# Patient Record
Sex: FEMALE | Race: ASIAN | NOT HISPANIC OR LATINO | Employment: OTHER | URBAN - METROPOLITAN AREA
[De-identification: names, ages, dates, MRNs, and addresses within clinical notes are randomized per-mention and may not be internally consistent; named-entity substitution may affect disease eponyms.]

---

## 2017-02-07 ENCOUNTER — OFFICE VISIT (OUTPATIENT)
Dept: FAMILY MEDICINE | Facility: CLINIC | Age: 62
End: 2017-02-07

## 2017-02-07 VITALS
TEMPERATURE: 98.3 F | SYSTOLIC BLOOD PRESSURE: 146 MMHG | HEART RATE: 70 BPM | DIASTOLIC BLOOD PRESSURE: 82 MMHG | BODY MASS INDEX: 25.9 KG/M2 | HEIGHT: 61 IN | WEIGHT: 137.2 LBS

## 2017-02-07 DIAGNOSIS — J31.0 CHRONIC RHINITIS: Primary | ICD-10-CM

## 2017-02-07 DIAGNOSIS — I10 BENIGN ESSENTIAL HYPERTENSION: ICD-10-CM

## 2017-02-07 RX ORDER — CETIRIZINE HYDROCHLORIDE 10 MG/1
10 TABLET ORAL EVERY EVENING
Qty: 30 TABLET | Refills: 1 | Status: SHIPPED | OUTPATIENT
Start: 2017-02-07 | End: 2017-03-13

## 2017-02-07 RX ORDER — FLUTICASONE PROPIONATE 50 MCG
1-2 SPRAY, SUSPENSION (ML) NASAL DAILY
Qty: 1 BOTTLE | Refills: 11 | Status: SHIPPED | OUTPATIENT
Start: 2017-02-07 | End: 2017-12-07

## 2017-02-07 NOTE — PROGRESS NOTES
"     SUBJECTIVE       Razia Dos Santos is a 61 year old  female with a PMHx significant for:     Patient Active Problem List   Diagnosis     Allergic rhinitis     Pure hypercholesterolemia     Insomnia     Nonspecific reaction to tuberculin skin test without active tuberculosis     Migraine     Abnormal Pap smear of cervix     Osteopenia     Glaucoma     Benign essential hypertension     Hypertension     Here today for nasal symptoms. She has more than two year history of intermittent nasal congestion, which is worse when lying down. Over the past two weeks she has started to have a \"foul odor\" in her nose, similar to cheese gone bad. She has no pain or sinus pressure. No ear pressure or ear pain. No sore throat. No cough. No fevers or sick contacts. Her symptoms do not have a seasonal component.     She takes lisinopril for her HTN. She is concerned the lisinopril may be causing hair loss.     Patient speaks Lithuanian and so an  was used.    PMH, Medications and Allergies were reviewed and updated as needed.    10 Point ROS negative except as listed in HPI        OBJECTIVE     /82 mmHg  Pulse 70  Temp(Src) 98.3  F (36.8  C) (Oral)  Ht 5' 1.02\" (155 cm)  Wt 137 lb 3.2 oz (62.234 kg)  BMI 25.90 kg/m2      Gen:  NAD  HEENT: mucous membranes moist, PERRL, TMs clear bilaterally, nares patent without significant discharge or erythema, oropharynx without petechia or exudate   Neck: supple without lymphadenopathy  CV:  RRR  - no murmurs, rubs, or gallups  Pulm:  CTAB, no wheezes/rales/rhonchi  Extrem: normal strength bilaterally  Psych: Mood and affect appropriate      No results found for this or any previous visit (from the past 24 hour(s)).      ASSESSMENT AND PLAN     Razia was seen today for nasal congestion.    Diagnoses and all orders for this visit:    Chronic rhinitis  Unclear cause, for now we will treat empirically with flonase and zyrtec.  -     fluticasone (FLONASE) 50 MCG/ACT spray; Spray 1-2 " sprays into both nostrils daily  -     cetirizine (ZYRTEC) 10 MG tablet; Take 1 tablet (10 mg) by mouth every evening    Benign essential hypertension  BP elevated today to 146/82 today. This is below the recommended 150/90 for ages greater than 60, however due to her prediabetes tighter BP control is indicated. She is reluctant to increase her dose of lisinopril (currently 5mg) because the believes it is worsening her hair loss.         -  RTC in one month to discuss increase of BP meds      RTC in 1 months for follow up of BP or sooner if develops new or worsening symptoms.    Discussed with MD Amandeep Avendaño, MS4, served as scribe for Chloe Robbins MD      The above medical student acted as a scribe and the encounter documented above was performed completely by me. Documentation recorded accurately reflects the work I have performed today.    Chloe Robbins MD - PGY-3  [facility] Residency    Patient seen and plan of care discussed with preceptor, Dr. Hayes

## 2017-02-07 NOTE — MR AVS SNAPSHOT
"              After Visit Summary   2017    Razia Dos Santos    MRN: [id number]           Patient Information     Date Of Birth          1955        Visit Information        Provider Department      2017 9:00 AM Chloe Robbins MD [facility]        Today's Diagnoses     Chronic rhinitis    -  1     Benign essential hypertension            Follow-ups after your visit        Who to contact     Please call your clinic at [phone] to:    Ask questions about your health    Make or cancel appointments    Discuss your medicines    Learn about your test results    Speak to your doctor   If you have compliments or concerns about an experience at your clinic, or if you wish to file a complaint, please contact [facility] Patient Relations at [phone] or email us at [email or web address]         Additional Information About Your Visit        MyChart Information     Bizmore is an electronic gateway that provides easy, online access to your medical records. With Bizmore, you can request a clinic appointment, read your test results, renew a prescription or communicate with your care team.     To sign up for Quippit visit the website at [email or web address]   You will be asked to enter the access code listed below, as well as some personal information. Please follow the directions to create your username and password.     Your access code is: [email or web address]  Expires: 2017  9:44 AM     Your access code will  in 90 days. If you need help or a new code, please contact your [facility] or call [phone] for assistance.        Care EveryWhere ID     This is your Care EveryWhere ID. This could be used by other organizations to access your [employer] medical records  [id number]        Your Vitals Were     Pulse Temperature Height BMI (Body Mass Index)          70 98.3  F (36.8  C) (Oral) 5' 1.02\" (155 cm) 25.90 kg/m2         Blood " Pressure from Last 3 Encounters:   02/07/17 146/82   09/15/16 142/84   03/29/16 130/83    Weight from Last 3 Encounters:   02/07/17 137 lb 3.2 oz (62.234 kg)   09/15/16 139 lb 6.4 oz (63.231 kg)   03/29/16 139 lb 3.2 oz (63.141 kg)              Today, you had the following     No orders found for display         Today's Medication Changes          These changes are accurate as of: 2/7/17  9:44 AM.  If you have any questions, ask your nurse or doctor.               Start taking these medicines.        Dose/Directions    cetirizine 10 MG tablet   Commonly known as:  zyrTEC   Used for:  Chronic rhinitis   Started by:  Chloe Robbins MD        Dose:  10 mg   Take 1 tablet (10 mg) by mouth every evening   Quantity:  30 tablet   Refills:  1       fluticasone 50 MCG/ACT spray   Commonly known as:  FLONASE   Used for:  Chronic rhinitis   Started by:  Chloe Robbins MD        Dose:  1-2 spray   Spray 1-2 sprays into both nostrils daily   Quantity:  1 Bottle   Refills:  11            Where to get your medicines      These medications were sent to [facility] - [place], [place] - [address], [place] [address]     Phone:  [phone]    - cetirizine 10 MG tablet  - fluticasone 50 MCG/ACT spray             Primary Care Provider Office Phone # Fax #    Chloe Robbins [phone]       [address] [address]        Thank you!     Thank you for choosing [facility]  for your care. Our goal is always to provide you with excellent care. Hearing back from our patients is one way we can continue to improve our services. Please take a few minutes to complete the written survey that you may receive in the mail after your visit with us. Thank you!             Your Updated Medication List - Protect others around you: Learn how to safely use, store and throw away your medicines at www.disposemymeds.org.          This list is accurate as of:  2/7/17  9:44 AM.  Always use your most recent med list.                   Brand Name Dispense Instructions for use    calcium 600-200 MG-UNIT Tabs      Take  by mouth. Take 2 tablet daily       cetirizine 10 MG tablet    zyrTEC    30 tablet    Take 1 tablet (10 mg) by mouth every evening       cholecalciferol 1000 UNIT tablet    vitamin D    100 tablet    Take 1 tablet (1,000 Units) by mouth daily       fluticasone 50 MCG/ACT spray    FLONASE    1 Bottle    Spray 1-2 sprays into both nostrils daily       ibuprofen 200 MG tablet    ADVIL/MOTRIN    120 tablet    Take 1 tablet (200 mg) by mouth every 4 hours as needed for mild pain       lisinopril 5 MG tablet    PRINIVIL/ZESTRIL    90 tablet    Take 1 tablet (5 mg) by mouth daily       MULTIVITAMINS PO      Take  by mouth. Take 1 tablet daily       naproxen 500 MG tablet    NAPROSYN    30 tablet    Take 1 tablet (500 mg) by mouth 2 times daily as needed for moderate pain       OMEGA-3 FISH OIL PO          simvastatin 20 MG tablet    ZOCOR    90 tablet    Take 1 tablet (20 mg) by mouth At Bedtime       triamcinolone 0.1 % cream    KENALOG    80 g    Apply sparingly to affected area 2 times daily as needed to the outside of the vagina.  Use no more than 2 weeks in a row.       vitamin B complex with vitamin C Tabs tablet      Take 1 tablet by mouth daily

## 2017-02-07 NOTE — PROGRESS NOTES
Preceptor attestation:  Patient seen and discussed with the resident. Assessment and plan reviewed with resident and agreed upon.  Supervising physician: Paolo Hayes  [facility]

## 2017-03-13 ENCOUNTER — OFFICE VISIT (OUTPATIENT)
Dept: FAMILY MEDICINE | Facility: CLINIC | Age: 62
End: 2017-03-13

## 2017-03-13 VITALS
HEIGHT: 61 IN | HEART RATE: 81 BPM | BODY MASS INDEX: 26.36 KG/M2 | WEIGHT: 139.6 LBS | SYSTOLIC BLOOD PRESSURE: 130 MMHG | OXYGEN SATURATION: 98 % | DIASTOLIC BLOOD PRESSURE: 85 MMHG | TEMPERATURE: 98.2 F

## 2017-03-13 DIAGNOSIS — R73.03 PRE-DIABETES: Primary | ICD-10-CM

## 2017-03-13 DIAGNOSIS — I10 BENIGN ESSENTIAL HYPERTENSION: ICD-10-CM

## 2017-03-13 DIAGNOSIS — J31.0 CHRONIC RHINITIS: ICD-10-CM

## 2017-03-13 LAB — HBA1C MFR BLD: 5.7 % (ref 4.1–5.7)

## 2017-03-13 RX ORDER — CETIRIZINE HYDROCHLORIDE 10 MG/1
10 TABLET ORAL EVERY EVENING
Qty: 90 TABLET | Refills: 3 | Status: SHIPPED | OUTPATIENT
Start: 2017-03-13 | End: 2017-12-07

## 2017-03-13 NOTE — PROGRESS NOTES
"       SUBJECTIVE       Razia Dos Santos is a 61 year old  female with a PMH significant for:     Patient Active Problem List   Diagnosis     Pure hypercholesterolemia     Insomnia     Nonspecific reaction to tuberculin skin test without active tuberculosis     Migraine     Abnormal Pap smear of cervix     Osteopenia     Glaucoma     Benign essential hypertension     Pre-diabetes     Chronic rhinitis     She presents for routine follow-up of hypertension and pre-diabetes. Reports no problems with medications. Taking Lisinopril and Simvastatin daily. No dizziness, headaches, light-headedness, muscle aches or joint pain. Denies missing doses of medication. Brought in Co-Enzyme Q supplement which she bought and is wondering if she should take this supplement. Discussed that the risks are generally low, but unsure if there is any true benefit. She would like to know for sure before she starts taking them. She is still working every day. She has been walking a little less than previously set goal of 30 minutes per day due to the weather being colder outside.    Reports that rhinitis symptoms have gotten better since last visit. Was taking cetirizine and using flonase most days but had decreased use and sometimes only using Flonase. Rich sinus pressure, rhinorrhea, ear pain, facial pain, sore throat, and fevers. Would like to know if she can decrease use of medications so she does not have to take them every day.     PMH, Medications and Allergies were reviewed and updated as needed.          OBJECTIVE     Vitals:    03/13/17 0806   BP: 130/85   Pulse: 81   Temp: 98.2  F (36.8  C)   TempSrc: Oral   SpO2: 98%   Weight: 139 lb 9.6 oz (63.3 kg)   Height: 5' 1\" (154.9 cm)     Body mass index is 26.38 kg/(m^2).    General: Calm appearing female in no apparent distress.   HEENT; normocephalic, atrauamtic, PERRL, sclera anicteric and not injected. TM flat without injection. No pain with manipulation of tragus or pinna. Oropharynx moist " without erythema or tonsillar exudates  Cardio: RRR, no murmurs, rubs or gallop  Respiratory: Clear breath sounds bilaterally, no wheezes or rhonchi  Abdomen: Soft, non-tender, no masses or organomegaly  Skin: No rashes or bruising    The 10-year ASCVD risk score (Felix TRAV Jr., et al., 2013) is: 5.5%    Values used to calculate the score:      Age: 61 years      Sex: Female      Is Non- : No      Diabetic: No      Tobacco smoker: No      Systolic Blood Pressure: 130 mmHg      Is Prescribed Antihypertensives: Yes      HDL Cholesterol: 50 mg/dL      Total Cholesterol: 179 mg/dL      ASSESSMENT AND PLAN     1. Chronic rhinitis: Symptoms significantly improved. Discussed that she can use medications as needed if she is feeling better or use them seasonally when symptoms are present. Refills are about to  on Cetirizine and so this was refilled today.   - cetirizine (ZYRTEC) 10 MG tablet; Take 1 tablet (10 mg) by mouth every evening  Dispense: 90 tablet; Refill: 3    2. Pre-diabetes: Last A1c was 5.9 in 2016. Discussed that she could recheck in 1 year or today and she prefers to check today.   - Hemoglobin A1c ([facility])    3. Benign essential hypertension: Stable and at goal with Lisinopril 5 mg and is taking a stating. ASCVD risk is 5.5%, does not require aspirin therapy. Continue current Lisinopril dose      Chloe Robbins MD - PGY-3  [facility] Residency    Patient seen and plan of care discussed with preceptor, Dr. Long

## 2017-03-13 NOTE — MR AVS SNAPSHOT
"              After Visit Summary   3/13/2017    Razia Dos Santos    MRN: [id number]           Patient Information     Date Of Birth          1955        Visit Information        Provider Department      3/13/2017 8:00 AM Chloe Robbins MD [facility]        Today's Diagnoses     Pre-diabetes    -  1    Chronic rhinitis        Benign essential hypertension           Follow-ups after your visit        Who to contact     Please call your clinic at [phone] to:    Ask questions about your health    Make or cancel appointments    Discuss your medicines    Learn about your test results    Speak to your doctor   If you have compliments or concerns about an experience at your clinic, or if you wish to file a complaint, please contact [facility] Patient Relations at [phone] or email us at [email or web address]         Additional Information About Your Visit        MyChart Information     Diveboard is an electronic gateway that provides easy, online access to your medical records. With Diveboard, you can request a clinic appointment, read your test results, renew a prescription or communicate with your care team.     To sign up for Brash Entertainmentt visit the website at [email or web address]   You will be asked to enter the access code listed below, as well as some personal information. Please follow the directions to create your username and password.     Your access code is: [email or web address]  Expires: 2017 10:44 AM     Your access code will  in 90 days. If you need help or a new code, please contact your [facility] or call [phone] for assistance.        Care EveryWhere ID     This is your Care EveryWhere ID. This could be used by other organizations to access your [place] medical records  [id number]        Your Vitals Were     Pulse Temperature Height Pulse Oximetry BMI (Body Mass Index)       81 98.2  F (36.8  C) (Oral) 5' 1\" (154.9 cm) " 98% 26.38 kg/m2        Blood Pressure from Last 3 Encounters:   03/13/17 130/85   02/07/17 146/82   09/15/16 142/84    Weight from Last 3 Encounters:   03/13/17 139 lb 9.6 oz (63.3 kg)   02/07/17 137 lb 3.2 oz (62.2 kg)   09/15/16 139 lb 6.4 oz (63.2 kg)              We Performed the Following     Hemoglobin A1c ([facility])          Where to get your medicines      These medications were sent to [facility] - [place], [place] - [address], [place] [address]     Phone:  [phone]     cetirizine 10 MG tablet          Primary Care Provider Office Phone # Fax #    Chloe Robbins [phone]       [address] [address]        Thank you!     Thank you for choosing [facility]  for your care. Our goal is always to provide you with excellent care. Hearing back from our patients is one way we can continue to improve our services. Please take a few minutes to complete the written survey that you may receive in the mail after your visit with us. Thank you!             Your Updated Medication List - Protect others around you: Learn how to safely use, store and throw away your medicines at www.disposemymeds.org.          This list is accurate as of: 3/13/17  8:57 AM.  Always use your most recent med list.                   Brand Name Dispense Instructions for use    calcium 600-200 MG-UNIT Tabs      Take  by mouth. Take 2 tablet daily       cetirizine 10 MG tablet    zyrTEC    90 tablet    Take 1 tablet (10 mg) by mouth every evening       cholecalciferol 1000 UNIT tablet    vitamin D    100 tablet    Take 1 tablet (1,000 Units) by mouth daily       co-enzyme Q-10 30 MG Caps capsule      Take by mouth daily       fluticasone 50 MCG/ACT spray    FLONASE    1 Bottle    Spray 1-2 sprays into both nostrils daily       ibuprofen 200 MG tablet    ADVIL/MOTRIN    120 tablet    Take 1 tablet (200 mg) by mouth every 4 hours as needed for mild pain        lisinopril 5 MG tablet    PRINIVIL/ZESTRIL    90 tablet    Take 1 tablet (5 mg) by mouth daily       MULTIVITAMINS PO      Take  by mouth. Take 1 tablet daily       naproxen 500 MG tablet    NAPROSYN    30 tablet    Take 1 tablet (500 mg) by mouth 2 times daily as needed for moderate pain       OMEGA-3 FISH OIL PO          simvastatin 20 MG tablet    ZOCOR    90 tablet    Take 1 tablet (20 mg) by mouth At Bedtime       triamcinolone 0.1 % cream    KENALOG    80 g    Apply sparingly to affected area 2 times daily as needed to the outside of the vagina.  Use no more than 2 weeks in a row.       vitamin B complex with vitamin C Tabs tablet      Take 1 tablet by mouth daily Reported on 3/13/2017

## 2017-03-13 NOTE — PROGRESS NOTES
"       SUBJECTIVE       Razia Dos Santos is a 61 year old  female with a PMH significant for:     Patient Active Problem List   Diagnosis     Allergic rhinitis     Pure hypercholesterolemia     Insomnia     Nonspecific reaction to tuberculin skin test without active tuberculosis     Migraine     Abnormal Pap smear of cervix     Osteopenia     Glaucoma     Benign essential hypertension     Hypertension     She presents with ***.    PMH, Medications and Allergies were reviewed and updated as needed.          OBJECTIVE     Vitals:    03/13/17 0806   BP: 130/85   Pulse: 81   Temp: 98.2  F (36.8  C)   TempSrc: Oral   SpO2: 98%   Weight: 139 lb 9.6 oz (63.3 kg)   Height: 5' 1\" (154.9 cm)     Body mass index is 26.38 kg/(m^2).    { :1039250::\"Constitutional: Awake, alert, cooperative, no apparent distress, and appears stated age.\",\"Eyes: Lids and lashes normal, pupils equal, round and reactive to light, extra ocular muscles intact, sclera clear, conjunctiva normal.\",\"ENT: Normocephalic, without obvious abnormality, atramatic, sinuses nontender on palpation, external ears without lesions, oral pharynx with moist mucus membranes, tonsils without erythema or exudates, gums normal and good dentition.\",\"Neck: Supple, symmetrical, trachea midline, no adenopathy, thyroid symmetric, not enlarged and no tenderness, skin normal.\",\"Hematologic / Lymphatic: No cervical lymphadenopathy and no supraclavicular lymphadenopathy.\",\"Back: Symmetric, no curvature, spinous processes are non-tender on palpation, paraspinous muscles are non-tender on palpation, no costal vertebral tenderness.\",\"Lungs: No increased work of breathing, good air exchange, clear to auscultation bilaterally, no crackles or wheezing.\",\"Cardiovascular: Regular rate and rhythm, normal S1 and S2, no S3 or S4, and no murmur noted.\",\"Chest / Breast: Breasts symmetrical, skin without lesion(s), no nipple retraction or dimpling, no nipple discharge, no masses palpated, no axillary or " "supraclavicular adenopathy.\",\"Abdomen: No scars, normal bowel sounds, soft, non-distended, non-tender, no masses palpated, no hepatosplenomegally.\",\"Genitourinary: No urethral discharge, normal external genitalia, no hernia.\",\"Musculoskeletal: No redness, warmth, or swelling of the joints.  Full range of motion noted.  Motor strength is 5 out of 5 all extremities bilaterally.  Tone is normal.\",\"Neurologic: Awake, alert, oriented to name, place and time.  Cranial nerves II-XII are grossly intact.  Motor is 5 out of 5 bilaterally.  Cerebellar finger to nose, heel to shin intact.  Sensory is intact.  Babinski down going, Romberg negative, and gait is normal.\",\"Neuropsychiatric: Normal affect, mood, orientation, memory and insight.\",\"Skin: No rashes, erythema, pallor, petechia or purpura.\"}    No results found for this or any previous visit (from the past 24 hour(s)).    The 10-year ASCVD risk score (Felix DC Jr., et al., 2013) is: 5.5%    Values used to calculate the score:      Age: 61 years      Sex: Female      Is Non- : No      Diabetic: No      Tobacco smoker: No      Systolic Blood Pressure: 130 mmHg      Is Prescribed Antihypertensives: Yes      HDL Cholesterol: 50 mg/dL      Total Cholesterol: 179 mg/dL        ASSESSMENT AND PLAN     There are no diagnoses linked to this encounter.      RTC in *** for follow up of *** or sooner if develops new or worsening symptoms.    Chloe Robbins    "

## 2017-03-13 NOTE — NURSING NOTE
name: Alison Mtz  Language: Moroccan  Agency: [facility]  Phone number: [phone]

## 2017-03-13 NOTE — PROGRESS NOTES
Preceptor attestation:  Patient seen and discussed with the resident. Assessment and plan reviewed with resident and agreed upon.  Supervising physician: Darin Long  [facility]

## 2017-03-23 DIAGNOSIS — I10 BENIGN ESSENTIAL HYPERTENSION: ICD-10-CM

## 2017-03-23 RX ORDER — LISINOPRIL 5 MG/1
5 TABLET ORAL DAILY
Qty: 90 TABLET | Refills: 3 | Status: SHIPPED | OUTPATIENT
Start: 2017-03-23 | End: 2017-12-26

## 2017-03-23 RX ORDER — SIMVASTATIN 20 MG
20 TABLET ORAL AT BEDTIME
Qty: 90 TABLET | Refills: 4 | Status: SHIPPED | OUTPATIENT
Start: 2017-03-23 | End: 2018-05-08

## 2017-08-12 ENCOUNTER — HEALTH MAINTENANCE LETTER (OUTPATIENT)
Age: 62
End: 2017-08-12

## 2017-11-21 DIAGNOSIS — E55.9 VITAMIN D DEFICIENCY: ICD-10-CM

## 2017-12-07 ENCOUNTER — RECORDS - HEALTHEAST (OUTPATIENT)
Dept: ADMINISTRATIVE | Facility: OTHER | Age: 62
End: 2017-12-07

## 2017-12-07 ENCOUNTER — OFFICE VISIT (OUTPATIENT)
Dept: FAMILY MEDICINE | Facility: CLINIC | Age: 62
End: 2017-12-07

## 2017-12-07 VITALS
HEIGHT: 61 IN | DIASTOLIC BLOOD PRESSURE: 81 MMHG | SYSTOLIC BLOOD PRESSURE: 146 MMHG | TEMPERATURE: 97.8 F | BODY MASS INDEX: 26.32 KG/M2 | HEART RATE: 62 BPM | WEIGHT: 139.4 LBS

## 2017-12-07 DIAGNOSIS — H02.9 LESION OF LEFT EYELID: Primary | ICD-10-CM

## 2017-12-07 DIAGNOSIS — R73.03 PREDIABETES: ICD-10-CM

## 2017-12-07 DIAGNOSIS — Z12.31 VISIT FOR SCREENING MAMMOGRAM: ICD-10-CM

## 2017-12-07 DIAGNOSIS — M85.80 OSTEOPENIA, UNSPECIFIED LOCATION: ICD-10-CM

## 2017-12-07 DIAGNOSIS — I10 BENIGN ESSENTIAL HYPERTENSION: ICD-10-CM

## 2017-12-07 DIAGNOSIS — Z23 NEED FOR VACCINATION: ICD-10-CM

## 2017-12-07 DIAGNOSIS — Z00.00 ROUTINE GENERAL MEDICAL EXAMINATION AT A HEALTH CARE FACILITY: ICD-10-CM

## 2017-12-07 LAB
BUN SERPL-MCNC: 14.8 MG/DL (ref 7–19)
CALCIUM SERPL-MCNC: 10 MG/DL (ref 8.5–10.1)
CHLORIDE SERPLBLD-SCNC: 108.2 MMOL/L (ref 98–110)
CHOLEST SERPL-MCNC: 212.3 MG/DL (ref 0–200)
CHOLEST/HDLC SERPL: 3.6 {RATIO} (ref 0–5)
CO2 SERPL-SCNC: 28.7 MMOL/L (ref 20–32)
CREAT SERPL-MCNC: 0.8 MG/DL (ref 0.5–1)
GFR SERPL CREATININE-BSD FRML MDRD: 77 ML/MIN/1.7 M2
GLUCOSE SERPL-MCNC: 104.7 MG'DL (ref 70–99)
HBA1C MFR BLD: 6 % (ref 4.1–5.7)
HDLC SERPL-MCNC: 59.5 MG/DL
LDLC SERPL CALC-MCNC: 125 MG/DL (ref 0–129)
POTASSIUM SERPL-SCNC: 4.4 MMOL/DL (ref 3.2–4.6)
SODIUM SERPL-SCNC: 144.3 MMOL/L (ref 132–142)
TRIGL SERPL-MCNC: 140.1 MG/DL (ref 0–150)
VLDL CHOLESTEROL: 28 MG/DL (ref 7–32)

## 2017-12-07 NOTE — PATIENT INSTRUCTIONS
Labs today  Schedule mammogram and DEXA (bone scan) and referral for mole removal  Flu shot  Come back in 6 months for blood pressure check  Dr. Mae Perez      Preventive Health Recommendations  Female Ages 50 - 64    Yearly exam: See your health care provider every year in order to  o Review health changes.   o Discuss preventive care.    o Review your medicines if your doctor has prescribed any.      Get a Pap test every three years (unless you have an abnormal result and your provider advises testing more often).    If you get Pap tests with HPV test, you only need to test every 5 years, unless you have an abnormal result.     You do not need a Pap test if your uterus was removed (hysterectomy) and you have not had cancer.    You should be tested each year for STDs (sexually transmitted diseases) if you're at risk.     Have a mammogram every 1 to 2 years.    Have a colonoscopy at age 50, or have a yearly FIT test (stool test). These exams screen for colon cancer.      Have a cholesterol test every 5 years, or more often if advised.    Have a diabetes test (fasting glucose) every three years. If you are at risk for diabetes, you should have this test more often.     If you are at risk for osteoporosis (brittle bone disease), think about having a bone density scan (DEXA).    Shots: Get a flu shot each year. Get a tetanus shot every 10 years.    Nutrition:     Eat at least 5 servings of fruits and vegetables each day.    Eat whole-grain bread, whole-wheat pasta and brown rice instead of white grains and rice.    Talk to your provider about Calcium and Vitamin D.     Lifestyle    Exercise at least 150 minutes a week (30 minutes a day, 5 days a week). This will help you control your weight and prevent disease.    Limit alcohol to one drink per day.    No smoking.     Wear sunscreen to prevent skin cancer.     See your dentist every six months for an exam and cleaning.    See your eye doctor every 1 to 2  years.       [facility]  [address] [place] [address]  Hours: Open today   8:30AM-5:30PM  Phone: [phone]    Appointment  Date:1/23/17  Time: 1:45pm   Fredis Azevedo MD    Bring photo ID and insurance card.           [facility]  Radiology Department  [address] [place] [address]  [phone]    Appointment for mammogram   Date:12/22/2017  Time:2:00pm    The day of your mammogram please refrain from using lotions, powders, or perfumes in the area as this could interfere with the imaging.         [facility]  [address]  [place] [address]  [phone]    Appointment for Dexa scan  Date:2/7/17  Time:8:30am arrival     Please contact the above clinic if you need to cancel or reschedule. Feel free to contact me with any questions. Thanks!    Joselyn  Referral Coordinator  [phone]          No vitamin pills, calcium supplements or antacids such as tums, rolaids etc.. 2 days prior to appointment and the day of the appointment.

## 2017-12-07 NOTE — PROGRESS NOTES
Female Physical Note    Concerns today:  Throat problems - within the last year, once every three months, when she swallows she chokes. Happens with water and solid foods, although thinks dry solids are likely worse. No problems with dry mouth, does have dry eyes. Last happened about 3 weeks ago. Happens once per day when it happens - is not a problem for the rest of the day. No pain with swallowing or otherwise trouble swallowing. No voice changes. No problems breathing.      Also notes an occasional sharp pain at the bottom of her neck. No trigger, unrelated to choking. Does not appear to be related to activity. Position doesn't matter. Lasts up to 30 seconds. Last happened about a year ago. Non-tender. Thinks it happens about 3x/year. No rash.Doesn't radiate.     When she sleeps, notes urge to have bowel movement, but unable to go despite 15 minutes of pushing. Has one bowel movement per day, soft without pushing, no blood in stool.     A [employer]  was used for  this visit.     ROS:  CONSTITUTIONAL: no fatigue, no unexpected change in weight  SKIN: no worrisome rashes, +dry skin. +mole on inner L eye, getting bigger. Eye doctor thought she should have it removed. Has been there approx 30 years, but has been getting bigger over the last year. Doesn't bother vision. Doesn't bleed or itch.   BREAST: No lumps or changes.   EYES: no acute vision problems or changes  ENT: no ear problems, no mouth problems,   RESP: no significant cough, no shortness of breath  CV: no chest pain, no palpitations, no new or worsening peripheral edema  GI: no nausea, no vomiting, no constipation, no diarrhea      Menarche:* No LMP recorded. Patient is postmenopausal.  Last Pap Smear Date: 2015 normal  Abnormal Pap History: See problem list - now on routine schedule    Patient Active Problem List   Diagnosis     Pure hypercholesterolemia     Insomnia     Nonspecific reaction to tuberculin skin test without active  tuberculosis     Migraine     Abnormal Pap smear of cervix     Osteopenia     Glaucoma     Benign essential hypertension     Pre-diabetes     Chronic rhinitis       Current Outpatient Prescriptions   Medication Sig Dispense Refill     cholecalciferol (VITAMIN D3) 1000 UNIT tablet Take 1 tablet (1,000 Units) by mouth daily 100 tablet 3     simvastatin (ZOCOR) 20 MG tablet Take 1 tablet (20 mg) by mouth At Bedtime 90 tablet 4     lisinopril (PRINIVIL/ZESTRIL) 5 MG tablet Take 1 tablet (5 mg) by mouth daily 90 tablet 3     Omega-3 Fatty Acids (OMEGA-3 FISH OIL PO)        calcium 600-200 MG-UNIT TABS Take  by mouth. Take 2 tablet daily       Multiple Vitamin (MULTIVITAMINS PO) Take  by mouth. Take 1 tablet daily       co-enzyme Q-10 30 MG CAPS capsule Take by mouth daily  0     vitamin  B complex with vitamin C (STRESS TAB) TABS Take 1 tablet by mouth daily Reported on 3/13/2017         Past Medical History:   Diagnosis Date     HPV (low risk) 3/14/2013    3/2013:  Normal pap, positive for low risk HPV.   H/O ASCUS in 2005, +HPV of undetermined risk in 2012 No history of high grade lesion     Hypertension         Family History        Negative family history of: DIABETES, Coronary Artery Disease, Hypertension, Hyperlipidemia, Breast Cancer, Cancer - colorectal, Ovarian Cancer, Prostate Cancer, Other Cancer, Mental Illness, CEREBROVASCULAR DISEASE, Anesthesia Reaction, Asthma, OSTEOPOROSIS, Known Genetic Syndrome, Obesity, Unknown/Adopted          Problem List Medication List and Allergy List were reviewed.    Patient is an established patient of this clinic..    Social History   Substance Use Topics     Smoking status: Never Smoker     Smokeless tobacco: Never Used     Alcohol use No       Children ? no    Has anyone hurt you physically, for example by pushing, hitting, slapping or kicking you or forcing you to have sex? Denies  Do you feel threatened or controlled by a partner, ex-partner or anyone in your life?  "Denies    RISK BEHAVIORS AND HEALTHY HABITS:  Tobacco Use/Smoking: None  Illicit Drug Use: None  Do you use alcohol? 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   Dental Care: Yes   Calcium 1500 mg/d:  Yes  Seat Belt Use: Yes     Cholesterol Level (>44 yo or at risk):  Recommended and patient accepted testing., Pap/HPV cotest every 5 years for women 30-65   Testing not indicated  and Dexa Scan (women>65 yrs or risk = that of a 66yo woman):  Recommended and patient accepted testing.  Breast CA Screening (>39 yo or 10 y before 1st degree relative diagnosis): Recommended and patient accepted testing.      Immunization History   Administered Date(s) Administered     HEPA 04/24/2006, 11/13/2006     HepB 04/24/2006, 11/13/2006, 12/13/2011     Influenza (IIV3) PF 12/13/2011     Influenza Vaccine, 3 YRS +, IM (QUADRIVALENT W/PRESERVATIVES) 10/15/2015     TD (ADULT, 7+) 11/09/1999     Tdap (Adacel,Boostrix) 12/13/2011     Typhoid IM 04/24/2006    Reviewed Immunization Record Today    EXAMINATION:   /81  Pulse 62  Temp 97.8  F (36.6  C) (Oral)  Ht 5' 1\" (154.9 cm)  Wt 139 lb 6.4 oz (63.2 kg)  BMI 26.34 kg/m2  GENERAL: healthy, alert and no distress  EYES: Eyes grossly normal to inspection, extraocular movements - intact, and PERRL Approx 4 mm pearly papule with telangiectasia on medial upper L eyelid.   HENT: ear canals- normal; TMs- normal; Nose- normal; Mouth- no ulcers, no lesions Swallows water without tenderness or choking  NECK: no tenderness, no adenopathy, no asymmetry, no masses, no stiffness;   RESP: lungs clear to auscultation - no rales, no rhonchi, no wheezes  BREAST: no masses, no tenderness, no nipple discharge, no palpable axillary masses or adenopathy  CV: regular rates and rhythm, normal S1 S2, no S3 or S4 and no murmur, no click or rub -  ABDOMEN: soft, no tenderness, no  hepatosplenomegaly, no masses, " normal bowel sounds  MS: extremities- no gross deformities noted, no edema  SKIN: no suspicious lesions, no rashes  NEURO: strength and tone- normal, sensory exam- grossly normal, mentation- intact, speech- normal, reflexes- symmetric  BACK: no CVA tenderness, no paralumbar tenderness  - female: Deferred  PSYCH: Alert and oriented times 3; speech- coherent , normal rate and volume; able to articulate logical thoughts, able to abstract reason, no tangential thoughts, no hallucinations or delusions, affect- normal  LYMPHATICS: ant. cervical- normal, post. cervical- normal, axillary- normal, supraclavicular- normal,    ASSESSMENT/PLAN:  1. Routine general medical examination at a health care facility  2. Visit for screening mammogram  Generally healthy 62 year old female with PMH of pre-diabetes and osteopenia. Not due for colorectal cancer screening. Not of age for pneumococcal vaccines. Discussed additional concerns including choking on water (no weight loss, odynophagia/dysphagia otherwise or globus sensation or risk factors such as betel nut or tobacco use), will defer further work up at this time due to rare intermittent nature of problem - could consider laryngoscopy in future if continues to be a problem. Chest pain intermittent but not triggered by exertion - discussed cardiac warning signs and indications to seek further treatment. Bowel movements otherwise reassuring and regular. Last mammogram 2014, no family history of breast cancer. She is on regular Pap screen schedule now despite abnormal in the past - will likely need one more normal screen before discussing cessation of screening. She will defer this until next year (last one 2015 and normal - no cotesting at that time).  - MA SCREENING DIGITAL BILAT; Future    3. Lesion of left eyelid  Growing over past year, will refer for removal  - OPHTHALMOLOGY ADULT REFERRAL    4. Prediabetes  Last A1c of 5.7, has been trying diet and exercise. On simvastatin  without side effects, will recheck lipids today  - Hemoglobin A1c ([facility])  - Lipid Panel ([place])    5. Osteopenia, unspecified location  Last DEXA shows osteopenia in 2008, will recheck as 5 years out. Continue vitamin D.  - Dexa hip/pelvis/spine*; Future    6. Benign essential hypertension  Elevated BP today, did not take BP med before coming in. Will check BMP as on lisinopril and have her come back in 6 months for further discussion.   - Basic Metabolic Panel ([place])    7. Need for vaccination  Flu shot today  - ADMIN VACCINE, INITIAL  - FLU VAC QUADRIVLENT SPLIT VIRUS IM 0.5ml dosage      Mae Perez MD, PGY-2  [facility] Medicine Residency  Precepted with Dr. Wes Fitch

## 2017-12-07 NOTE — NURSING NOTE
"Injectable Influenza Immunization Documentation    1.  Has the patient received the information for the injectable influenza vaccine? YES     2. Is the patient 6 months of age or older? YES     3. Does the patient have any of the following contraindications?         Severe allergy to eggs? No     Severe allergic reaction to previous influenza vaccines? No   Severe allergy to latex? No       History of Guillain-Headland syndrome? No     Currently have a temperature greater than 100.4F? No        4.  Severely egg allergic patients should have flu vaccine eligibility assessed by an MD, RN, or pharmacist, and those who received flu vaccine should be observed for 15 min by an MD, RN, Pharmacist, Medical Technician, or member of clinic staff.\": YES    5. Latex-allergic patients should be given latex-free influenza vaccine Yes. Please reference the Vaccine latex table to determine if your clinic s product is latex-containing.       Vaccination given by MAICO Hinton          "

## 2017-12-07 NOTE — LETTER
December 11, 2017      Razia Dos Santos  [address] [place] [id number]        Dear Razia,    Please see below for your test results.   Your labs look pretty good from our last visit. You are still in the pre-diabetes range. I think we could do more to prevent you from having diabetes in the future. If you are interested in starting a program that will help you with lifestyle changes or a possible medication, please make an appointment to see us to discuss this further. Your cholesterol looks OK, so we can continue that same medication you've been taking (simvastatin).   10 year ASCVD risk 7.1%. On moderate intensity statin. Will continue current course.     Resulted Orders   Hemoglobin A1c ([facility])   Result Value Ref Range    Hemoglobin A1C 6.0 (H) 4.1 - 5.7 %   Basic Metabolic Panel ([place])   Result Value Ref Range    Urea Nitrogen 14.8 7.0 - 19.0 mg/dL    Calcium 10.0 8.5 - 10.1 mg/dL    Chloride 108.2 98.0 - 110.0 mmol/L    Carbon Dioxide 28.7 20.0 - 32.0 mmol/L    Creatinine 0.8 0.5 - 1.0 mg/dL    Glucose 104.7 (H) 70.0 - 99.0 mg'dL    Potassium 4.4 3.2 - 4.6 mmol/dL    Sodium 144.3 (H) 132.0 - 142.0 mmol/L    GFR Estimate 77.0 >60.0 mL/min/1.7 m2    GFR Estimate If Black >90 >60.0 mL/min/1.7 m2   Lipid Panel ([place])   Result Value Ref Range    Cholesterol 212.3 (H) 0.0 - 200.0 mg/dL    Cholesterol/HDL Ratio 3.6 0.0 - 5.0    HDL Cholesterol 59.5 >40.0 mg/dL    LDL Cholesterol Calculated 125 0 - 129 mg/dL    Triglycerides 140.1 0.0 - 150.0 mg/dL    VLDL Cholesterol 28.0 7.0 - 32.0 mg/dL       If you have any questions, please call the clinic to make an appointment.    Sincerely,    Mae Perez MD

## 2017-12-07 NOTE — MR AVS SNAPSHOT
After Visit Summary   12/7/2017    Razia Dos Santos    MRN: [id number]           Patient Information     Date Of Birth          1955        Visit Information        Provider Department      12/7/2017 8:00 AM Mae Perez MD [facility]        Today's Diagnoses     Routine general medical examination at a health care facility        Visit for screening mammogram        Screening for cervical cancer          Care Instructions    Labs today  Schedule mammogram and DEXA (bone scan) and referral for mole removal  Flu shot  Come back in 6 months for blood pressure check  Dr. Mae Perez      Preventive Health Recommendations  Female Ages 50 - 64    Yearly exam: See your health care provider every year in order to  o Review health changes.   o Discuss preventive care.    o Review your medicines if your doctor has prescribed any.      Get a Pap test every three years (unless you have an abnormal result and your provider advises testing more often).    If you get Pap tests with HPV test, you only need to test every 5 years, unless you have an abnormal result.     You do not need a Pap test if your uterus was removed (hysterectomy) and you have not had cancer.    You should be tested each year for STDs (sexually transmitted diseases) if you're at risk.     Have a mammogram every 1 to 2 years.    Have a colonoscopy at age 50, or have a yearly FIT test (stool test). These exams screen for colon cancer.      Have a cholesterol test every 5 years, or more often if advised.    Have a diabetes test (fasting glucose) every three years. If you are at risk for diabetes, you should have this test more often.     If you are at risk for osteoporosis (brittle bone disease), think about having a bone density scan (DEXA).    Shots: Get a flu shot each year. Get a tetanus shot every 10 years.    Nutrition:     Eat at least 5 servings of fruits and vegetables each day.    Eat whole-grain bread, whole-wheat pasta and  brown rice instead of white grains and rice.    Talk to your provider about Calcium and Vitamin D.     Lifestyle    Exercise at least 150 minutes a week (30 minutes a day, 5 days a week). This will help you control your weight and prevent disease.    Limit alcohol to one drink per day.    No smoking.     Wear sunscreen to prevent skin cancer.     See your dentist every six months for an exam and cleaning.    See your eye doctor every 1 to 2 years.            Follow-ups after your visit        Future tests that were ordered for you today     Open Future Orders        Priority Expected Expires Ordered    MA SCREENING DIGITAL BILAT Routine  2018            Who to contact     Please call your clinic at [phone] to:    Ask questions about your health    Make or cancel appointments    Discuss your medicines    Learn about your test results    Speak to your doctor   If you have compliments or concerns about an experience at your clinic, or if you wish to file a complaint, please contact [facility] Patient Relations at [phone] or email us at [email or web address]         Additional Information About Your Visit        [employer] Information     [employer] is an electronic gateway that provides easy, online access to your medical records. With [employer], you can request a clinic appointment, read your test results, renew a prescription or communicate with your care team.     To sign up for [employer] visit the website at [email or web address]   You will be asked to enter the access code listed below, as well as some personal information. Please follow the directions to create your username and password.     Your access code is: [email or web address]  Expires: 3/7/2018  9:06 AM     Your access code will  in 90 days. If you need help or a new code, please contact your [facility] or call [phone] for assistance.        Care EveryWhere ID      "This is your Care EveryWhere ID. This could be used by other organizations to access your [place] medical records  [id number]        Your Vitals Were     Pulse Temperature Height BMI (Body Mass Index)          62 97.8  F (36.6  C) (Oral) 5' 1\" (154.9 cm) 26.34 kg/m2         Blood Pressure from Last 3 Encounters:   12/07/17 146/81   03/13/17 130/85   02/07/17 146/82    Weight from Last 3 Encounters:   12/07/17 139 lb 6.4 oz (63.2 kg)   03/13/17 139 lb 9.6 oz (63.3 kg)   02/07/17 137 lb 3.2 oz (62.2 kg)                 Today's Medication Changes          These changes are accurate as of: 12/7/17  9:06 AM.  If you have any questions, ask your nurse or doctor.               Stop taking these medicines if you haven't already. Please contact your care team if you have questions.     cetirizine 10 MG tablet   Commonly known as:  zyrTEC   Stopped by:  Mae Perez MD           fluticasone 50 MCG/ACT spray   Commonly known as:  FLONASE   Stopped by:  Mae Perez MD           ibuprofen 200 MG tablet   Commonly known as:  ADVIL/MOTRIN   Stopped by:  Mae Perez MD           naproxen 500 MG tablet   Commonly known as:  NAPROSYN   Stopped by:  Mae Perez MD           triamcinolone 0.1 % cream   Commonly known as:  KENALOG   Stopped by:  Mae Perez MD                    Primary Care Provider Office Phone # Fax #    Mae Perez [phone]       [facility] [address] [place] [address]        Equal Access to Services     DONALD ANGEL : Hadteja Queen, figueroa jiang, qaosvaldota ahmet rascon. So [facility] [phone].    ATENCIÓN: Si habla español, tiene a batista disposición servicios gratuitos de asistencia lingüística. Llame al [phone].    We comply with applicable federal civil rights laws and [place] laws. We do not discriminate on the basis of race, color, national origin, " age, disability, sex, sexual orientation, or gender identity.            Thank you!     Thank you for choosing [facility]  for your care. Our goal is always to provide you with excellent care. Hearing back from our patients is one way we can continue to improve our services. Please take a few minutes to complete the written survey that you may receive in the mail after your visit with us. Thank you!             Your Updated Medication List - Protect others around you: Learn how to safely use, store and throw away your medicines at www.disposemymeds.org.          This list is accurate as of: 12/7/17  9:06 AM.  Always use your most recent med list.                   Brand Name Dispense Instructions for use Diagnosis    calcium 600-200 MG-UNIT Tabs      Take  by mouth. Take 2 tablet daily        cholecalciferol 1000 UNIT tablet    vitamin D3    100 tablet    Take 1 tablet (1,000 Units) by mouth daily    Vitamin D deficiency       co-enzyme Q-10 30 MG Caps capsule      Take by mouth daily        lisinopril 5 MG tablet    PRINIVIL/ZESTRIL    90 tablet    Take 1 tablet (5 mg) by mouth daily    Benign essential hypertension       MULTIVITAMINS PO      Take  by mouth. Take 1 tablet daily        OMEGA-3 FISH OIL PO           simvastatin 20 MG tablet    ZOCOR    90 tablet    Take 1 tablet (20 mg) by mouth At Bedtime    Benign essential hypertension       vitamin B complex with vitamin C Tabs tablet      Take 1 tablet by mouth daily Reported on 3/13/2017

## 2017-12-07 NOTE — PROGRESS NOTES
Preceptor attestation:  Patient seen and discussed with the resident. Assessment and plan reviewed with resident and agreed upon.  Supervising physician: Wes Linda  [facility]

## 2017-12-16 ENCOUNTER — HEALTH MAINTENANCE LETTER (OUTPATIENT)
Age: 62
End: 2017-12-16

## 2017-12-26 ENCOUNTER — OFFICE VISIT (OUTPATIENT)
Dept: FAMILY MEDICINE | Facility: CLINIC | Age: 62
End: 2017-12-26
Payer: COMMERCIAL

## 2017-12-26 ENCOUNTER — RECORDS - HEALTHEAST (OUTPATIENT)
Dept: ADMINISTRATIVE | Facility: OTHER | Age: 62
End: 2017-12-26

## 2017-12-26 VITALS — SYSTOLIC BLOOD PRESSURE: 127 MMHG | DIASTOLIC BLOOD PRESSURE: 84 MMHG | HEART RATE: 69 BPM | TEMPERATURE: 98.4 F

## 2017-12-26 DIAGNOSIS — I10 BENIGN ESSENTIAL HYPERTENSION: ICD-10-CM

## 2017-12-26 DIAGNOSIS — R73.03 PRE-DIABETES: Primary | ICD-10-CM

## 2017-12-26 RX ORDER — MULTIVITAMIN
1 CAPSULE ORAL DAILY
Qty: 90 CAPSULE | Refills: 0 | COMMUNITY
Start: 2017-12-26 | End: 2023-06-09

## 2017-12-26 RX ORDER — LISINOPRIL 5 MG/1
5 TABLET ORAL DAILY
Qty: 90 TABLET | Refills: 3 | Status: SHIPPED | OUTPATIENT
Start: 2017-12-26 | End: 2018-11-09

## 2017-12-26 NOTE — PROGRESS NOTES
Preceptor attestation:  Patient seen and discussed with the resident. Assessment and plan reviewed with resident and agreed upon.  Supervising physician: Pa Pedro  [facility]

## 2017-12-26 NOTE — MR AVS SNAPSHOT
After Visit Summary   2017    Razia Dos Santos    MRN: [id number]           Patient Information     Date Of Birth          1955        Visit Information        Provider Department      2017 8:00 AM Mae Perez MD [facility]        Today's Diagnoses     Pre-diabetes    -  1      Care Instructions    1) less rice, potatoes, bread  2) more fruits and vegetables  3) Activity for 30 minutes most days    Follow up in 3-6 months.  Dr. Mae Perez           Follow-ups after your visit        Additional Services     DIABETES EDUCATOR REFERRAL       Patient is in room number: 14    Diagnosis/Reason for Referral: Pre-diabetes - diet and exercise education     needed: Yes  Language: Austrian    May leave message on voicemail: No    Referral should be tracked? No                  Who to contact     Please call your clinic at [phone] to:    Ask questions about your health    Make or cancel appointments    Discuss your medicines    Learn about your test results    Speak to your doctor   If you have compliments or concerns about an experience at your clinic, or if you wish to file a complaint, please contact [facility] Patient Relations at [phone] or email us at [email or web address]         Additional Information About Your Visit        MyChart Information     ZIRXhart is an electronic gateway that provides easy, online access to your medical records. With PureForge, you can request a clinic appointment, read your test results, renew a prescription or communicate with your care team.     To sign up for Valeritast visit the website at [email or web address]   You will be asked to enter the access code listed below, as well as some personal information. Please follow the directions to create your username and password.     Your access code is: [email or web address]  Expires: 3/7/2018  9:06 AM     Your access code will  in 90 days. If you  need help or a new code, please contact your [facility] or call [phone] for assistance.        Care EveryWhere ID     This is your Care EveryWhere ID. This could be used by other organizations to access your [place] medical records  [id number]        Your Vitals Were     Pulse Temperature                69 98.4  F (36.9  C) (Oral)           Blood Pressure from Last 3 Encounters:   12/26/17 127/84   12/07/17 146/81   03/13/17 130/85    Weight from Last 3 Encounters:   12/07/17 139 lb 6.4 oz (63.2 kg)   03/13/17 139 lb 9.6 oz (63.3 kg)   02/07/17 137 lb 3.2 oz (62.2 kg)              We Performed the Following     DIABETES EDUCATOR REFERRAL          Today's Medication Changes          These changes are accurate as of: 12/26/17  8:34 AM.  If you have any questions, ask your nurse or doctor.               These medicines have changed or have updated prescriptions.        Dose/Directions    MULTIvitamin  S Caps   This may have changed:    - how much to take  - when to take this   Used for:  Pre-diabetes   Changed by:  Mae Perez MD        Dose:  1 capsule   Take 1 capsule by mouth daily Take 1 tablet daily   Quantity:  90 capsule   Refills:  0         Stop taking these medicines if you haven't already. Please contact your care team if you have questions.     co-enzyme Q-10 30 MG Caps capsule   Stopped by:  Mae Perez MD           OMEGA-3 FISH OIL PO   Stopped by:  Mae Perez MD           vitamin B complex with vitamin C Tabs tablet   Stopped by:  Mae Perez MD                    Primary Care Provider Office Phone # Fax #    Mae Perez [phone]       [facility] [address] [place] [address]        Equal Access to Services     LORI ANGEL : Shelly Queen, figueroa jiang, ahmet campos. So [facility] [phone].    ATENCIÓN: Si  daxa silverio, tiene a batista disposición servicios gratuitos de asistencia lingüística. Jovana berkowitz [phone].    We comply with applicable federal civil rights laws and [place] laws. We do not discriminate on the basis of race, color, national origin, age, disability, sex, sexual orientation, or gender identity.            Thank you!     Thank you for choosing [facility]  for your care. Our goal is always to provide you with excellent care. Hearing back from our patients is one way we can continue to improve our services. Please take a few minutes to complete the written survey that you may receive in the mail after your visit with us. Thank you!             Your Updated Medication List - Protect others around you: Learn how to safely use, store and throw away your medicines at www.disposemymeds.org.          This list is accurate as of: 12/26/17  8:34 AM.  Always use your most recent med list.                   Brand Name Dispense Instructions for use Diagnosis    calcium 600-200 MG-UNIT Tabs      Take  by mouth. Take 2 tablet daily        cholecalciferol 1000 UNIT tablet    vitamin D3    100 tablet    Take 1 tablet (1,000 Units) by mouth daily    Vitamin D deficiency       lisinopril 5 MG tablet    PRINIVIL/ZESTRIL    90 tablet    Take 1 tablet (5 mg) by mouth daily    Benign essential hypertension       MULTIvitamin  S Caps     90 capsule    Take 1 capsule by mouth daily Take 1 tablet daily    Pre-diabetes       simvastatin 20 MG tablet    ZOCOR    90 tablet    Take 1 tablet (20 mg) by mouth At Bedtime    Benign essential hypertension

## 2017-12-26 NOTE — PATIENT INSTRUCTIONS
1) less rice, potatoes, bread  2) more fruits and vegetables  3) Activity for 30 minutes most days    Follow up in 3-6 months.  Dr. Mae Peerz       Referral has been sent to [facility].  They will call patient to schedule.   Joselyn  12/29/17

## 2017-12-26 NOTE — PROGRESS NOTES
SUBJECTIVE     Chief Complaint   Patient presents with     Results     Pt is here to discuss her glucose tests.      Medication Reconciliation     Complete.       Subjective: Razia Dos Santos is a 62 year old female with pre-diabetes and HTN here for lab follow-up.    She had bloodtests on 12/7 and noted her glucose in her blood was quite high.   A1c was 6.0, Fasting glucose 104, and lipids notable for Tchol 213, HDL 60 and .   No polyphagia, polydipsia, or increased frequency urination.  Generally walks around at work and eats about 1/3 plate of rice at each meal      ROS:  General: No weight loss  GI: No nausea, vomiting, diarrhea, or constipation.  MSK: No muscle or joint aches      PMH/PSH, FamHx, Meds, Allergies reviewed and updated as needed.    Social History     Social History     Marital status:      Spouse name: N/A     Number of children: N/A     Years of education: N/A     Social History Main Topics     Smoking status: Never Smoker     Smokeless tobacco: Never Used     Alcohol use No     Drug use: No     Sexual activity: Not Asked     Other Topics Concern     None     Social History Narrative           OBJECTIVE     /84 (BP Location: Left arm, Patient Position: Sitting, Cuff Size: Adult Large)  Pulse 69  Temp 98.4  F (36.9  C) (Oral)  There is no height or weight on file to calculate BMI.    Physical exam:  Gen: No acute distress  Psych: Mood and affect appropriate, mentation appears normal.    No results found for this or any previous visit (from the past 24 hour(s)).      ASSESSMENT AND PLAN     Razia Dos Santos is a 62 year old female here for lab checkup    1. Pre-diabetes  A1C and fasting glucose consistent with pre-diabetes. Has been diagnosed with this in the past, labs are stable. Discussed diet and exercise recommendations and will have her make a nurse visit appt in the future for further diet/exercise help. Set some basic goals today, will have her follow-up for repeat labs in 3-6  months (A1c and glucose already ordered) to see how she is progressing on these goals. She is already on a moderate intensity statin. As most recent ascvd risk 10 year at 7.1%, will not start aspirin at this time.   - DIABETES EDUCATOR REFERRAL  - Multiple Vitamin (MULTIVITAMIN  S) CAPS; Take 1 capsule by mouth daily Take 1 tablet daily  Dispense: 90 capsule; Refill: 0  - Hemoglobin A1c ([facility]); Future  - Glucose ([place]); Future    2. Health Care Maintenance  Has plans for mammogram, optho, and dexa in near future.    RTC for nurse visit as early as convenient and in 3-6 months for repeat lab work      Mae Perez MD, PGY-2  I precepted today with Pa Pedro MD.

## 2017-12-29 ENCOUNTER — RECORDS - HEALTHEAST (OUTPATIENT)
Dept: ADMINISTRATIVE | Facility: OTHER | Age: 62
End: 2017-12-29

## 2018-01-03 ENCOUNTER — DOCUMENTATION ONLY (OUTPATIENT)
Dept: FAMILY MEDICINE | Facility: CLINIC | Age: 63
End: 2018-01-03

## 2018-01-03 ENCOUNTER — COMMUNICATION - HEALTHEAST (OUTPATIENT)
Dept: ADMINISTRATIVE | Facility: CLINIC | Age: 63
End: 2018-01-03

## 2018-01-03 NOTE — PROGRESS NOTES
I meant that referral for in house diabetes ed so she could make a nurse visit for further counseling if desired - sorry for the confusion! No out of clinic referral necessary.    Mae Perez MD, PGY-2  [facility]  Pager: [phone]

## 2018-01-03 NOTE — PROGRESS NOTES
Received a call from  Endocrinology and Diabetes, unfortunately insurance will not cover for doing pre-diabetic education.   They do have a call they offer at [facility] where they do an information session. It is $35 out of pocket for the patient and she would need to bring her own  (because they are not billing through insurance, insurance will not pay the cost for this either).     Please advise if you feel a different plan would be better for her. I was thinking possibly lifestyle group, however, I do not believe their focus is on restrictions for specialty diets.

## 2018-01-04 NOTE — PATIENT INSTRUCTIONS
DIABETES EDUCATION IN HOUSE:  Left message via [employer] language line for patient to call referrals to schedule in house diabetes education with nurse.  Kaylah Martel  1/4/18

## 2018-01-08 NOTE — PROGRESS NOTES
JED for pt to schedule a nurse only visit at [facility] to discuss diet and physical activity. Used Botswanan  through [employer].     /AYLIN Lema

## 2018-01-09 NOTE — PROGRESS NOTES
Received a voicemail from patient with  (via Madia). Patient would not like to receive this service for diabetic education and would like us to discontinue contacting her in regards to it.

## 2018-01-10 ENCOUNTER — TRANSFERRED RECORDS (OUTPATIENT)
Dept: HEALTH INFORMATION MANAGEMENT | Facility: CLINIC | Age: 63
End: 2018-01-10

## 2018-01-10 ENCOUNTER — HOSPITAL ENCOUNTER (OUTPATIENT)
Dept: MAMMOGRAPHY | Facility: HOSPITAL | Age: 63
Discharge: HOME | End: 2018-01-10

## 2018-01-10 DIAGNOSIS — Z12.31 VISIT FOR SCREENING MAMMOGRAM: ICD-10-CM

## 2018-02-07 ENCOUNTER — TRANSFERRED RECORDS (OUTPATIENT)
Dept: HEALTH INFORMATION MANAGEMENT | Facility: CLINIC | Age: 63
End: 2018-02-07

## 2018-02-08 ENCOUNTER — RECORDS - HEALTHEAST (OUTPATIENT)
Dept: ADMINISTRATIVE | Facility: OTHER | Age: 63
End: 2018-02-08

## 2018-02-21 DIAGNOSIS — M85.80 OSTEOPENIA, UNSPECIFIED LOCATION: ICD-10-CM

## 2018-03-28 PROBLEM — Z12.39 BREAST CANCER SCREENING: Status: ACTIVE | Noted: 2018-03-28

## 2018-05-08 DIAGNOSIS — I10 BENIGN ESSENTIAL HYPERTENSION: ICD-10-CM

## 2018-05-10 RX ORDER — SIMVASTATIN 20 MG
20 TABLET ORAL AT BEDTIME
Qty: 90 TABLET | Refills: 4 | Status: SHIPPED | OUTPATIENT
Start: 2018-05-10 | End: 2019-01-22

## 2018-05-24 ENCOUNTER — OFFICE VISIT (OUTPATIENT)
Dept: FAMILY MEDICINE | Facility: CLINIC | Age: 63
End: 2018-05-24
Payer: COMMERCIAL

## 2018-05-24 VITALS
WEIGHT: 138.4 LBS | HEIGHT: 61 IN | OXYGEN SATURATION: 97 % | RESPIRATION RATE: 12 BRPM | BODY MASS INDEX: 26.13 KG/M2 | SYSTOLIC BLOOD PRESSURE: 131 MMHG | TEMPERATURE: 97.5 F | DIASTOLIC BLOOD PRESSURE: 77 MMHG | HEART RATE: 69 BPM

## 2018-05-24 DIAGNOSIS — R73.03 PRE-DIABETES: ICD-10-CM

## 2018-05-24 DIAGNOSIS — I10 BENIGN ESSENTIAL HYPERTENSION: ICD-10-CM

## 2018-05-24 DIAGNOSIS — N95.2 VAGINAL ATROPHY: Primary | ICD-10-CM

## 2018-05-24 LAB
BUN SERPL-MCNC: 12.9 MG/DL (ref 7–19)
CALCIUM SERPL-MCNC: 9.8 MG/DL (ref 8.5–10.1)
CHLORIDE SERPLBLD-SCNC: 99.9 MMOL/L (ref 98–110)
CO2 SERPL-SCNC: 30.5 MMOL/L (ref 20–32)
CREAT SERPL-MCNC: 0.9 MG/DL (ref 0.5–1)
GFR SERPL CREATININE-BSD FRML MDRD: 67.2 ML/MIN/1.7 M2
GLUCOSE SERPL-MCNC: 94.1 MG'DL (ref 70–99)
HBA1C MFR BLD: 5.7 % (ref 4.1–5.7)
POTASSIUM SERPL-SCNC: 4.3 MMOL/DL (ref 3.2–4.6)
SODIUM SERPL-SCNC: 139.2 MMOL/L (ref 132–142)

## 2018-05-24 RX ORDER — TRIAMCINOLONE ACETONIDE 1 MG/G
CREAM TOPICAL
Qty: 30 G | Refills: 0 | Status: SHIPPED | OUTPATIENT
Start: 2018-05-24 | End: 2019-01-22

## 2018-05-24 NOTE — PATIENT INSTRUCTIONS
Back in 6 months for recheck  Cream at pharmacy     - triamcinolone (KENALOG) 0.1 % cream; Apply sparingly to affected area three times daily for 14 days.  Dispense: 30 g; Refill: 0

## 2018-05-24 NOTE — MR AVS SNAPSHOT
"              After Visit Summary   2018    Razia Dos Santos    MRN: [id number]           Patient Information     Date Of Birth          1955        Visit Information        Provider Department      2018 1:10 PM Mae Perez MD [facility]        Today's Diagnoses     Vaginal atrophy    -  1    Benign essential hypertension          Care Instructions    Back in 6 months for recheck  Cream at pharmacy     - triamcinolone (KENALOG) 0.1 % cream; Apply sparingly to affected area three times daily for 14 days.  Dispense: 30 g; Refill: 0              Follow-ups after your visit        Who to contact     Please call your clinic at [phone] to:    Ask questions about your health    Make or cancel appointments    Discuss your medicines    Learn about your test results    Speak to your doctor            Additional Information About Your Visit        MyChart Information     Genesis Media is an electronic gateway that provides easy, online access to your medical records. With Genesis Media, you can request a clinic appointment, read your test results, renew a prescription or communicate with your care team.     To sign up for Genesis Media visit the website at [email or web address]   You will be asked to enter the access code listed below, as well as some personal information. Please follow the directions to create your username and password.     Your access code is: [email or web address]  Expires: 2018  2:27 PM     Your access code will  in 90 days. If you need help or a new code, please contact your [facility] or call [phone] for assistance.        Care EveryWhere ID     This is your Care EveryWhere ID. This could be used by other organizations to access your [place] medical records  [id number]        Your Vitals Were     Pulse Temperature Respirations Height Pulse Oximetry BMI (Body Mass Index)    69 97.5  F (36.4  C) (Oral) 12 5' 0.5\" (153.7 cm) 97% 26.58 kg/m2       Blood " Pressure from Last 3 Encounters:   05/24/18 131/77   12/26/17 127/84   12/07/17 146/81    Weight from Last 3 Encounters:   05/24/18 138 lb 6.4 oz (62.8 kg)   12/07/17 139 lb 6.4 oz (63.2 kg)   03/13/17 139 lb 9.6 oz (63.3 kg)              We Performed the Following     Basic Metabolic Panel ([place])          Today's Medication Changes          These changes are accurate as of 5/24/18  2:28 PM.  If you have any questions, ask your nurse or doctor.               Start taking these medicines.        Dose/Directions    triamcinolone 0.1 % cream   Commonly known as:  KENALOG   Used for:  Vaginal atrophy   Started by:  Mae Perez MD        Apply sparingly to affected area three times daily for 14 days.   Quantity:  30 g   Refills:  0            Where to get your medicines      These medications were sent to [facility] - [place], [place] - [address], [place] [address]     Phone:  [phone]     triamcinolone 0.1 % cream                Primary Care Provider Office Phone # Fax #    Mae Perez [phone]       [facility] [address] [place] [address]        Equal Access to Services     LORI ANGEL AH: Shelly heardo Sojose franciscoali, waaxda luqadaha, qaybta kaalmada adeegyada, ahmet gomez. So [facility] [phone].    ATENCIÓN: Si habla español, tiene a batista disposición servicios gratuitos de asistencia lingüística. Llame al [phone].    We comply with applicable federal civil rights laws and [place] laws. We do not discriminate on the basis of race, color, national origin, age, disability, sex, sexual orientation, or gender identity.            Thank you!     Thank you for choosing [facility]  for your care. Our goal is always to provide you with excellent care. Hearing back from our patients is one way we can continue to improve our services. Please take a few minutes to complete the written survey  that you may receive in the mail after your visit with us. Thank you!             Your Updated Medication List - Protect others around you: Learn how to safely use, store and throw away your medicines at www.disposemymeds.org.          This list is accurate as of 5/24/18  2:28 PM.  Always use your most recent med list.                   Brand Name Dispense Instructions for use Diagnosis    calcium 600-200 MG-UNIT Tabs      Take  by mouth. Take 2 tablet daily        cholecalciferol 1000 UNIT tablet    vitamin D3    100 tablet    Take 1 tablet (1,000 Units) by mouth daily    Vitamin D deficiency       lisinopril 5 MG tablet    PRINIVIL/ZESTRIL    90 tablet    Take 1 tablet (5 mg) by mouth daily    Benign essential hypertension       MULTIvitamin  S Caps     90 capsule    Take 1 capsule by mouth daily Take 1 tablet daily    Pre-diabetes       simvastatin 20 MG tablet    ZOCOR    90 tablet    Take 1 tablet (20 mg) by mouth At Bedtime    Benign essential hypertension       triamcinolone 0.1 % cream    KENALOG    30 g    Apply sparingly to affected area three times daily for 14 days.    Vaginal atrophy

## 2018-05-24 NOTE — NURSING NOTE
Due to patient being non-English speaking/uses sign language, an  was used for this visit. Only for face-to-face interpretation by an external agency, date and length of interpretation can be found on the scanned worksheet.     name: Cynthia Main  Agency: [employer]  Language: Lynnette   Telephone number: [phone]  Type of interpretation: Face-to-face, spoken

## 2018-05-24 NOTE — LETTER
May 25, 2018      Razia Dos Santos  [address] [place] [id number]        Dear Razia,  Your labs look great! Your blood sugar is lower than last visit and so is your A1C which is a measure of your blood sugar over the past 3 months. Please let us know if you have any questions.  Please see below for your test results.    Resulted Orders   Basic Metabolic Panel ([place])   Result Value Ref Range    Urea Nitrogen 12.9 7.0 - 19.0 mg/dL    Calcium 9.8 8.5 - 10.1 mg/dL    Chloride 99.9 98.0 - 110.0 mmol/L    Carbon Dioxide 30.5 20.0 - 32.0 mmol/L    Creatinine 0.9 0.5 - 1.0 mg/dL    Glucose 94.1 70.0 - 99.0 mg'dL    Potassium 4.3 3.2 - 4.6 mmol/dL    Sodium 139.2 132.0 - 142.0 mmol/L    GFR Estimate 67.2 >60.0 mL/min/1.7 m2    GFR Estimate If Black 81.3 >60.0 mL/min/1.7 m2   Hemoglobin A1c ([facility])   Result Value Ref Range    Hemoglobin A1C 5.7 4.1 - 5.7 %       If you have any questions, please call the clinic to make an appointment.    Sincerely,    Mae Perez MD

## 2018-05-24 NOTE — PROGRESS NOTES
Preceptor Attestation:   Patient seen, evaluated and discussed with the resident. I have verified the content of the note, which accurately reflects my assessment of the patient and the plan of care.   Supervising Physician:  Tere Marrero MD

## 2018-05-24 NOTE — PROGRESS NOTES
There are no exam notes on file for this visit.    SUBJECTIVE  Razia Dos Santos is a 62 year old female with past medical history significant for    Patient Active Problem List   Diagnosis     Pure hypercholesterolemia     Insomnia     Nonspecific reaction to tuberculin skin test without active tuberculosis     Migraine     Abnormal Pap smear of cervix     Osteopenia     Glaucoma     Benign essential hypertension     Pre-diabetes     Chronic rhinitis     Breast cancer screening     Others present at the visit:       Presents for   Chief Complaint   Patient presents with     Recheck Medication     Blood Draw     Patient like to get glucose, liver panel and cholesterol, overall everything     Vaginal Problem     External vaginal itching and pelvic hair area, no other sign or sympton     Refill Request     lisinopril (PRINIVIL/ZESTRIL) 5 MG tablet, simvastatin (ZOCOR) 20 MG tablet, calcium 600-200 MG-UNIT TABS, cholecalciferol (VITAMIN D3) 1000 UNIT tablet and Multiple Vitamin (MULTIVITAMIN S) CAPS     Presents to clinic today for medication refills and lab work. She needs all of her medications refilled today, and is requesting a full lab workup including lipid panel, LFTs and A1c.     The patient also reports itching in her pubic hair area which has been intermittently present for the past 5 years. Has been prescribed topical triamcinolone in the past which resolves the itching for 2-3 weeks. After she stops using the cream, the itching returns after 1-2 weeks. There is no visible rash or skin irritation. She connor dysuria, vaginal pain/itching or vaginal discharge.    Additionally, she notes occasional throat pain radiating down toward her stomach. This happens very intermittently, sometimes it only occurs once every few weeks or months.  It has been unchanged since her previous appointment in December. The pain last occurred two weeks ago. It lasts for 5 seconds for up to 1 minute, then resolves without  "intervention. Nothing seems to provoke this discomfort, including food intake or position. No dysphagia or other associated symptoms. No h/o GERD. She is curious today as to what may be causing this pain.     OBJECTIVE:  Vitals: /77 (BP Location: Left arm, Patient Position: Sitting, Cuff Size: Adult Regular)  Pulse 69  Temp 97.5  F (36.4  C) (Oral)  Resp 12  Ht 5' 0.5\" (153.7 cm)  Wt 138 lb 6.4 oz (62.8 kg)  SpO2 97%  BMI 26.58 kg/m2  BMI= Body mass index is 26.58 kg/(m^2).  Gen: Alert, pleasant and cooperative, NAD  CV: RRR, no murmurs, rubs or gallops   Lungs: CTA bilaterally, no wheezes or crackles   : Mild vaginal atrophy noted, no discharge or rash  MSK: No LE edema    ASSESSMENT AND PLAN:      Razia was seen today for recheck medication, blood draw, vaginal problem and refill request.    Diagnoses and all orders for this visit:    Vaginal atrophy  Pubic itching is likely related to post-menopausal dryness and mild atrophy. Will re-prescribe steroid cream today as this has consistently worked for her in the past. If she continues to have itching in the future, could consider topical estrogen.   -     triamcinolone (KENALOG) 0.1 % cream; Apply sparingly to affected area three times daily for 14 days.    Benign essential hypertension  -     Basic Metabolic Panel ([place])    Pre-diabetes  Elevated ASCVD risk: Medications were refilled today. We discussed obtaining labs. It was explained that LFTs and repeat lipids are not indicated today, and she agrees that this is acceptable, with the plan to repeat lipid panel in one year. We will obtain an A1c and BMP today.     Intermittent throat pain: Regarding her throat pain, given it's intermittent nature and brevity of symptoms, could possibly be due to esophageal spasm. There are no alarming symptoms, such as dysphagia or choking. Discussed that if symptoms worsen, increase in frequency or she develops dysphagia, she should return to clinic.    Patient " Instructions   Back in 6 months for recheck  Cream at pharmacy     - triamcinolone (KENALOG) 0.1 % cream; Apply sparingly to affected area three times daily for 14 days.  Dispense: 30 g; Refill: 0        Follow up in 6 months for f/u med check    Padma Bello, MS4    Mae Perez MD, PGY-2  [facility]  Pager: [phone]    I was present with the medical student who participated in the service and in the documentation of this note. I have verified the history and personally performed the physical exam and medical decision making, and have verified the content of the note, which accurately reflects my assessment of the patient and the plan of care.   Mae Perez MD         Patient staffed with Dr. Marrero.

## 2018-09-09 ENCOUNTER — HEALTH MAINTENANCE LETTER (OUTPATIENT)
Age: 63
End: 2018-09-09

## 2018-10-30 ENCOUNTER — OFFICE VISIT (OUTPATIENT)
Dept: FAMILY MEDICINE | Facility: CLINIC | Age: 63
End: 2018-10-30
Payer: COMMERCIAL

## 2018-10-30 VITALS
BODY MASS INDEX: 25.89 KG/M2 | TEMPERATURE: 97.7 F | HEART RATE: 70 BPM | RESPIRATION RATE: 12 BRPM | OXYGEN SATURATION: 99 % | WEIGHT: 134.8 LBS | SYSTOLIC BLOOD PRESSURE: 125 MMHG | DIASTOLIC BLOOD PRESSURE: 79 MMHG

## 2018-10-30 DIAGNOSIS — R15.2 FECAL URGENCY: ICD-10-CM

## 2018-10-30 DIAGNOSIS — N95.0 POST-MENOPAUSAL BLEEDING: Primary | ICD-10-CM

## 2018-10-30 NOTE — LETTER
October 31, 2018      [address]  [place] [facility] [address]        Dear Razia,    Here is the information regarding your upcoming appointment    [facility]  [address], [address]  [place], [address]    Appointment: Monday November 19, 2018  Arrival Time:  2:10 pm  Provider:  Terri Vincent PA-C    A Sami  will be requested for your appointment    Please bring a copy of your insurance card and photo ID    If you cannot make this appointment please call [phone] to reschedule    Thank you,    [facility]  Referral Department  [phone]

## 2018-10-30 NOTE — PATIENT INSTRUCTIONS
To do list:  1) Schedule pelvic ultrasound  2) Return to clinic for endometrial biopsy  3) Referral for colorectal surgery  4) Stop using vinegar wash - ok to use triamcinolone for vaginal itching, ONLY ON THE OUTSIDE    October 31, 2018 at 8:55 am Called [employer] for a [place]  [place] ID #[id number] - spoke with Razia. She is unable to go on 11/1/18, 11/2/18, 11/6/18, 11/7/18, 11/10/18, 11/11/18, 11/12/18, 11/15/18, 11/16/18, 11/20/18, 11/21/18, 11/29/18, 11/30/18. Advised will call her back with the details once scheduled.    COLORECTAL SURGERY REFERRAL  [facility]  [address] [place] [address], [address]  [facility], [address]  Phone: [phone]    Appointment: Monday November 19, 2018  Arrival Time:  2:10 pm  Provider:  Terri Vincent PA-C    A [place]  will be requested for your appointment    Please bring a copy of your insurance card and photo ID    If you cannot make this appointment please call [phone] to reschedule    October 31, 2018 at 9:13 am Called [employer] for a [place]  ID #[id number] - patient aware. [facility]    November 1, 2018 at  Referral, demographics office notes, lab and previous colonoscopy faxed to [phone]. [facility]      PELVIC ULTRASOUND:  Left voicemail message using [employer] for patient to contact referral clinic to schedule.  Kaylah Martel  11/5/18 11/7/18 11/8/18    Letter mailed to patient to contact clinic referrals to schedule.  Kaylah Martel  11/8/18    [facility]  Radiology Department  [address] [place] [address]  [phone]  Date:  Thursday December 6, 2018  Time:  10:15 AM  Please have a full bladder by drinking 32 ounces of water and refrain from using the bathroom 1 hour prior to appointment.  Dorinda Vo ([employer]) has been requested for this appointment.  Given to patient.  Kaylah Martel  11/9/18

## 2018-10-30 NOTE — PROGRESS NOTES
Preceptor Attestation:   Patient seen, evaluated and discussed with the resident. I have verified the content of the note, which accurately reflects my assessment of the patient and the plan of care.   Supervising Physician:  Wes Linda MD

## 2018-10-30 NOTE — LETTER
November 8, 2018      Razia Dos Santos  [address] [place] [id number]        Dear Razia,    At your last visit Dr. Richardson placed a referral for PELVIC ULTRASOUND, I have been unable to reach you to schedule.    Please contact the clinic number below so I can assist you in scheduling this referral.      Sincerely,  Kaylah Martel  Referral Coordinator  [phone]

## 2018-10-30 NOTE — MR AVS SNAPSHOT
After Visit Summary   10/30/2018    Razia Dos Santos    MRN: [id number]           Patient Information     Date Of Birth          1955        Visit Information        Provider Department      10/30/2018 4:10 PM Chastity Richardson MD [facility]        Today's Diagnoses     Post-menopausal bleeding    -  1    Fecal urgency          Care Instructions    To do list:  1) Schedule pelvic ultrasound  2) Return to clinic for endometrial biopsy  3) Referral for colorectal surgery  4) Stop using vinegar wash - ok to use triamcinolone for vaginal itching, ONLY ON THE OUTSIDE          Follow-ups after your visit        Additional Services     COLORECTAL SURGERY REFERRAL       Patient prefers to be called    Reason for Referral: Fecal urgency without incontinence     needed: Yes  Language: Venezuelan    May leave message on voicemail: Yes                  Your next 10 appointments already scheduled     Nov 09, 2018  9:00 AM CST   PHYSICAL with Mae Perez MD   [facility] ([facility])    [address] [place] [address]   [phone]              Future tests that were ordered for you today     Open Future Orders        Priority Expected Expires Ordered    US Pelvic Complete with Transvaginal Routine  10/30/2019 10/30/2018    COLORECTAL SURGERY REFERRAL Routine  10/30/2019 10/30/2018            Who to contact     Please call your clinic at [phone] to:    Ask questions about your health    Make or cancel appointments    Discuss your medicines    Learn about your test results    Speak to your doctor            Additional Information About Your Visit        MyChart Information     [employer] is an electronic gateway that provides easy, online access to your medical records. With [employer], you can request a clinic appointment, read your test results, renew a prescription or communicate with your care team.     To sign up for [employer] visit the website at [email or web address]    You will be asked to enter the access code listed below, as well as some personal information. Please follow the directions to create your username and password.     Your access code is: [email or web address]  Expires: 2019  5:03 PM     Your access code will  in 90 days. If you need help or a new code, please contact your [facility] or call [phone] for assistance.        Care EveryWhere ID     This is your Care EveryWhere ID. This could be used by other organizations to access your [facility] medical records  [id number]        Your Vitals Were     Pulse Temperature Respirations Pulse Oximetry BMI (Body Mass Index)       70 97.7  F (36.5  C) (Oral) 12 99% 25.89 kg/m2        Blood Pressure from Last 3 Encounters:   10/30/18 125/79   18 131/77   17 127/84    Weight from Last 3 Encounters:   10/30/18 134 lb 12.8 oz (61.1 kg)   18 138 lb 6.4 oz (62.8 kg)   17 139 lb 6.4 oz (63.2 kg)               Primary Care Provider Office Phone # Fax #    Mae Perez [phone]       [facility] [address] [place] [address]        Equal Access to Services     LORI NAGEL AH: Hadii aad ku hadasho Soomaali, waaxda luqadaha, qaybta kaalmada adeegyada, waxay idiin hayaan glenis gomez. So [facility] [phone].    ATENCIÓN: Si habla español, tiene a batista disposición servicios gratuitos de asistencia lingüística. Llame al [phone].    We comply with applicable federal civil rights laws and [place] laws. We do not discriminate on the basis of race, color, national origin, age, disability, sex, sexual orientation, or gender identity.            Thank you!     Thank you for choosing [facility]  for your care. Our goal is always to provide you with excellent care. Hearing back from our patients is one way we can continue to improve our services. Please take a few minutes to complete the written survey that you may receive in  the mail after your visit with us. Thank you!             Your Updated Medication List - Protect others around you: Learn how to safely use, store and throw away your medicines at www.disposemymeds.org.          This list is accurate as of 10/30/18  5:03 PM.  Always use your most recent med list.                   Brand Name Dispense Instructions for use Diagnosis    calcium 600-200 MG-UNIT Tabs      Take  by mouth. Take 2 tablet daily        cholecalciferol 1000 UNIT tablet    vitamin D3    100 tablet    Take 1 tablet (1,000 Units) by mouth daily    Vitamin D deficiency       lisinopril 5 MG tablet    PRINIVIL/ZESTRIL    90 tablet    Take 1 tablet (5 mg) by mouth daily    Benign essential hypertension       MULTIvitamin  S Caps     90 capsule    Take 1 capsule by mouth daily Take 1 tablet daily    Pre-diabetes       simvastatin 20 MG tablet    ZOCOR    90 tablet    Take 1 tablet (20 mg) by mouth At Bedtime    Benign essential hypertension       triamcinolone 0.1 % cream    KENALOG    30 g    Apply sparingly to affected area three times daily for 14 days.    Vaginal atrophy

## 2018-10-30 NOTE — NURSING NOTE
Due to patient being non-English speaking/uses sign language, an  was used for this visit. Only for face-to-face interpretation by an external agency, date and length of interpretation can be found on the scanned worksheet.     name: Dorinda Main  Agency: [employer]  Language: Lynnette   Telephone number: [phone]   Type of interpretation: Face-to-face, spoken

## 2018-10-30 NOTE — PROGRESS NOTES
SUBJECTIVE       Razia Dos Santos is a 63 year old  female with a PMH significant for:     Patient Active Problem List   Diagnosis     Pure hypercholesterolemia     Insomnia     Nonspecific reaction to tuberculin skin test without active tuberculosis     Migraine     Abnormal Pap smear of cervix     Osteopenia     Glaucoma     Benign essential hypertension     Pre-diabetes     Chronic rhinitis     Breast cancer screening     She presents with a few concerns.    First, reports vaginal bleeding starting 2 days ago. She has been post-menopausal for some time. Noted a few drops of blood on her underpants. During that day, she also saw blood on the toilet tissue with wiping. No gushes or clots. No hematuria. Pt is relatively certain this is coming from the vagina.  No abdominal pain. She has been seen recently by Dr. Perez for vaginal itching. States that rather than the prescribed triamcinolone, she has been using vinegar mixed with water on her external genitalia. States that this sometimes burns with application. She used this on the day prior to the vaginal bleeding episode. PAP in 2015 was normal.    Also notes stool urgency for 6 months. States that she will wake up at night and have the urgent need to have a bowel movement. However, when she gets to the toilet, nothing comes out. Doesn't happen during the day. No stool incontinence. Usually has soft stools daily. She had colonoscopy in 2015. This was completely normal.         REVIEW OF SYSTEMS     See HPI        OBJECTIVE     Vitals:    10/30/18 1609   BP: 125/79   BP Location: Left arm   Patient Position: Sitting   Cuff Size: Adult Regular   Pulse: 70   Resp: 12   Temp: 97.7  F (36.5  C)   TempSrc: Oral   SpO2: 99%   Weight: 134 lb 12.8 oz (61.1 kg)     Body mass index is 25.89 kg/(m^2).    Gen: Well-appearing elderly female. Alert, oriented and appropriate. NAD  HEENT: MMM  CV: Normal capillary refill  Pulm: Breathing comfortably on room air  : Grossly  normal-appearing external genitalia. There are 2, 1-2 mm areas of shallow erosion present over the L labia majora. No active bleeding or discharge. No surrounding erythema. Vaginal mucosa appears atrophic. No gross lesions present  Recta: Normal external rectum. No external hemorrhoids or lesions appreciated    No results found for this or any previous visit (from the past 24 hour(s)).        ASSESSMENT AND PLAN     1. Post-menopausal bleeding  Unclear etiology at this point. Pt does have a normal pap in 2015. Possible that the bleeding was related to the small lesions on the labia majora, may have something to do with vinegar wash, however this cannot be confirmed. Given post-menopausal status, do feel that further work-up is indicated, including pelvic US and endometrial biopsy. Discussed with the patient that she should stop using the vinegar wash; try triamcinolone as prescribed if itching is ongoing. She has a future appt scheduled shortly for a complete physical; this will be changed to endometrial biopsy appt.   - US Pelvic Complete with Transvaginal; Future    2. Fecal urgency  Unclear etiology, dillan with no fecal incontinence or change in stool. Referral to colorectal surgery for further work-up and management.   - COLORECTAL SURGERY REFERRAL; Future      RTC within 2-4 weeks for follow-up of vaginal bleeding or sooner if develops new or worsening symptoms.    Chastity Richardson I precepted today with Wes Linda MD.

## 2018-11-09 ENCOUNTER — OFFICE VISIT (OUTPATIENT)
Dept: FAMILY MEDICINE | Facility: CLINIC | Age: 63
End: 2018-11-09
Payer: COMMERCIAL

## 2018-11-09 ENCOUNTER — RECORDS - HEALTHEAST (OUTPATIENT)
Dept: ADMINISTRATIVE | Facility: OTHER | Age: 63
End: 2018-11-09

## 2018-11-09 VITALS
WEIGHT: 134.6 LBS | BODY MASS INDEX: 25.85 KG/M2 | RESPIRATION RATE: 16 BRPM | SYSTOLIC BLOOD PRESSURE: 145 MMHG | OXYGEN SATURATION: 98 % | HEART RATE: 70 BPM | TEMPERATURE: 97.4 F | DIASTOLIC BLOOD PRESSURE: 78 MMHG

## 2018-11-09 DIAGNOSIS — I10 BENIGN ESSENTIAL HYPERTENSION: ICD-10-CM

## 2018-11-09 DIAGNOSIS — Z00.00 ROUTINE GENERAL MEDICAL EXAMINATION AT A HEALTH CARE FACILITY: Primary | ICD-10-CM

## 2018-11-09 DIAGNOSIS — N95.0 POSTMENOPAUSAL BLEEDING: ICD-10-CM

## 2018-11-09 DIAGNOSIS — R73.03 PREDIABETES: ICD-10-CM

## 2018-11-09 DIAGNOSIS — Z23 INFLUENZA VACCINE NEEDED: ICD-10-CM

## 2018-11-09 LAB
BUN SERPL-MCNC: 17.3 MG/DL (ref 7–19)
CALCIUM SERPL-MCNC: 9.7 MG/DL (ref 8.5–10.1)
CHLORIDE SERPLBLD-SCNC: 106 MMOL/L (ref 98–110)
CO2 SERPL-SCNC: 32.4 MMOL/L (ref 20–32)
CREAT SERPL-MCNC: 0.8 MG/DL (ref 0.5–1)
GFR SERPL CREATININE-BSD FRML MDRD: 76.8 ML/MIN/1.7 M2
GLUCOSE SERPL-MCNC: 109.4 MG'DL (ref 70–99)
HBA1C MFR BLD: 5.7 % (ref 4.1–5.7)
POTASSIUM SERPL-SCNC: 4.3 MMOL/DL (ref 3.2–4.6)
SODIUM SERPL-SCNC: 143.9 MMOL/L (ref 132–142)

## 2018-11-09 NOTE — PROGRESS NOTES
Female Physical Note    Concerns today: follow up from 10/30 visit. No vaginal bleeding since that visit, would like to do pelvic US first and then get biopsy if abnormal findings. Will schedule US after visit today. Not sexually active. Still having fecal urgency, has colorectal appt scheduled later this month.    A Frisian  was used for  this visit.     ROS:  CONSTITUTIONAL: no fatigue, no unexpected change in weight.  - has lost weight intentionally through diet changes  SKIN: no worrisome rashes, no worrisome moles, no worrisome lesions  EYES: no acute vision problems or changes  ENT: no ear problems, no mouth problems, no throat problems  RESP: no significant cough, no shortness of breath  CV: no chest pain, no palpitations, no new or worsening peripheral edema  GI: no nausea, no vomiting, no constipation, no diarrhea    Sexually Active: No  Sexual concerns: No   Contraception:not needed  Menarche:   No LMP recorded. Patient is postmenopausal.    Last Pap Smear Date: 2015 normal  Abnormal Pap History: See problem list    Patient Active Problem List   Diagnosis     Pure hypercholesterolemia     Insomnia     Nonspecific reaction to tuberculin skin test without active tuberculosis     Migraine     Abnormal Pap smear of cervix     Osteopenia     Glaucoma     Benign essential hypertension     Pre-diabetes     Chronic rhinitis     Breast cancer screening       Current Outpatient Prescriptions   Medication Sig Dispense Refill     calcium 600-200 MG-UNIT TABS Take  by mouth. Take 2 tablet daily       cholecalciferol (VITAMIN D3) 1000 UNIT tablet Take 1 tablet (1,000 Units) by mouth daily 100 tablet 3     lisinopril (PRINIVIL/ZESTRIL) 5 MG tablet Take 1 tablet (5 mg) by mouth daily 90 tablet 3     Multiple Vitamin (MULTIVITAMIN  S) CAPS Take 1 capsule by mouth daily Take 1 tablet daily 90 capsule 0     simvastatin (ZOCOR) 20 MG tablet Take 1 tablet (20 mg) by mouth At Bedtime 90 tablet 4      triamcinolone (KENALOG) 0.1 % cream Apply sparingly to affected area three times daily for 14 days. 30 g 0       Past Medical History:   Diagnosis Date     HPV (low risk) 3/14/2013    3/2013:  Normal pap, positive for low risk HPV.   H/O ASCUS in 2005, +HPV of undetermined risk in 2012 No history of high grade lesion     Hypertension         Family History        Negative family history of: Diabetes, Coronary Artery Disease, Hypertension, Hyperlipidemia, Breast Cancer, Cancer - colorectal, Ovarian Cancer, Prostate Cancer, Other Cancer, Mental Illness, Cerebrovascular Disease, Anesthesia Reaction, Asthma, Osteoporosis, Known Genetic Syndrome, Obesity, Unknown/Adopted          Problem List Medication List and Allergy List were reviewed.    Patient is an established patient of this clinic..    Social History   Substance Use Topics     Smoking status: Never Smoker     Smokeless tobacco: Never Used     Alcohol use No       Children ? yes      Has anyone hurt you physically, for example by pushing, hitting, slapping or kicking you or forcing you to have sex? Denies  Do you feel threatened or controlled by a partner, ex-partner or anyone in your life? Denies    RISK BEHAVIORS AND HEALTHY HABITS:  Tobacco Use/Smoking: None  Illicit Drug Use: None  Do you use alcohol? No  Diet (5-7 servings of fruits/veg daily): Yes   Exercise (30 min accumulated most days):Yes  Dental Care: Yes   Calcium 1500 mg/d:  Yes  Seat Belt Use: Yes     Pap/HPV cotest every 5 years for women 30-65   Recommended and patient accepted testing.  Breast CA Screening (>41 yo or 10 y before 1st degree relative diagnosis): Recommended and patient accepted testing.  CV Risk based on Pooled Cohort Risk:    The 10-year ASCVD risk score (Felix TRAV Jr, et al., 2013) is: 8.5%    Values used to calculate the score:      Age: 63 years      Sex: Female      Is Non- : No      Diabetic: No      Tobacco smoker: No      Systolic Blood Pressure: 145  mmHg      Is BP treated: Yes      HDL Cholesterol: 59.5 mg/dL      Total Cholesterol: 212.3 mg/dL     Pooled Cohort Risk Calculator    Immunization History   Administered Date(s) Administered     HEPA 04/24/2006, 11/13/2006     HepB 04/24/2006, 11/13/2006, 12/13/2011     Influenza (IIV3) PF 12/13/2011     Influenza Vaccine, 3 YRS +, IM (QUADRIVALENT W/PRESERVATIVES) 10/15/2015, 12/07/2017     TD (ADULT, 7+) 11/09/1999     Tdap (Adacel,Boostrix) 12/13/2011     Typhoid IM 04/24/2006    Reviewed Immunization Record Today    EXAMINATION:   /78  Pulse 70  Temp 97.4  F (36.3  C) (Oral)  Resp 16  Wt 134 lb 9.6 oz (61.1 kg)  SpO2 98%  BMI 25.85 kg/m2  GENERAL: healthy, alert and no distress  EYES: Eyes grossly normal to inspection, extraocular movements - intact, and PERRL  HENT: ear canals- normal; TMs- normal; Nose- normal; Mouth- no ulcers, no lesions  NECK: no tenderness, no adenopathy, no asymmetry, no masses, no stiffness; thyroid- normal to palpation  RESP: lungs clear to auscultation - no rales, no rhonchi, no wheezes  BREAST: deferred  CV: regular rates and rhythm, normal S1 S2, no S3 or S4 and no murmur, no click or rub -  ABDOMEN: soft, no tenderness, no  hepatosplenomegaly, no masses, normal bowel sounds  MS: extremities- no gross deformities noted, no edema  SKIN: no suspicious lesions, no rashes  NEURO: strength and tone- normal, sensory exam- grossly normal, mentation- intact, speech- normal, reflexes- symmetric  BACK: no CVA tenderness, no paralumbar tenderness  - female: atrophic appearing vaginal mucosa with minimal rugation. No lesions on external genitalia. cervix- normal, adnexae- normal; uterus- normal, no masses, no discharge  RECTAL- deferred  PSYCH: Alert and oriented times 3; speech- coherent , normal rate and volume; able to articulate logical thoughts, able to abstract reason, no tangential thoughts, no hallucinations or delusions, affect- normal  LYMPHATICS: ant. cervical- normal,  post. cervical- normal    ASSESSMENT/PLAN:  1. Routine general medical examination at a health care facility  Reviewed health care maintenance. Hx of CIN1 and ASCUS on coloposcopy/prior pap smears but 2 normal since (see problem list for detailed history). Will repeat cotesting today, could consider stopping cervical cancer screening at age 65 due to age and 3 normal Paps if this one is normal. Not due for colorectal screen yet. Re: mammogram - she had one in 01/2018 showing dense breast so we did discuss increased risks of breast cancer and she prefers to do a repeat mammogram 1 q 1 year.  - [facility] Cytology ([facility])  - MA SCREENING DIGITAL BILAT; Future    2. Benign essential hypertension  BP has been elevated last few visits, only on 5 mg of lisinopril so will increase dose today.   - Basic Metabolic Panel ([place])  - lisinopril (PRINIVIL/ZESTRIL) 10 MG tablet; Take 1 tablet (10 mg) by mouth daily  Dispense: 90 tablet; Refill: 1    3. Prediabetes  Last a1c 5.7 in 05/2018. Tried brief course of metformin in the past but didn't tolerate so no meds currently. Has been making dietary changes. Pt would like to check today to see if she's making any progress.  - Hemoglobin A1c ([facility])    4. Influenza vaccine needed  - ADMIN VACCINE, INITIAL  - FLU VAC QUADRIVLENT SPLIT VIRUS IM 0.5ml dosage    5. Postmenopausal bleeding  - Will watch for US results and consider endometrial biopsy if stripe >4mm. Does have atrophic vaginal wall on exam, recommended lubrication vs vaginal estrogen if bothersome, patient doesn't want to treat at this time    Mae Perez MD, PGY-3  [facility] Medicine Residency  Pager: [phone]    Staffed with Dr. Duran

## 2018-11-09 NOTE — PATIENT INSTRUCTIONS
Make ultrasound appointment for bleeding  Colorectal appointment November 19th  Last colonoscopy in 2015 - normal, repeat in 2025 if no issues    Preventive Health Recommendations  Female Ages 50 - 64    Yearly exam: See your health care provider every year in order to  o Review health changes.   o Discuss preventive care.    o Review your medicines if your doctor has prescribed any.      Get a Pap test every three years (unless you have an abnormal result and your provider advises testing more often).    If you get Pap tests with HPV test, you only need to test every 5 years, unless you have an abnormal result.     You do not need a Pap test if your uterus was removed (hysterectomy) and you have not had cancer.    You should be tested each year for STDs (sexually transmitted diseases) if you're at risk.     Have a mammogram every 1 to 2 years.    Have a colonoscopy at age 50, or have a yearly FIT test (stool test). These exams screen for colon cancer.      Have a cholesterol test every 5 years, or more often if advised.    Have a diabetes test (fasting glucose) every three years. If you are at risk for diabetes, you should have this test more often.     If you are at risk for osteoporosis (brittle bone disease), think about having a bone density scan (DEXA).    Shots: Get a flu shot each year. Get a tetanus shot every 10 years.    Nutrition:     Eat at least 5 servings of fruits and vegetables each day.    Eat whole-grain bread, whole-wheat pasta and brown rice instead of white grains and rice.    Get adequate Calcium and Vitamin D.     Lifestyle    Exercise at least 150 minutes a week (30 minutes a day, 5 days a week). This will help you control your weight and prevent disease.    Limit alcohol to one drink per day.    No smoking.     Wear sunscreen to prevent skin cancer.     See your dentist every six months for an exam and cleaning.    See your eye doctor every 1 to 2 years.    MAMMOGRAM SCREENING:  Patient not  due for mammogram until 1/20/19.  Kaylah J Pack  11/9/18

## 2018-11-09 NOTE — NURSING NOTE
Due to patient being non-English speaking/uses sign language, an  was used for this visit. Only for face-to-face interpretation by an external agency, date and length of interpretation can be found on the scanned worksheet.     name: Dorinda Main  Agency: [employer]  Language: Albanian   Telephone number: [phone]  Type of interpretation: Face-to-face, spoken    Injectable influenza vaccine documentation    1. Has the patient received the information for the influenza vaccine? YES    2. Does the patient have a severe allergy to eggs (Patients with a severe egg allergy should be assessed by a medical provider, RN, or clinical pharmacist. If they receive the influenza vaccine, please have them observed for 15 minutes.)? No    3. Has the patient had an allergic reaction to previous influenza vaccines? No    4. Has the patient had any severe allergic reactions to past influenza vaccines ? No       5. Does patient have a history of Guillain-[place] syndrome? No      Based on responses above, I administered the influenza vaccine.  Winter Stovall

## 2018-11-09 NOTE — PROGRESS NOTES
Preceptor Attestation:   Patient seen, evaluated and discussed with the resident. I have verified the content of the note, which accurately reflects my assessment of the patient and the plan of care.   Supervising Physician:  Loki Duran MD

## 2018-11-09 NOTE — MR AVS SNAPSHOT
After Visit Summary   11/9/2018    Razia Dos Santos    MRN: [id number]           Patient Information     Date Of Birth          1955        Visit Information        Provider Department      11/9/2018 9:00 AM Mae Perez MD [facility]        Today's Diagnoses     Routine general medical examination at a health care facility    -  1    Benign essential hypertension        Prediabetes        Influenza vaccine needed          Care Instructions    Make ultrasound appointment for bleeding  Colorectal appointment November 19th  Last colonoscopy in 2015 - normal, repeat in 2025 if no issues    Preventive Health Recommendations  Female Ages 50 - 64    Yearly exam: See your health care provider every year in order to  o Review health changes.   o Discuss preventive care.    o Review your medicines if your doctor has prescribed any.      Get a Pap test every three years (unless you have an abnormal result and your provider advises testing more often).    If you get Pap tests with HPV test, you only need to test every 5 years, unless you have an abnormal result.     You do not need a Pap test if your uterus was removed (hysterectomy) and you have not had cancer.    You should be tested each year for STDs (sexually transmitted diseases) if you're at risk.     Have a mammogram every 1 to 2 years.    Have a colonoscopy at age 50, or have a yearly FIT test (stool test). These exams screen for colon cancer.      Have a cholesterol test every 5 years, or more often if advised.    Have a diabetes test (fasting glucose) every three years. If you are at risk for diabetes, you should have this test more often.     If you are at risk for osteoporosis (brittle bone disease), think about having a bone density scan (DEXA).    Shots: Get a flu shot each year. Get a tetanus shot every 10 years.    Nutrition:     Eat at least 5 servings of fruits and vegetables each day.    Eat whole-grain bread, whole-wheat pasta and  brown rice instead of white grains and rice.    Get adequate Calcium and Vitamin D.     Lifestyle    Exercise at least 150 minutes a week (30 minutes a day, 5 days a week). This will help you control your weight and prevent disease.    Limit alcohol to one drink per day.    No smoking.     Wear sunscreen to prevent skin cancer.     See your dentist every six months for an exam and cleaning.    See your eye doctor every 1 to 2 years.            Follow-ups after your visit        Future tests that were ordered for you today     Open Future Orders        Priority Expected Expires Ordered    MA SCREENING DIGITAL BILAT Routine  2019            Who to contact     Please call your clinic at [phone] to:    Ask questions about your health    Make or cancel appointments    Discuss your medicines    Learn about your test results    Speak to your doctor            Additional Information About Your Visit        [employer] Information     WHATT is an electronic gateway that provides easy, online access to your medical records. With WHATT, you can request a clinic appointment, read your test results, renew a prescription or communicate with your care team.     To sign up for WHATT visit the website at [email or web address]   You will be asked to enter the access code listed below, as well as some personal information. Please follow the directions to create your username and password.     Your access code is: [email or web address]  Expires: 2019  4:03 PM     Your access code will  in 90 days. If you need help or a new code, please contact your [facility] or call [phone] for assistance.        Care EveryWhere ID     This is your Care EveryWhere ID. This could be used by other organizations to access your [facility] medical records  [id number]        Your Vitals Were     Pulse Temperature Respirations Pulse Oximetry BMI (Body Mass Index)       70 97.4  F (36.3  C)  (Oral) 16 98% 25.85 kg/m2        Blood Pressure from Last 3 Encounters:   11/09/18 145/78   10/30/18 125/79   05/24/18 131/77    Weight from Last 3 Encounters:   11/09/18 134 lb 9.6 oz (61.1 kg)   10/30/18 134 lb 12.8 oz (61.1 kg)   05/24/18 138 lb 6.4 oz (62.8 kg)              We Performed the Following     ADMIN VACCINE, INITIAL     Basic Metabolic Panel ([place])     FLU VAC QUADRIVLENT SPLIT VIRUS IM 0.5ml dosage     GYN Cytology ([facility])     Hemoglobin A1c ([facility])        Primary Care Provider Office Phone # Fax #    Mae Perez [phone]       [facility] [address] [place] [address]        Equal Access to Services     LORI ANGEL : Hadii aad ku hadasho Soerick, waaxda luqadaha, qaybta kaalmada adeegyada, ahmet gomez. So [facility] [phone].    ATENCIÓN: Si habla español, tiene a batista disposición servicios gratuitos de asistencia lingüística. Jovana al [phone].    We comply with applicable federal civil rights laws and [place] laws. We do not discriminate on the basis of race, color, national origin, age, disability, sex, sexual orientation, or gender identity.            Thank you!     Thank you for choosing [facility]  for your care. Our goal is always to provide you with excellent care. Hearing back from our patients is one way we can continue to improve our services. Please take a few minutes to complete the written survey that you may receive in the mail after your visit with us. Thank you!             Your Updated Medication List - Protect others around you: Learn how to safely use, store and throw away your medicines at www.disposemymeds.org.          This list is accurate as of 11/9/18 10:08 AM.  Always use your most recent med list.                   Brand Name Dispense Instructions for use Diagnosis    calcium 600-200 MG-UNIT Tabs      Take  by mouth. Take 2 tablet daily        cholecalciferol 1000 UNIT tablet     vitamin D3    100 tablet    Take 1 tablet (1,000 Units) by mouth daily    Vitamin D deficiency       lisinopril 5 MG tablet    PRINIVIL/ZESTRIL    90 tablet    Take 1 tablet (5 mg) by mouth daily    Benign essential hypertension       MULTIvitamin  S Caps     90 capsule    Take 1 capsule by mouth daily Take 1 tablet daily    Pre-diabetes       simvastatin 20 MG tablet    ZOCOR    90 tablet    Take 1 tablet (20 mg) by mouth At Bedtime    Benign essential hypertension       triamcinolone 0.1 % cream    KENALOG    30 g    Apply sparingly to affected area three times daily for 14 days.    Vaginal atrophy

## 2018-11-09 NOTE — LETTER
November 20, 2018      Razia Dos Santos  [address] [place] [id number]        Dear Razia,  Your Pap smear was completely normal. Your labs looked good and your A1c (diabetes test) is the same as it was 6 months ago. Call clinic if any questions.   Please see below for your test results.    Resulted Orders   GYN Cytology ([facility])   Result Value Ref Range    Lab AP Case Report SEE RESULTS BELOW       Comment:      Gynecologic Cytology Report                       Case: [id number]                                     Authorizing Provider:  Loki Duran MD  Collected:             11/09/2018 1022              First Screen:          ELICEO Graham    Received:              11/10/2018 1232                                     (ASCP)                                                                         Rescreen:              ELICEO Nielsen                                                                              (ASCP)                                                                         Specimen:    SUREPATH PAP, SCREENING, Endocervical/cervical                                               Lab AP Gyn Interpretation SEE RESULTS BELOW       Comment:      Negative for squamous intraepithelial lesion or malignancy  Electronically signed by ELICEO Nielsen (ASCP) on 11/19/2018 at    2:55 PM      Lab AP Malignant? Normal Normal    Specimen adequacy:       Satisfactory for evaluation, endocervical/transformation zone component present    HPV Reflex? Yes regardless of result     High Risk? No     Last Mens Period menopausal     Lab AP Abnormal Bleeding Yes     Lab AP Patient Status menopausal     Lab AP Birth Control/Hormones None     Lab AP Previous Normal 2015     Lab AP Previous Abnl yes ASCUS in the past     Lab AP Cervical Appearance normal     Narrative    Test performed by:  [facility]  [address] [place] [address]   Hemoglobin A1c ([facility])   Result  Value Ref Range    Hemoglobin A1C 5.7 4.1 - 5.7 %   Basic Metabolic Panel ([place])   Result Value Ref Range    Urea Nitrogen 17.3 7.0 - 19.0 mg/dL    Calcium 9.7 8.5 - 10.1 mg/dL    Chloride 106.0 98.0 - 110.0 mmol/L    Carbon Dioxide 32.4 (H) 20.0 - 32.0 mmol/L    Creatinine 0.8 0.5 - 1.0 mg/dL    Glucose 109.4 (H) 70.0 - 99.0 mg'dL    Potassium 4.3 3.2 - 4.6 mmol/dL    Sodium 143.9 (H) 132.0 - 142.0 mmol/L    GFR Estimate 76.8 >60.0 mL/min/1.7 m2    GFR Estimate If Black >90 >60.0 mL/min/1.7 m2   HPV [place] PCR ([facility])   Result Value Ref Range    HPV Source SurePath     HPV 16 DNA Negative NEG    HPV 18 DNA Negative NEG    Other HR HPV Negative NEG    Final Diagnosis SEE NOTES       Comment:      This patient's sample is negative for HPV DNA.  This test was developed and its performance characteristics determined by the  [facility]. It  has not been cleared or approved by the FDA. The laboratory is regulated under  CLIA as qualified to perform high-complexity testing. This test is used for  clinical purposes. It should not be regarded as investigational or for  research.  (Note)  METHODOLOGY:  The Roche robert 4800 system uses automated extraction,  simultaneous amplification of HPV (L1 region) and beta-globin,  followed by  real time detection of fluorescent labeled HPV and beta  globin using specific oligonucleotide probes . The test specifically  identifies types HPV 16 DNA and HPV 18 DNA while concurrently  detecting the rest of the high risk types (31, 33, 35, 39, 45, 51,  52, 56, 58, 59, 66 or 68).     COMMENTS:  This test is not intended for use as a screening device  for women under age 30 with normal cervical   cytology.  Results should  be correlated with cytologic and histologic findings. Close clinical  followup is recommended.         Specimen Description Cervical Cells       Comment:      PERFORMED AT  [facility]  [address] [address]       Narrative    Test performed by:  [facility]  [address], [place] [address]       If you have any questions, please call the clinic to make an appointment.    Sincerely,    Mae Perez MD

## 2018-11-10 RX ORDER — LISINOPRIL 10 MG/1
10 TABLET ORAL DAILY
Qty: 90 TABLET | Refills: 1 | Status: SHIPPED | OUTPATIENT
Start: 2018-11-10 | End: 2018-12-04

## 2018-11-12 LAB
FINAL DIAGNOSIS: NORMAL
HPV 16 DNA: NEGATIVE
HPV 18 DNA: NEGATIVE
HPV SOURCE: NORMAL
OTHER HR HPV: NEGATIVE
SPECIMEN DESCRIPTION: NORMAL

## 2018-11-19 ENCOUNTER — RECORDS - HEALTHEAST (OUTPATIENT)
Dept: ADMINISTRATIVE | Facility: OTHER | Age: 63
End: 2018-11-19

## 2018-11-19 ENCOUNTER — TRANSFERRED RECORDS (OUTPATIENT)
Dept: HEALTH INFORMATION MANAGEMENT | Facility: CLINIC | Age: 63
End: 2018-11-19

## 2018-11-19 LAB
CYTOLOGY CVX/VAG DOC THIN PREP: NORMAL
HIGH RISK?: NO
HPV REFLEX?: NORMAL
LAB AP ABNORMAL BLEEDING: YES
LAB AP BIRTH CONTROL/HORMONES: NORMAL
LAB AP CASE REPORT: NORMAL
LAB AP CERVICAL APPEARANCE: NORMAL
LAB AP MALIGNANT?: NORMAL
LAB AP PATIENT STATUS: NORMAL
LAB AP PREVIOUS ABNL: NORMAL
LAB AP PREVIOUS NORMAL: 2015
LAST MENS PERIOD: NORMAL
SPECIMEN ADEQUACY:: NORMAL

## 2018-12-04 DIAGNOSIS — I10 BENIGN ESSENTIAL HYPERTENSION: ICD-10-CM

## 2018-12-05 NOTE — TELEPHONE ENCOUNTER
Received refill request for lisinopril 10 mg, however no pharmacy listed. Routed to [facility] - please clarify pharmacy with patient and I will send refill there. Mae Perez

## 2018-12-06 ENCOUNTER — TELEPHONE (OUTPATIENT)
Dept: FAMILY MEDICINE | Facility: CLINIC | Age: 63
End: 2018-12-06

## 2018-12-06 ENCOUNTER — HOSPITAL ENCOUNTER (OUTPATIENT)
Dept: ULTRASOUND IMAGING | Facility: HOSPITAL | Age: 63
Discharge: HOME | End: 2018-12-06

## 2018-12-06 DIAGNOSIS — N95.0 POST-MENOPAUSAL BLEEDING: ICD-10-CM

## 2018-12-06 RX ORDER — LISINOPRIL 10 MG/1
10 TABLET ORAL DAILY
Qty: 90 TABLET | Refills: 1 | Status: SHIPPED | OUTPATIENT
Start: 2018-12-06 | End: 2019-01-22

## 2018-12-06 NOTE — TELEPHONE ENCOUNTER
Heidi: Could you please call patient (needs ) and find out the following?     1) Did her vaginal bleeding resolve?  2) Did she get the ultrasound that we had ordered for her?    If she didn't get the US, let me know and I'll reorder this because I think she still needs this, even if her bleeding has not happened since, and then she should make a clinic appointment with me to follow up afterward. If she is having any other questions, please have her make clinic appt. If you can't get a hold of her, please have  call her or send letter to make an appt to discuss this.     Mae Perez MD, PGY-3  [facility] Medicine Residency  Pager: [phone]

## 2018-12-11 NOTE — TELEPHONE ENCOUNTER
Reviewed US records - Pt needs appointment at clinic to discuss this further. Please contact her and help alanna make this appointment if she doesn't have one already. Mae Perez. Routed to [facility]    INDICATION: Postmenopausal bleeding.  TECHNIQUE: Transabdominal scans were performed. Endovaginal ultrasound was performed to better visualize the adnexa.  COMPARISON: None.     FINDINGS:  Uterus measures 4.4 x 2.9 x 2.2 cm. Normal uterus with no masses.    The endometrium is fluid-filled. Excluding the intrinsic fluid, the peripheral endometrial tissue measures roughly 5 mm thickness.    Right ovary not visualized, from positioning, size and/or overlying bowel gas.    Left ovary measures 1.7 x 1.6 x 1 cm. Normal left ovary. Normal arterial duplex.    No significant free fluid in the cul-de-sac.

## 2019-01-19 ENCOUNTER — TRANSFERRED RECORDS (OUTPATIENT)
Dept: HEALTH INFORMATION MANAGEMENT | Facility: CLINIC | Age: 64
End: 2019-01-19

## 2019-01-22 ENCOUNTER — OFFICE VISIT (OUTPATIENT)
Dept: FAMILY MEDICINE | Facility: CLINIC | Age: 64
End: 2019-01-22
Payer: COMMERCIAL

## 2019-01-22 VITALS
RESPIRATION RATE: 20 BRPM | HEIGHT: 61 IN | SYSTOLIC BLOOD PRESSURE: 160 MMHG | DIASTOLIC BLOOD PRESSURE: 87 MMHG | HEART RATE: 77 BPM | WEIGHT: 133 LBS | OXYGEN SATURATION: 93 % | BODY MASS INDEX: 25.11 KG/M2 | TEMPERATURE: 98 F

## 2019-01-22 DIAGNOSIS — Z12.31 ENCOUNTER FOR SCREENING MAMMOGRAM FOR BREAST CANCER: ICD-10-CM

## 2019-01-22 DIAGNOSIS — I10 BENIGN ESSENTIAL HYPERTENSION: ICD-10-CM

## 2019-01-22 DIAGNOSIS — R93.89 ENDOMETRIAL STRIPE INCREASED: ICD-10-CM

## 2019-01-22 DIAGNOSIS — L85.3 DRY SKIN: Primary | ICD-10-CM

## 2019-01-22 RX ORDER — LISINOPRIL 10 MG/1
10 TABLET ORAL DAILY
Qty: 90 TABLET | Refills: 1 | Status: SHIPPED | OUTPATIENT
Start: 2019-01-22 | End: 2020-01-13

## 2019-01-22 RX ORDER — SIMVASTATIN 20 MG
20 TABLET ORAL AT BEDTIME
Qty: 90 TABLET | Refills: 4 | Status: SHIPPED | OUTPATIENT
Start: 2019-01-22 | End: 2020-01-13

## 2019-01-22 RX ORDER — MINERAL OIL/UREA/PEG/WATER
LOTION (ML) TOPICAL 3 TIMES DAILY
Qty: 226 G | Refills: 11 | Status: SHIPPED | OUTPATIENT
Start: 2019-01-22 | End: 2019-03-05

## 2019-01-22 ASSESSMENT — ANXIETY QUESTIONNAIRES
6. BECOMING EASILY ANNOYED OR IRRITABLE: SEVERAL DAYS
3. WORRYING TOO MUCH ABOUT DIFFERENT THINGS: SEVERAL DAYS
5. BEING SO RESTLESS THAT IT IS HARD TO SIT STILL: SEVERAL DAYS
IF YOU CHECKED OFF ANY PROBLEMS ON THIS QUESTIONNAIRE, HOW DIFFICULT HAVE THESE PROBLEMS MADE IT FOR YOU TO DO YOUR WORK, TAKE CARE OF THINGS AT HOME, OR GET ALONG WITH OTHER PEOPLE: NOT DIFFICULT AT ALL
1. FEELING NERVOUS, ANXIOUS, OR ON EDGE: SEVERAL DAYS
7. FEELING AFRAID AS IF SOMETHING AWFUL MIGHT HAPPEN: MORE THAN HALF THE DAYS
2. NOT BEING ABLE TO STOP OR CONTROL WORRYING: SEVERAL DAYS
GAD7 TOTAL SCORE: 8

## 2019-01-22 ASSESSMENT — PATIENT HEALTH QUESTIONNAIRE - PHQ9
5. POOR APPETITE OR OVEREATING: SEVERAL DAYS
SUM OF ALL RESPONSES TO PHQ QUESTIONS 1-9: 6

## 2019-01-22 ASSESSMENT — MIFFLIN-ST. JEOR: SCORE: 1084.78

## 2019-01-22 NOTE — NURSING NOTE
Chief Complaint   Patient presents with     RECHECK     Endometriosis F/U- Procedure and Test Results      Xander Ling CMA    Due to patient being non-English speaking/uses sign language, an  was used for this visit. Only for face-to-face interpretation by an external agency, date and length of interpretation can be found on the scanned worksheet.     name: Dorinda Main  Agency: [employer]  Language: Kosovan   Telephone number: [phone]  Type of interpretation: Face-to-face, spoken     Xander Ling CMA

## 2019-01-22 NOTE — PROGRESS NOTES
Preceptor Attestation:   Patient seen, evaluated and discussed with the resident. I have verified the content of the note, which accurately reflects my assessment of the patient and the plan of care.   Supervising Physician:  Pa Pedro MD

## 2019-01-22 NOTE — LETTER
January 22, 2019      [address]  [facility] [id number]        Dear Razia,    Here is the information regarding your upcoming appointment    [facility]  [address]  [place] [address]    Appointment:  Tuesday February 19, 2019  Arrival Time:  10:15 am  Provider:  Dr. Bose     Estelle has requested a Tristanian  for your appointment     Please bring a copy of your insurance card and photo ID    If you cannot make this appointment please call [phone] to reschedule     Thank you,      Kelli Gallego CMA, Referral Coordinator  [employer]  [phone]

## 2019-01-22 NOTE — PATIENT INSTRUCTIONS
Back in 3 months  Call clinic in 1 week if you don't hear from us about gynecology referral  Dr. Mae Perez    OB/GYN REFERRAL  January 22, 2019 at 1:39 pm Called [employer] for a German  Long ID #[id number] - Left a message on recorder to call back at [phone]; Called Sukhdeep  at [phone]. Razia is not available on January 24, 25, 29, 30 and not available on February 4, 7, 8, 12, 13, 18, 21, 22, 26, 27, 2019. She prefers a morning appointment and to go to the [place] location. Advised will call back with appointment details.    OB/GYN REFERRAL  [facility]  Phone: [phone]  Fax: [phone]    [facility]  [address], [address]  [place] [address]    Appointment:  Tuesday February 19, 2019  Arrival Time:  10:15 am  Provider:  Dr. Bose     Estelle has requested a German  for your appointment     Please bring a copy of your insurance card and photo ID    If you cannot make this appointment please call [phone] to reschedule     January 22, 2019 at 1:59 pm letter sent. Referral, demographics, labs and office note faxed to [phone]. [facility]

## 2019-01-22 NOTE — PROGRESS NOTES
"     SUBJECTIVE     Chief Complaint   Patient presents with     RECHECK     Endometriosis F/U- Procedure and Test Results        Subjective: Razia Dos Santos is a 63 year old female with prediabetes, HTN, osteopenia here for follow-up.    Was seen in clinic in October 2018, seen for postmenopausal bleeding.  A pelvic ultrasound was performed in December and showed a fluid-filled endometrial cavity with an endometrial stripe of 5 mm.  She is here today for follow-up of results.    She has not had any bleeding since her last visit.  Last period was over 10 years ago.  Pap smear and HPV were normal in 2018.  She also wants a referral for mammogram as she does these yearly, last one showing dense breasts but otherwise normal.    She did not take her blood pressure medicine this morning.  She does not have a blood pressure cuff at home.    Also would like refill of triamcinolone for rash.  States she gets itching over her entire body, no rash currently present today.  Heat is set normal in her house.    CV: No chest pain or palpitations.  Resp: No shortness of breath or cough    PMH/PSH, FamHx, Meds, Allergies reviewed and updated as needed.    Social History     Socioeconomic History     Marital status:      Spouse name: None     Number of children: None     Years of education: None     Highest education level: None   Social Needs     Financial resource strain: None     Food insecurity - worry: None     Food insecurity - inability: None     Transportation needs - medical: None     Transportation needs - non-medical: None   Occupational History     None   Tobacco Use     Smoking status: Never Smoker     Smokeless tobacco: Never Used   Substance and Sexual Activity     Alcohol use: No     Drug use: No     Sexual activity: No   Other Topics Concern     None   Social History Narrative     None           OBJECTIVE     /87   Pulse 77   Temp 98  F (36.7  C) (Oral)   Resp 20   Ht 1.54 m (5' 0.63\")   Wt 60.3 kg (133 lb) "   SpO2 93%   Breastfeeding? No   BMI 25.44 kg/m    Body mass index is 25.44 kg/m .    Physical exam:  Gen: No acute distress  Skin: Mild xerosis of upper extremities bilaterally, no erythema  Psych: Mood and affect appropriate, mentation appears normal.    No results found for this or any previous visit (from the past 24 hour(s)).      ASSESSMENT AND PLAN     Razia Dos Santos is a 63 year old female here for follow-up of results    1. Endometrial stripe increased  Patient has not had any occurrence again of the uterine bleeding, however has an endometrial stripe of 5 mm and a fluid-filled cavity on ultrasound.  Recommended endometrial biopsy with gynecology follow-up.  Recommended that we do the endometrial biopsy today, patient prefers to go to gynecology for both biopsy and results.  She has no further questions at this time.  - OB/GYN REFERRAL; Future    2. Benign essential hypertension  Elevated blood pressure today, did not take medication.  Will refill medication at this time.  No rash, likely  - simvastatin (ZOCOR) 20 MG tablet; Take 1 tablet (20 mg) by mouth At Bedtime  Dispense: 90 tablet; Refill: 4  - lisinopril (PRINIVIL/ZESTRIL) 10 MG tablet; Take 1 tablet (10 mg) by mouth daily  Dispense: 90 tablet; Refill: 1  - order for DME; Equipment being ordered: Digital home blood pressure monitor kit  Dispense: 1 Units; Refill: 1    3. Dry skin  No rash today.  Likely xerotic skin from dry air in winter, discussed humidifier use, turning down heat, and frequent emollient use  - Emollient (EUCERIN SKIN CALMING) CREA; Externally apply topically 3 times daily  Dispense: 226 g; Refill: 11    4. Encounter for screening mammogram for breast cancer  Patient at slightly higher risk due to heterogenously dense breasts. Last mammogram 1 year ago, would like to repeat  - MA SCREENING DIGITAL BILAT; Future    RTC in 3 months for blood pressure check    Mae Perez MD, PGY-3  I precepted today with Pa Pedro MD.

## 2019-01-23 ASSESSMENT — ANXIETY QUESTIONNAIRES: GAD7 TOTAL SCORE: 8

## 2019-02-14 ENCOUNTER — OFFICE VISIT (OUTPATIENT)
Dept: FAMILY MEDICINE | Facility: CLINIC | Age: 64
End: 2019-02-14
Payer: COMMERCIAL

## 2019-02-14 VITALS
SYSTOLIC BLOOD PRESSURE: 125 MMHG | HEART RATE: 69 BPM | WEIGHT: 136 LBS | BODY MASS INDEX: 26.01 KG/M2 | DIASTOLIC BLOOD PRESSURE: 83 MMHG | OXYGEN SATURATION: 97 % | RESPIRATION RATE: 14 BRPM | TEMPERATURE: 97.4 F

## 2019-02-14 DIAGNOSIS — R30.0 DYSURIA: Primary | ICD-10-CM

## 2019-02-14 DIAGNOSIS — N30.00 ACUTE CYSTITIS WITHOUT HEMATURIA: ICD-10-CM

## 2019-02-14 LAB
BACTERIA: NORMAL
BILIRUBIN UR: NEGATIVE
BLOOD UR: ABNORMAL
CASTS: NORMAL /LPF
CRYSTAL URINE: NORMAL /LPF
EPITHELIAL CELLS UR: <2 /LPF (ref 0–2)
GLUCOSE URINE: NEGATIVE
KETONES UR QL: NEGATIVE
LEUKOCYTE ESTERASE UR: ABNORMAL
MUCOUS URINE: NORMAL LPF
NITRITE UR QL STRIP: NEGATIVE
PH UR STRIP: 7 [PH] (ref 5–7)
PROTEIN UR: NEGATIVE
RBC URINE: NORMAL /HPF
SP GR UR STRIP: 1.01
UROBILINOGEN UR STRIP-ACNC: ABNORMAL
WBC URINE: NORMAL /HPF

## 2019-02-14 RX ORDER — SULFAMETHOXAZOLE/TRIMETHOPRIM 800-160 MG
1 TABLET ORAL 2 TIMES DAILY
Qty: 28 TABLET | Refills: 0 | Status: SHIPPED | OUTPATIENT
Start: 2019-02-14 | End: 2019-02-28

## 2019-02-14 NOTE — PROGRESS NOTES
S: Razia Dos Santos is a 63 year old female who returns for has some pain with urination and urgency for about 4 days   No recent UTI   No back pain     Patients states that main concern today is urinary urgency    PMHX/PSHX/MEDS/ALLERGIES/SHX/FHX reviewed and updated in Epic.      ROS:  General: No fevers, chills  Head: No headache  Ears: No acute change in hearing.    CV: No chest pain or palpitations.  Resp: No shortness of breath.  No cough. No hemoptysis.  GI: No nausea, vomiting, constipation, diarrhea  : No urinary pains    O: /83   Pulse 69   Temp 97.4  F (36.3  C) (Oral)   Resp 14   Wt 61.7 kg (136 lb)   SpO2 97%   BMI 26.01 kg/m     Gen:  Well nourished and in NAD    Neck: supple without lymphadenopathy  CV:  RRR  - no murmurs, rubs, or gallups,   Pulm:  CTAB, no wheezes/rales/rhonchi, good air entry   ABD: soft, nontender, no masses, no rebound, BS intact throughout  Extrem: no cyanosis, edema or clubbing  Psych: Euthymic    CVA: negative  (R30.0) Dysuria  (primary encounter diagnosis)    Plan: Urinalysis([facility]), Urine Microscopic ([facility])      Positive LE  Septra for 5 days  Fluids    RTC in 1 to 2weeks, for follow up of UTI or sooner if develops new or worsening symptoms.    Triston Jalloh

## 2019-02-14 NOTE — NURSING NOTE
Due to patient being non-English speaking/uses sign language, an  was used for this visit. Only for face-to-face interpretation by an external agency, date and length of interpretation can be found on the scanned worksheet.     name: Dorinda TRIPP   Agency: [employer]  Language: Radha    Telephone number: [phone]  Type of interpretation: Face-to-face, spoken

## 2019-02-19 ENCOUNTER — TRANSFERRED RECORDS (OUTPATIENT)
Dept: HEALTH INFORMATION MANAGEMENT | Facility: CLINIC | Age: 64
End: 2019-02-19

## 2019-02-25 ENCOUNTER — HOSPITAL ENCOUNTER (OUTPATIENT)
Dept: MAMMOGRAPHY | Facility: CLINIC | Age: 64
Discharge: HOME | End: 2019-02-25

## 2019-02-25 ENCOUNTER — TRANSFERRED RECORDS (OUTPATIENT)
Dept: HEALTH INFORMATION MANAGEMENT | Facility: CLINIC | Age: 64
End: 2019-02-25

## 2019-02-25 DIAGNOSIS — Z12.31 SCREENING MAMMOGRAM, ENCOUNTER FOR: ICD-10-CM

## 2019-03-05 ENCOUNTER — OFFICE VISIT (OUTPATIENT)
Dept: FAMILY MEDICINE | Facility: CLINIC | Age: 64
End: 2019-03-05
Payer: COMMERCIAL

## 2019-03-05 VITALS
OXYGEN SATURATION: 99 % | SYSTOLIC BLOOD PRESSURE: 120 MMHG | WEIGHT: 133.2 LBS | TEMPERATURE: 97.9 F | DIASTOLIC BLOOD PRESSURE: 75 MMHG | BODY MASS INDEX: 25.48 KG/M2 | RESPIRATION RATE: 20 BRPM | HEART RATE: 67 BPM

## 2019-03-05 DIAGNOSIS — R93.89 ENDOMETRIAL THICKENING ON ULTRASOUND: Primary | ICD-10-CM

## 2019-03-05 DIAGNOSIS — M54.50 ACUTE MIDLINE LOW BACK PAIN WITHOUT SCIATICA: ICD-10-CM

## 2019-03-05 DIAGNOSIS — E55.9 VITAMIN D DEFICIENCY: ICD-10-CM

## 2019-03-05 NOTE — PATIENT INSTRUCTIONS
We will contact you after ultrasound for next steps  Come back this fall to recheck diabetes with new primary care doctor  Dr. Mae Perez    US PELVIS COMPLETE  [facility]  Phone: [phone]  Fax: [phone]    [facility]  [address] [facility] [address], [address]  [place]  [address]    Appointment:  April 11, 2019  Arrival Time:  10:15 am    You will need to drink 32 ounces of clear liquids beginning 1 hour prior to your appointment without voiding/urinating      Maryjo has requested  Dorinda Vo for your appointment     Please bring a copy of your insurance card and photo ID    If you cannot make this appointment, please call [phone] to reschedule    March 5, 2019 at 12:00 pm printed appointment details given to patient and  Dorinda Vo. No additional questions at this time. Referral and demographics faxed to [phone]. [facility]

## 2019-03-05 NOTE — NURSING NOTE
Due to patient being non-English speaking/uses sign language, an  was used for this visit. Only for face-to-face interpretation by an external agency, date and length of interpretation can be found on the scanned worksheet.     name: Dorinda Main  Agency: [employer]  Language: Latvian   Telephone number: [phone]  Type of interpretation: Face-to-face, spoken

## 2019-03-05 NOTE — PROGRESS NOTES
SUBJECTIVE     Chief Complaint   Patient presents with     RECHECK     come here today to follow up from last visit per patient.      Refill Request     need more refill on Vitamin D per patient.      Medication Reconciliation     reviewed.        Subjective: Razia Dos Santos is a 63 year old female with PMH including thickened endometrial stripe, prediabetes here for follow-up.    Seen 2/14/19 for increased urinary frequency, +pyruia so treated with bactrim for UTI.    No more increased urgency. No dysuria or hematuria. No fever. Does have some back pain but no trauma, numbness, tingling, weakness.     Also recently evaluated for post-menopausal bleeding and thickened endometrial stripe of 5 mm by OBGYN who was unable to do endometrial biopsy 2/2 cervical stenosis. Reviewed records. Recommended repeat US in 2 months. If no more bleeding and endometrium 5 mm or less, continue to watch. If more bleeding or thickening of lining, will need to strongly recommend hysteroscopy and D&C.    PMH/PSH, FamHx, Meds, Allergies reviewed and updated as needed.    Social History     Socioeconomic History     Marital status:      Spouse name: Not on file     Number of children: Not on file     Years of education: Not on file     Highest education level: Not on file   Occupational History     Not on file   Social Needs     Financial resource strain: Not on file     Food insecurity:     Worry: Not on file     Inability: Not on file     Transportation needs:     Medical: Not on file     Non-medical: Not on file   Tobacco Use     Smoking status: Never Smoker     Smokeless tobacco: Never Used   Substance and Sexual Activity     Alcohol use: No     Drug use: No     Sexual activity: No   Lifestyle     Physical activity:     Days per week: Not on file     Minutes per session: Not on file     Stress: Not on file   Relationships     Social connections:     Talks on phone: Not on file     Gets together: Not on file     Attends Hindu  service: Not on file     Active member of club or organization: Not on file     Attends meetings of clubs or organizations: Not on file     Relationship status: Not on file     Intimate partner violence:     Fear of current or ex partner: Not on file     Emotionally abused: Not on file     Physically abused: Not on file     Forced sexual activity: Not on file   Other Topics Concern     Not on file   Social History Narrative     Not on file           OBJECTIVE     /75 (BP Location: Right arm, Patient Position: Sitting, Cuff Size: Adult Regular)   Pulse 67   Temp 97.9  F (36.6  C) (Oral)   Resp 20   Wt 60.4 kg (133 lb 3.2 oz)   SpO2 99%   BMI 25.48 kg/m    Body mass index is 25.48 kg/m .    Physical exam:  Gen: No acute distress  Back: No tenderness along spinous processes. No CVA tenderness. Mild diffuse paraspinous tenderness. Negative straight leg raise bilaterally. Normal gait.   Psych: Mood and affect appropriate, mentation appears normal.    No results found for this or any previous visit (from the past 24 hour(s)).      ASSESSMENT AND PLAN     Razia Dos Santos is a 63 year old female here for follow-up. UTI sx resolved.    1. Endometrial thickening on ultrasound  Discussed plan as noted above. Will have her set up an US in the next month. Back if bleeding or new concerns.   - US PELVIS COMPLETE; Future    2. Vitamin D deficiency  Refilled.  - vitamin D3 (CHOLECALCIFEROL) 1000 units (25 mcg) tablet; Take 1 tablet (1,000 Units) by mouth daily  Dispense: 100 tablet; Refill: 3    3. Acute midline low back pain without sciatica  No red flags, mild, recommended conservative stretches and tylenol and massage.       Mae Perez MD, PGY-3  I precepted today with Wes Linda MD.

## 2019-05-22 ENCOUNTER — OFFICE VISIT (OUTPATIENT)
Dept: FAMILY MEDICINE | Facility: CLINIC | Age: 64
End: 2019-05-22
Payer: COMMERCIAL

## 2019-05-22 VITALS
WEIGHT: 138 LBS | TEMPERATURE: 98.2 F | RESPIRATION RATE: 16 BRPM | HEART RATE: 78 BPM | OXYGEN SATURATION: 99 % | DIASTOLIC BLOOD PRESSURE: 76 MMHG | BODY MASS INDEX: 26.39 KG/M2 | SYSTOLIC BLOOD PRESSURE: 112 MMHG

## 2019-05-22 DIAGNOSIS — Z00.00 HEALTH CARE MAINTENANCE: ICD-10-CM

## 2019-05-22 DIAGNOSIS — N95.0 POST-MENOPAUSAL BLEEDING: Primary | ICD-10-CM

## 2019-05-22 NOTE — PROGRESS NOTES
Preceptor Attestation:   Patient seen, evaluated and discussed with the resident. I have verified the content of the note, which accurately reflects my assessment of the patient and the plan of care.   Supervising Physician:  Darin Long MD

## 2019-05-22 NOTE — PROGRESS NOTES
SUBJECTIVE     Chief Complaint   Patient presents with     Follow Up     Pt is here to follow up.     Medication Reconciliation     Complete.        Subjective: Razia Dos Santos is a 63 year old female with PMH including pre diabetes and osteopenia here for followup.    Had an episode of post menopausal bleeding earlier this year. Nothing since. US at that time showed endometrial stripe 4-5 mm 12/2018. Sent to OB for endometrial biopsy, unable to complete this due to cervical stenosis. Recommended conservative management with US. Pelvic US 5/9/19 - minimal fluid in endometrial cavity with 3 mm thickness. No significant increase. Endometrium is smooth without focal obstructing lesion. Sent letter with reassuring results, but patient wanted to come in and discuss further before I leave this summer.     No further bleeding. No abdominal pain. No dysuria, hematuria. No vaginal dryness. No other concerns.  Mammo completed earlier 2019.   Dexa done 2018 - osteopenia, on calcium, consider repeat in 2020  Last colonoscopy 3 years ago per [facility].     ROS:    General: No fevers or weight loss  Skin: No new rashes or lesions.  Abd: No nausea/vomiting abdominal pain  : No dysuria or hematuria      PMH/PSH, FamHx, Meds, Allergies reviewed and updated as needed.    Social History     Socioeconomic History     Marital status:      Spouse name: None     Number of children: None     Years of education: None     Highest education level: None   Occupational History     None   Social Needs     Financial resource strain: None     Food insecurity:     Worry: None     Inability: None     Transportation needs:     Medical: None     Non-medical: None   Tobacco Use     Smoking status: Never Smoker     Smokeless tobacco: Never Used   Substance and Sexual Activity     Alcohol use: No     Drug use: No     Sexual activity: Never   Lifestyle     Physical activity:     Days per week: None     Minutes per session: None     Stress: None    Relationships     Social connections:     Talks on phone: None     Gets together: None     Attends Cheondoism service: None     Active member of club or organization: None     Attends meetings of clubs or organizations: None     Relationship status: None     Intimate partner violence:     Fear of current or ex partner: None     Emotionally abused: None     Physically abused: None     Forced sexual activity: None   Other Topics Concern     None   Social History Narrative     None           OBJECTIVE     /76 (BP Location: Left arm, Patient Position: Sitting, Cuff Size: Adult Regular)   Pulse 78   Temp 98.2  F (36.8  C) (Oral)   Resp 16   Wt 62.6 kg (138 lb)   SpO2 99%   BMI 26.39 kg/m    Body mass index is 26.39 kg/m .    Physical exam:  Gen: No acute distress  Psych: Mood and affect appropriate, mentation appears normal.    No results found for this or any previous visit (from the past 24 hour(s)).      ASSESSMENT AND PLAN     Razia Dos Santos is a 63 year old female here for follow-up results    1. Post-menopausal bleeding  Resolved. Could consider repeat US in 1 year, recommended coming back to clinic if concerns develop or repeat bleeding    2. Health care maintenance  Mammo completed earlier 2019.   Dexa done 2018 - osteopenia, on calcium, consider repeat in 2020  Last colonoscopy 3 years ago per [facility]. CHERISE for [facility] today.     Mae Perez MD, PGY-3  I precepted today with Darin Long MD.

## 2019-12-05 ENCOUNTER — OFFICE VISIT (OUTPATIENT)
Dept: FAMILY MEDICINE | Facility: CLINIC | Age: 64
End: 2019-12-05
Payer: COMMERCIAL

## 2019-12-05 VITALS
WEIGHT: 136.2 LBS | SYSTOLIC BLOOD PRESSURE: 146 MMHG | DIASTOLIC BLOOD PRESSURE: 86 MMHG | TEMPERATURE: 97.5 F | RESPIRATION RATE: 16 BRPM | BODY MASS INDEX: 25.71 KG/M2 | HEIGHT: 61 IN | HEART RATE: 71 BPM

## 2019-12-05 DIAGNOSIS — N89.8 VAGINAL ITCHING: Primary | ICD-10-CM

## 2019-12-05 DIAGNOSIS — Z87.898 HISTORY OF PREDIABETES: ICD-10-CM

## 2019-12-05 LAB
BACTERIA: NORMAL
CLUE CELLS: NORMAL
HBA1C MFR BLD: 5.3 % (ref 4.1–5.7)
MOTILE TRICHOMONAS: NEGATIVE
ODOR: NORMAL
PH WET PREP: NORMAL (ref 3.8–4.5)
WBC WET PREP: <2 (ref 2–5)
YEAST: NORMAL

## 2019-12-05 RX ORDER — TRIAMCINOLONE ACETONIDE 5 MG/G
1 OINTMENT TOPICAL 2 TIMES DAILY
Qty: 60 G | Refills: 0 | Status: SHIPPED | OUTPATIENT
Start: 2019-12-05 | End: 2020-01-04

## 2019-12-05 ASSESSMENT — MIFFLIN-ST. JEOR: SCORE: 1094.3

## 2019-12-05 NOTE — PATIENT INSTRUCTIONS
1. Vaginal itching  Please use for a month, and if still having significant symptoms, come back in to see me  - triamcinolone (KENALOG) 0.5 % external ointment; Apply 1 g topically 2 times daily  Dispense: 60 g; Refill: 0  - benzocaine-resorcinol (VAGISIL) 5-2 % vaginal cream; Place 1 Units vaginally 2 times daily as needed (vaginal itching)  Dispense: 60 g; Refill: 1    2. History of prediabetes  Go down to lab.  - Hemoglobin A1c ([facility])      Thank you for coming in today!     Take care,  Dr. Clark

## 2019-12-05 NOTE — PROGRESS NOTES
Preceptor Attestation:   Patient seen, evaluated and discussed with the resident. I have verified the content of the note, which accurately reflects my assessment of the patient and the plan of care.   Supervising Physician:  Walter He MD.

## 2019-12-05 NOTE — PROGRESS NOTES
ASSESSMENT AND PLAN     1. Vaginal itching  Has had vaginal itching intermittently for the past 2-3 years. Had been prescribed a cream in the past with improvement in her symptoms. This cream was discontinued and symptoms have returned. She has had so much itching that she has caused excoriations around her vaginal area and bleeding occurred. On exam, vaginal tissue has lost it's rugae, and appears smooth. No excoriations noted. No discolorations or masses noted to be concerned for lichen planus or neoplasm. Discussed with patient that this is likely vulvovaginal atrophy, which occurs commonly when postmenopausal. Will run a wet prep, although not having abnormal vaginal discharge. Will have patient try external topical steroids for a month and return if no improvement in symptoms. Also, prescribed vagisil for added lubrication. The next steps would be prescribing a topical estrogen cream to see if symptoms improve.   - triamcinolone (KENALOG) 0.5 % external ointment; Apply 1 g topically 2 times daily  Dispense: 60 g; Refill: 0  - benzocaine-resorcinol (VAGISIL) 5-2 % vaginal cream; Place 1 Units vaginally 2 times daily as needed (vaginal itching)  Dispense: 60 g; Refill: 1    2. History of prediabetes  A1c 5.7% on 11/9/18. Would like to have rechecked today. No concerns.   - Hemoglobin A1c ([facility])    RTC in one month for follow up of vaginal itching or sooner if develops new or worsening symptoms.    The patient was seen by, and discussed with, Dr. Walter eH, who agrees with the plan.    Queta Clark MD PGY2  [facility]         SUBJECTIVE       Razia Dos Santos is a 64 year old  female with a PMH significant for:     Patient Active Problem List   Diagnosis     Pure hypercholesterolemia     Insomnia     Nonspecific reaction to tuberculin skin test without active tuberculosis     Migraine     Abnormal Pap smear of cervix     Osteopenia     Glaucoma     Benign essential hypertension     Pre-diabetes      "Chronic rhinitis     Breast cancer screening     She presents with multiple complaints.    Prediabetes: Would like to have Hgb A1c checked today. Hgb A1c on 11/9/18 and was 5.7%.Has been moving around a lot at work. 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 Eats a Serbian diet, which is \"loaded with vegetables, fish, and rice.\" Denies vision changes, headache, chest pain, dyspnea, polyuria, polydipsia.     Vaginal itching: Last year, was given cream to use on her vagina. Cream helped improve symptoms, so stopped using the cream. This has been ongoing for a few years. Would like to be prescribed a cream again. Denies vaginal discharge. Does have excoriations after scratching area for a long time. No other vaginal bleeding or cramping.         PMH, Medications and Allergies were reviewed and updated as needed.        REVIEW OF SYSTEMS     ROS reviewed in HPI.         OBJECTIVE     Vitals:    12/05/19 1239   BP: (!) 146/86   BP Location: Left arm   Pulse: 71   Resp: 16   Temp: 97.5  F (36.4  C)   TempSrc: Oral   Weight: 61.8 kg (136 lb 3.2 oz)   Height: 1.54 m (5' 0.63\")     Body mass index is 26.05 kg/m .    General: Alert, well appearing, no acute distress  Eyes: PERRL, EOMI, normal conjunctiva  Lungs: Clear to auscultation bilaterally, no wheezing, no rhonchi, no crackles  CV: Regular rate and rhythm, no murmur, no rubs, no gallops  Abdomen: Soft, nontender, non-distended, no masses palpated, normal bowel sounds  : Vaginal wall is smooth, loss of rugae, no excoriations or discoloration, appears pink. No discharge noted.   Skin: Dry, no cyanosis, no abrasions, no discoloration.    No results found for this or any previous visit (from the past 24 hour(s)).        "

## 2019-12-05 NOTE — NURSING NOTE
Medicare Wellness Visit  Health Risk Assessment        Visual Acuity:  Wears Reading Glasses only        FALL RISK ASSESSMENT 12/5/2019   Fallen 2 or more times in the past year? No   Any fall with injury in the past year? No            Health Risk Assessment / Review of Systems     Constitutional: Any fevers or night sweats?  YES Sometimes after waking up she has sweaty forehead.     Eyes:  Vision problems    YES Glaucoma, cataracts    Hearing Do you feel you have hearing loss?  No     Cardiovascular: Any chest pain, fast or irregular heart beat, calf pain with walking?     No           Respiratory:   Any breathing problems or cough?   No     Gastrointestinal: Any stomach or stool problems?   No      Genitourinary: Do you have difficulty controlling urination?   No     Muscles and Joints: Any joint stiffness or soreness?   No     Skin: Any concerning lesions or moles?   No     Nervous System: Any loss of strength or feeling, numbness or tingling, shaking, dizziness, or headache?  No     Mental Health: Any depression, anxiety or problems sleeping?     YES sometimes trouble with sleeping. Wakes up throughout the night alot    Cognition: Do you have any problems with your memory?   YES Forgetfullness at times    PHQ-2 Score:   PHQ-2 ( 1999 [employer]) 12/5/2019 5/22/2019   Q1: Little interest or pleasure in doing things 0 0   Q2: Feeling down, depressed or hopeless 0 0   PHQ-2 Score 0 0       PHQ-9 Score:   [facility] Follow-up to PHQ 1/22/2019   PHQ-9 9. Suicide Ideation past 2 weeks Several days   Thoughts of suicide or self harm in past 2 weeks No   PHQ-9 Safety concerns? No            Medical Care     What other specialists or organizations are involved in your medical care?  no  Patient Care Team       Relationship Specialty Notifications Start End    Queta Clark MD PCP - General Student in organized health care education/training program  7/10/19     Phone: [phone] Fax: [phone]         [address] [place]  "[id number]                 Social History / Home Safety     Social History     Tobacco Use     Smoking status: Never Smoker     Smokeless tobacco: Never Used   Substance Use Topics     Alcohol use: No     Marital Status:  Who lives in your household? 2    Does your home have any of the following safety concerns? Loose rugs in the hallway, no grab bars in the bathroom, no handrails on the stairs or have poorly lit areas?  No     Do you feel threatened or controlled by a partner, ex-partner or anyone in your life? No     Has anyone hurt you physically, for example by pushing, hitting, slapping or kicking you   or forcing you to have sex? No          Functional Status     Do you need help with dressing yourself, bathing, or walking?No     Do you need help with the phone, transportation, shopping, preparing meals, housework, laundry, medications or managing money?No       Risk Behaviors and Healthy Habits     History   Smoking Status     Never Smoker   Smokeless Tobacco     Never Used     How many servings of fruits and vegetables do you eat a day? 3-4 everymeal has vegetables and or fruits    Exercise: None No exercise     Do you frequently drive without a seatbelt? No     Do you use any other drugs? No         Do you use alcohol?No      Frailty Assessment            1. By yourself and note using aids, do you have difficulty walking up 10 steps without resting?  No  (1 for Yes, 0 for No)    2. By yourself and not using mobility aids, do you have any difficulty walking several hundred yards? No  (1 for Yes, 0 for No)    3. Have you lost 10 or more pounds unintentionally in the previous year? No  (If \"Yes\" and >5% weight loss, then score 1.  Score 0, if <5% weight loss or \"No\" weight loss)    4. How much of the times during the past 3 weeks did you feel tired? 5. None of the time (\"1\" or \"2\" are scored 1, others 0)    5.  A doctor told the patient they had the following illnesses:  None (0-4 = score 0, 5-11= score " 1)        Bharti Cruz, CMA

## 2019-12-05 NOTE — NURSING NOTE
Due to patient being non-English speaking/uses sign language, an  was used for this visit. Only for face-to-face interpretation by an external agency, date and length of interpretation can be found on the scanned worksheet.     name: Tanya Blood  Agency: [employer]  Language: Tunisian   Telephone number: [phone]  Type of interpretation: Face-to-face, spoken

## 2019-12-13 NOTE — RESULT ENCOUNTER NOTE
"Lennox Hay,    Please call patient with these results.     \"Peng Randle,     Just wanted to call to inform you that your A1C is 5.3%. Also, your wet prep was negative for any vaginal infection.    Please follow up as needed.    Thank you,  Dr. Clark\""

## 2020-01-13 ENCOUNTER — OFFICE VISIT (OUTPATIENT)
Dept: FAMILY MEDICINE | Facility: CLINIC | Age: 65
End: 2020-01-13
Payer: COMMERCIAL

## 2020-01-13 VITALS
HEART RATE: 74 BPM | OXYGEN SATURATION: 98 % | RESPIRATION RATE: 12 BRPM | TEMPERATURE: 97.6 F | SYSTOLIC BLOOD PRESSURE: 137 MMHG | WEIGHT: 134.4 LBS | DIASTOLIC BLOOD PRESSURE: 84 MMHG | HEIGHT: 61 IN | BODY MASS INDEX: 25.37 KG/M2

## 2020-01-13 DIAGNOSIS — I10 BENIGN ESSENTIAL HYPERTENSION: ICD-10-CM

## 2020-01-13 DIAGNOSIS — H00.015 HORDEOLUM EXTERNUM OF LEFT LOWER EYELID: Primary | ICD-10-CM

## 2020-01-13 LAB
CHOLEST SERPL-MCNC: 156.3 MG/DL (ref 0–200)
CHOLEST/HDLC SERPL: 3.3 {RATIO} (ref 0–5)
HDLC SERPL-MCNC: 48.1 MG/DL
LDLC SERPL CALC-MCNC: 82 MG/DL (ref 0–129)
TRIGL SERPL-MCNC: 130.4 MG/DL (ref 0–150)
VLDL CHOLESTEROL: 26.1 MG/DL (ref 7–32)

## 2020-01-13 RX ORDER — SIMVASTATIN 20 MG
20 TABLET ORAL AT BEDTIME
Qty: 90 TABLET | Refills: 3 | Status: SHIPPED | OUTPATIENT
Start: 2020-01-13 | End: 2020-12-07

## 2020-01-13 RX ORDER — LISINOPRIL 10 MG/1
10 TABLET ORAL DAILY
Qty: 90 TABLET | Refills: 3 | Status: SHIPPED | OUTPATIENT
Start: 2020-01-13 | End: 2020-11-23

## 2020-01-13 RX ORDER — LATANOPROST 50 UG/ML
1 SOLUTION/ DROPS OPHTHALMIC DAILY
COMMUNITY
End: 2021-09-28

## 2020-01-13 ASSESSMENT — MIFFLIN-ST. JEOR: SCORE: 1084.07

## 2020-01-13 NOTE — NURSING NOTE
Due to patient being non-English speaking/uses sign language, an  was used for this visit. Only for face-to-face interpretation by an external agency, date and length of interpretation can be found on the scanned worksheet.       name:  [employer]  Agency:  [employer]  Language:  Dutch  Telephone number:  [phone]  Type of interpretation:  Face-to-face, spoken

## 2020-01-13 NOTE — PATIENT INSTRUCTIONS
Keep up the good work!    -Keep warm compresses on your left eye to help your stye. Continue using eye drops (Can do twice a day for the next 7 days)    -Let's check your lipids and see how your cholesterol is doing.    -Refilled lisinopril and simvastatin today. You should be good for a year.    But, please come back and see me in 3 months for a blood pressure recheck.    Thank you for coming in today!    Thanks,    Dr. Clark

## 2020-01-13 NOTE — PROGRESS NOTES
ASSESSMENT AND PLAN     1. Hordeolum externum of left lower eyelid  Has history of glaucoma with cataracts. Does have one month of intermittent left eye irritation associated with irritation/itchiness. Does not appear to have dried crusting or yellow discharge. Sees ophthalmologist every 6 months and will have cataract surgery in the future. Exam consistent with a left lower eyelid hordeolum. Counseled patient on warm compresses and use of her eye drops (twice a day) for the next 7-10 days until symptoms are improved.   - Warm compresses  - Continue latanoprost eye drops.     2. Benign essential hypertension  Needs refills on lisinopril and simvastatin. /84 today which is well controlled. Will refill and check lipid panel and have patient return in three months for a blood pressure recheck.  - lisinopril (PRINIVIL/ZESTRIL) 10 MG tablet; Take 1 tablet (10 mg) by mouth daily  Dispense: 90 tablet; Refill: 3  - simvastatin (ZOCOR) 20 MG tablet; Take 1 tablet (20 mg) by mouth At Bedtime  Dispense: 90 tablet; Refill: 3  - Lipid Panel ([place])      RTC in 3 months for follow up of HTN or sooner if develops new or worsening symptoms.    The patient was seen by, and discussed with, Dr. Triston Jalloh, who agrees with the plan.    Queta Clark MD PGY2  [facility]         SUBJECTIVE       Razia Dos Santos is a 64 year old  female with a PMH significant for:     Patient Active Problem List   Diagnosis     Pure hypercholesterolemia     Insomnia     Nonspecific reaction to tuberculin skin test without active tuberculosis     Migraine     Abnormal Pap smear of cervix     Osteopenia     Glaucoma     Benign essential hypertension     Pre-diabetes     Chronic rhinitis     Breast cancer screening     She presents to clinic today to go over her lab results and discuss her left eye pain.     Vaginal irritation that she had been having is now resolved. She stopped using the triamcinolone cream. She is wondering if this  "could come back. Discussed with patient that if returns, can try the cream again. Denies vaginal bleeding, vaginal discharge, dysuria, hematuria, abdominal pain or fever/chills.     Has been having left eye pain around her eye for the past month that has been intermittent. Her eyes feel so heavy most of the time. She does have eye drops for glaucoma and uses them often. Does follow up regularly with an ophthalmologist. Uses one time a day and that helps improve the pain. Denies discharge or crusting from eye. Does not crust over and able to open eyes in the morning. Denies fever/chills, nasal congestion or recent illness.    PMH, Medications and Allergies were reviewed and updated as needed.        REVIEW OF SYSTEMS     ROS reviewed in HPI.         OBJECTIVE     Vitals:    01/13/20 0832   BP: 137/84   BP Location: Left arm   Patient Position: Sitting   Cuff Size: Adult Regular   Pulse: 74   Resp: 12   Temp: 97.6  F (36.4  C)   TempSrc: Oral   SpO2: 98%   Weight: 61 kg (134 lb 6.4 oz)   Height: 1.537 m (5' 0.5\")     Body mass index is 25.82 kg/m .    General: Alert, well appearing, no acute distress  Eyes: PERRL, EOMI, conjunctival erythema on left side with external hordeolum in medial corner of left eyelid.   HENT: Normocephalic, atraumatic; moist mucous membranes, no oropharyngeal erythema  Neck: No tenderness, no lymphadenopathy  Lungs: Clear to auscultation bilaterally, no wheezing, no rhonchi, no crackles  CV: Regular rate and rhythm, no murmur, no rubs, no gallops    No results found for this or any previous visit (from the past 24 hour(s)).        "

## 2020-01-13 NOTE — PROGRESS NOTES
Preceptor Attestation:   Patient seen, evaluated and discussed with the resident. I have verified the content of the note, which accurately reflects my assessment of the patient and the plan of care.   Supervising Physician:  Triston Jalloh MD.

## 2020-01-21 ENCOUNTER — OFFICE VISIT (OUTPATIENT)
Dept: FAMILY MEDICINE | Facility: CLINIC | Age: 65
End: 2020-01-21
Payer: COMMERCIAL

## 2020-01-21 VITALS
WEIGHT: 133.8 LBS | DIASTOLIC BLOOD PRESSURE: 85 MMHG | SYSTOLIC BLOOD PRESSURE: 130 MMHG | RESPIRATION RATE: 16 BRPM | TEMPERATURE: 97.9 F | OXYGEN SATURATION: 98 % | BODY MASS INDEX: 25.26 KG/M2 | HEART RATE: 70 BPM | HEIGHT: 61 IN

## 2020-01-21 DIAGNOSIS — R05.8 POST-VIRAL COUGH SYNDROME: ICD-10-CM

## 2020-01-21 DIAGNOSIS — M54.50 ACUTE MIDLINE LOW BACK PAIN WITHOUT SCIATICA: ICD-10-CM
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
## 2020-01-21 RX ORDER — ACETAMINOPHEN 500 MG
500-1000 TABLET ORAL EVERY 6 HOURS PRN
Qty: 60 TABLET | Refills: 0 | Status: SHIPPED | OUTPATIENT
Start: 2020-01-21 | End: 2021-03-11

## 2020-01-21 RX ORDER — GUAIFENESIN/DEXTROMETHORPHAN 100-10MG/5
5 SYRUP ORAL EVERY 4 HOURS PRN
Qty: 118 ML | Refills: 0 | Status: SHIPPED | OUTPATIENT
Start: 2020-01-21 | End: 2021-03-11

## 2020-01-21 ASSESSMENT — MIFFLIN-ST. JEOR: SCORE: 1083.41

## 2020-01-21 NOTE — PROGRESS NOTES
SUBJECTIVE       Razia Dos Santos is a 64 year old  female with a PMHx significant for   Patient Active Problem List   Diagnosis     Pure hypercholesterolemia     Insomnia     Nonspecific reaction to tuberculin skin test without active tuberculosis     Migraine     Abnormal Pap smear of cervix     Osteopenia     Glaucoma     Benign essential hypertension     Pre-diabetes     Chronic rhinitis     Breast cancer screening     Who presents today with a fall.    Razia had a fall 3 days ago. She slipped on ice outside falling backwards hitting her butt and her head. She did not lose consciousness. No symptoms prior to the fall. No bleeding form the back of the head. Since then, she has had pain over the center of her low back and bottom. No pain in the hips or legs. She had severe headache the day it happened which is improving and almost gone. No changes in vision or hearing. No lightheadedness or dizziness.  No muscle weakness or numbness/tingling. She has had some urinary incontinence with coughing which had been present before the fall. No incontinence of stool. No saddle anesthesia.  She has not tried anything for the pain.     She also has a cough and would like some cough syrup. No fever or chills. Not coughing anything up. She had a cold three weeks ago that has now resolved.     ROS: As stated in HPI.    Current medications include:   Current Outpatient Medications   Medication Sig Dispense Refill     benzocaine-resorcinol (VAGISIL) 5-2 % vaginal cream Place 1 Units vaginally 2 times daily as needed (vaginal itching) 60 g 1     calcium 600-200 MG-UNIT TABS Take  by mouth. Take 2 tablet daily       latanoprost (XALATAN) 0.005 % ophthalmic solution 1 drop daily       lisinopril (PRINIVIL/ZESTRIL) 10 MG tablet Take 1 tablet (10 mg) by mouth daily 90 tablet 3     Multiple Vitamin (MULTIVITAMIN  S) CAPS Take 1 capsule by mouth daily Take 1 tablet daily 90 capsule 0     simvastatin (ZOCOR) 20 MG tablet Take 1 tablet (20 mg)  "by mouth At Bedtime 90 tablet 3     vitamin D3 (CHOLECALCIFEROL) 1000 units (25 mcg) tablet Take 1 tablet (1,000 Units) by mouth daily 100 tablet 3       PMH, Medications and Allergies were reviewed and updated as needed.        OBJECTIVE     Vitals:    01/21/20 1026   BP: 130/85   BP Location: Left arm   Patient Position: Sitting   Pulse: 70   Resp: 16   Temp: 97.9  F (36.6  C)   TempSrc: Oral   SpO2: 98%   Weight: 60.7 kg (133 lb 12.8 oz)   Height: 1.54 m (5' 0.63\")     Body mass index is 25.59 kg/m .    Gen:  Sitting in exam chair, pleasant, NAD  HEENT: Normocephalic, atraumatic. PERRL. EOMI, no scleral icterus. TMs grey/translucent bilaterally. No bruising or hematoma on back of head.   Neck: Supple. No anterior cervical lymphadenopathy  CV:  RRR. No murmurs, rubs, or gallops. Good peripheral pulses  Pulm:  CTAB, no wheezes, rales, or rhonchi  ABD: Bowel sounds present. Abdomen soft and nontender throughout, no masses or rebound  Extrem: Warm and well perfused. Strength appears normal  Neuro: Alert, oriented to person, place, and time. PERRL, EOMI. Speech normal. Muscles of facial expression intact. Sensation over lower extremities normal and symmetric. Muscle strength with dorsiflexion, plantarflexion, leg extension, and leg flexion 5/5 and symmetric. Reflexes at patellar tendon 2+ and symmetric.   Back: She has tenderness over her sacral spine. No pain with palpation over her lateral hips.   Psych: Mood and affect appropriate    No results found for this or any previous visit (from the past 24 hour(s)).      ASSESSMENT AND PLAN     1. Fall, initial encounter  2. Acute midline low back pain without sciatica  Razia had a mechanical 3 days ago after slipping on the ice. She hit her butt and head and did not have LOC. She initially had a headache which is now almost gone. She has persistent back pain over her sacral spine.  No red flag symptoms. On exam her neuro exam was normal. Lumbar spine XR did not show any " obvious fracture, awaiting formal radiology read. We discussed conservative measures with tylenol and rest. Heat/ice as needed. Discussed return precautions and recommended follow up in 4 weeks.   - acetaminophen (TYLENOL) 500 MG tablet; Take 1-2 tablets (500-1,000 mg) by mouth every 6 hours as needed for mild pain  Dispense: 60 tablet; Refill: 0  - XR Lumbar Spine 2-3 Views*    3. Post-viral cough syndrome  She has had 3 weeks of dry cough without fevers/chills following a cold. No signs/symptoms of pneumonia. Robitussin per her request.   - guaiFENesin-dextromethorphan (ROBITUSSIN DM) 100-10 MG/5ML syrup; Take 5 mLs by mouth every 4 hours as needed for cough  Dispense: 118 mL; Refill: 0    RTC in 4 weeks for follow up of back pain or sooner if develops new or worsening symptoms. Return precautions discussed.     Discussed with MD Parminder Avendaño, PGY3  [facility]

## 2020-01-21 NOTE — NURSING NOTE
Due to patient being non-English speaking/uses sign language, an  was used for this visit. Only for face-to-face interpretation by an external agency, date and length of interpretation can be found on the scanned worksheet.     name: Dorinda Main  Agency: [employer]  Language: Slovenian   Telephone number: [phone]  Type of interpretation: Face-to-face, spoken

## 2020-01-21 NOTE — PROGRESS NOTES
Preceptor Attestation:   Patient seen, evaluated and discussed with the resident. I have verified the content of the note, which accurately reflects my assessment of the patient and the plan of care.   Supervising Physician:  Paolo Hayes MD

## 2020-01-21 NOTE — PROGRESS NOTES
Preceptor Attestation:   Patient seen, evaluated and discussed with the resident. I personally reviewed the imaging and agree with the interpretation documented by the resident. I have verified the content of the note, which accurately reflects my assessment of the patient and the plan of care.   Supervising Physician:  Paolo Hayes MD.

## 2020-04-05 DIAGNOSIS — E55.9 VITAMIN D DEFICIENCY: ICD-10-CM

## 2020-04-06 RX ORDER — CHOLECALCIFEROL (VITAMIN D3) 25 MCG
TABLET ORAL
Qty: 100 TABLET | Refills: 3 | Status: SHIPPED | OUTPATIENT
Start: 2020-04-06 | End: 2022-01-05

## 2020-08-06 ENCOUNTER — OFFICE VISIT (OUTPATIENT)
Dept: FAMILY MEDICINE | Facility: CLINIC | Age: 65
End: 2020-08-06

## 2020-08-06 ENCOUNTER — RECORDS - HEALTHEAST (OUTPATIENT)
Dept: ADMINISTRATIVE | Facility: OTHER | Age: 65
End: 2020-08-06

## 2020-08-06 ENCOUNTER — AMBULATORY - HEALTHEAST (OUTPATIENT)
Dept: SCHEDULING | Facility: CLINIC | Age: 65
End: 2020-08-06

## 2020-08-06 VITALS
RESPIRATION RATE: 16 BRPM | TEMPERATURE: 98.6 F | SYSTOLIC BLOOD PRESSURE: 149 MMHG | DIASTOLIC BLOOD PRESSURE: 85 MMHG | HEART RATE: 62 BPM | BODY MASS INDEX: 26.7 KG/M2 | WEIGHT: 139.6 LBS

## 2020-08-06 DIAGNOSIS — Z23 NEED FOR VACCINATION: ICD-10-CM

## 2020-08-06 DIAGNOSIS — Z78.0 POST-MENOPAUSAL: ICD-10-CM

## 2020-08-06 DIAGNOSIS — Z13.820 SCREENING FOR OSTEOPOROSIS: ICD-10-CM

## 2020-08-06 DIAGNOSIS — I10 BENIGN ESSENTIAL HYPERTENSION: Primary | Chronic | ICD-10-CM

## 2020-08-06 DIAGNOSIS — G89.29 CHRONIC LEFT SHOULDER PAIN: ICD-10-CM

## 2020-08-06 DIAGNOSIS — Z13.820 OSTEOPOROSIS SCREENING: ICD-10-CM

## 2020-08-06 DIAGNOSIS — R42 VERTIGO: ICD-10-CM

## 2020-08-06 DIAGNOSIS — R53.83 FATIGUE, UNSPECIFIED TYPE: ICD-10-CM

## 2020-08-06 DIAGNOSIS — M25.512 CHRONIC LEFT SHOULDER PAIN: ICD-10-CM

## 2020-08-06 LAB
BUN SERPL-MCNC: 12 MG/DL (ref 7–19)
CALCIUM SERPL-MCNC: 9.9 MG/DL (ref 8.5–10.1)
CHLORIDE SERPLBLD-SCNC: 105.7 MMOL/L (ref 98–110)
CO2 SERPL-SCNC: 25.1 MMOL/L (ref 20–32)
CREAT SERPL-MCNC: 0.8 MG/DL (ref 0.5–1)
GFR SERPL CREATININE-BSD FRML MDRD: 80.3 ML/MIN/1.7 M2
GLUCOSE SERPL-MCNC: 107.7 MG'DL (ref 70–99)
HCT VFR BLD AUTO: 45.3 % (ref 35–47)
HEMOGLOBIN: 13.6 G/DL (ref 11.7–15.7)
MCH RBC QN AUTO: 29.4 PG (ref 26.5–35)
MCHC RBC AUTO-ENTMCNC: 30 G/DL (ref 32–36)
MCV RBC AUTO: 97.8 FL (ref 78–100)
PLATELET # BLD AUTO: 233 K/UL (ref 150–450)
POTASSIUM SERPL-SCNC: 4.3 MMOL/L (ref 3.2–4.6)
RBC # BLD AUTO: 4.6 M/UL (ref 3.8–5.2)
SODIUM SERPL-SCNC: 143 MMOL/L (ref 132–142)
TSH SERPL DL<=0.05 MIU/L-ACNC: 1.59 UIU/ML (ref 0.3–5)
WBC # BLD AUTO: 5 K/UL (ref 4–11)

## 2020-08-06 RX ORDER — LISINOPRIL 5 MG/1
5 TABLET ORAL DAILY
Qty: 60 TABLET | Refills: 3 | Status: SHIPPED | OUTPATIENT
Start: 2020-08-06 | End: 2021-02-22

## 2020-08-06 RX ORDER — NAPROXEN 500 MG/1
500 TABLET ORAL 2 TIMES DAILY WITH MEALS
Qty: 60 TABLET | Refills: 0 | Status: SHIPPED | OUTPATIENT
Start: 2020-08-06 | End: 2020-09-21

## 2020-08-06 NOTE — NURSING NOTE
Due to patient being non-English speaking/uses sign language, an  was used for this visit. Only for face-to-face interpretation by an external agency, date and length of interpretation can be found on the scanned worksheet.     name: Nick Barajas  Agency: [employer]  Language: Divehi   Telephone number: [phone]  Type of interpretation: Telephone, spoken

## 2020-08-06 NOTE — PROGRESS NOTES
Preceptor Attestation:   Patient seen, evaluated and discussed with the resident. I have verified the content of the note, which accurately reflects my assessment of the patient and the plan of care.   Supervising Physician:  Marisol Mohan MD

## 2020-08-06 NOTE — PROGRESS NOTES
"  ASSESSMENT AND PLAN     1. Benign essential hypertension  /85 on more than one occasion here in clinic. Per patient, has been this elevated in the past. She would be open to increasing her blood pressure medication, however, only just slightly. Through patient centered discussion, patient would like to try 15mg of lisinopril daily to see if there is any improvement. Also, she is ready to exercise more and eat healthier. Discussed that she can take one 10mg pill and one 5mg pill that was prescribed today. She was able to teach this back to me. Counseled on signs of hypotension to watch out for and stop taking 5mg pill if feels lightheaded or presyncope. She will follow up in one month to check her blood pressure. Also, given at home blood pressure cuff to take home from clinic.   - lisinopril (ZESTRIL) 5 MG tablet; Take 1 tablet (5 mg) by mouth daily  Dispense: 60 tablet; Refill: 3    2. Vertigo  Has been having episodes of what seems to be vertigo. Unclear if position makes the vertigo worse. She feels like the \"room is spinning\" around her. Neurological exam unremarkable today. Does not have recurring episodes at this time. Likely, peripheral and discussed that she should come in if it happens again.     3. Screening for osteoporosis  - Dexa hip/pelvis/spine*; Future    4. Fatigue, unspecified type  Has been feeling increased fatigue over the last several months. She has not been exercising as much as she did before COVID-19. Discussed that we can check some basic lab work to evaluate for possible vitamin deficiency, anemia, electrolyte disturbance or thyroid disease. Discussed importance of lifestyle changes and eating healthy and exercise.   - Vitamin D 25-Hydroxy ([facility])  - CBC with Plt ([facility])  - Basic Metabolic Panel ([facility])  - Results < 1 hr  - Thyroid [place] ([facility])    5. Chronic left shoulder pain  Ongoing for years. No worsening of symptoms. Shown stretches. Exam unremarkable. Given " naproxen.   - naproxen (NAPROSYN) 500 MG tablet; Take 1 tablet (500 mg) by mouth 2 times daily (with meals)  Dispense: 60 tablet; Refill: 0    6. Need for vaccination  - Pneumococcal vaccine 23 valent PPSV23  (Pneumovax) [96086]    RTC in one month for follow up of HTN/Fatigue or sooner if develops new or worsening symptoms.    The patient was seen by, and discussed with, Dr. Marisol Mohan, who agrees with the plan.    Queta Clark MD PGY3  [facility]         SUBJECTIVE       Razia Dos Santos is a 64 year old  female with a PMH significant for:     Patient Active Problem List   Diagnosis     Pure hypercholesterolemia     Insomnia     Nonspecific reaction to tuberculin skin test without active tuberculosis     Migraine     Abnormal Pap smear of cervix     Osteopenia     Glaucoma     Benign essential hypertension     Pre-diabetes     Chronic rhinitis     Breast cancer screening     She presents for a follow up for high blood pressure, and would also like to discuss a few other things.    She reports that she was here in January and was told to follow up in 3 months, however, because of COVID-19, was not able to.     Dizziness: She reports that she has been feeling some dizziness. She reports that it comes and goes, longest time it lasts was a half day. She reports that it does not matter whether she is sitting down or laying down. Unknown if motion makes it worse. Last time happened was one month ago. Denies vision changes, headaches, chest pain, numbness, tingling, weakness, nausea, vomiting.     Fatigue: Recently, has been feeling really tired. Has to rest a lot more than she used to have to. She feels that her sleep and mood are okay. Gets about 7 hours of sleep per night. Appetite unchanged.    Blood pressure: Does not check at home. Denies headache, vision changes, chest pain, shortness of breath, diaphoresis, nausea/vomiting.    Left shoulder pain: Chronic. Has been bothering her a bit more lately.      PMH, Medications and Allergies were reviewed and updated as needed.        REVIEW OF SYSTEMS     ROS reviewed in HPI.         OBJECTIVE     Vitals:    08/06/20 0938 08/06/20 0941   BP: (!) 149/97 (!) 149/85   Pulse: 66 62   Resp: 16    Temp: 98.6  F (37  C)    Weight: 63.3 kg (139 lb 9.6 oz)      Body mass index is 26.7 kg/m .    General: Alert, well appearing, no acute distress  Eyes: PERRL, EOMI, normal conjunctiva  HENT: Normocephalic, atraumatic; moist mucous membranes, no oropharyngeal erythema  Neck: No tenderness, no lymphadenopathy  Lungs: Clear to auscultation bilaterally, no wheezing, no rhonchi, no crackles  CV: Regular rate and rhythm, no murmur, no rubs, no gallops  Abdomen: Soft, nontender, non-distended, no masses palpated, normal bowel sounds  Extremities: Able to move all extremities, nontender, normal strength  Neuro: CN II-XII intact, no focal neurological deficits.   Skin: Dry, no cyanosis, no abrasions, no discoloration.    No results found for this or any previous visit (from the past 24 hour(s)).

## 2020-08-06 NOTE — PATIENT INSTRUCTIONS
1. Benign essential hypertension  Start taking one 10mg pill and one 5mg pill so that the total per day is 15mg. Stop taking 5mg pill if feel lightheaded.   - lisinopril (ZESTRIL) 5 MG tablet; Take 1 tablet (5 mg) by mouth daily  Dispense: 60 tablet; Refill: 3    2. Vertigo  We will watch and observe to see if continues to happen. Likely, secondary to age and positional changes. Benign at this time.     3. Screening for osteoporosis  Scheduling will call you to set this up.   - Dexa hip/pelvis/spine*; Future    4. Fatigue, unspecified type  Please go down to lab. Will call if need to discuss more. Also, try to exercise 30 minutes a day with a walk and eat lots of vegetables.   - Vitamin D 25-Hydroxy ([facility])  - CBC with Plt ([facility])  - Basic Metabolic Panel ([facility])  - Results < 1 hr  - Thyroid [place] ([facility])    5. Chronic left shoulder pain  Take one pill with meals twice a day. Also, continue to do stretches.   - naproxen (NAPROSYN) 500 MG tablet; Take 1 tablet (500 mg) by mouth 2 times daily (with meals)  Dispense: 60 tablet; Refill: 0    Please see me back in one month!  Thank you for coming in!  -Dr. Clark    20   DEXA  [facility]   [phone]  Fax Orders to [phone]     Order faxed to [phone], they will contact patient to schedule.     Ruby Carpenter

## 2020-08-06 NOTE — LETTER
"August 11, 2020      Razia Rosario [address] [place] [id number]        Dear ,    We are writing to inform you of your test results.    Just wanted to follow up on the labs that were drawn in clinic last week. Looks like there was nothing that is concerning to cause your increased tiredness right now. You can start taking a Centrum Silver multivitamin to make sure you are receiving all of your nutrients. Hope you are feeling better! See you back in clinic soon!   Thank you,   Dr. Clark\"     Resulted Orders   Vitamin D 25-Hydroxy ([facility])   Result Value Ref Range    Vitamin D,25-Hydroxy 29.1 (L) 30.0 - 80.0 ng/mL    Narrative    Test performed by:  [facility]  [address] [place] [address]  Deficiency <10.0 ng/mL  Insufficiency 10.0-29.9 ng/mL  Sufficiency 30.0-80.0 ng/mL  Toxicity (possible) >100.0 ng/mL   CBC with Plt ([facility])   Result Value Ref Range    WBC 5.0 4.0 - 11.0 K/uL    RBC 4.6 3.8 - 5.2 M/uL    Hemoglobin 13.6 11.7 - 15.7 g/dL    Hematocrit 45.3 35.0 - 47.0 %    MCV 97.8 78.0 - 100.0 fL    MCH 29.4 26.5 - 35.0    MCHC 30.0 (L) 32.0 - 36.0 g/dL    Platelets 233.0 150.0 - 450.0 K/uL   Basic Metabolic Panel ([facility])  - Results < 1 hr   Result Value Ref Range    Urea Nitrogen 12.0 7.0 - 19.0 mg/dL    Calcium 9.9 8.5 - 10.1 mg/dL    Chloride 105.7 98.0 - 110.0 mmol/L    Carbon Dioxide 25.1 20.0 - 32.0 mmol/L    Creatinine 0.8 0.5 - 1.0 mg/dL    Glucose 107.7 (H) 70.0 - 99.0 mg'dL    Potassium 4.3 3.2 - 4.6 mmol/L    Sodium 143.0 (H) 132.0 - 142.0 mmol/L    GFR Estimate 80.3 >60.0 mL/min/1.7 m2    GFR Estimate If Black >90 >60.0 mL/min/1.7 m2   Thyroid [place] ([facility])   Result Value Ref Range    TSH 1.59 0.30 - 5.00 uIU/mL    Narrative    Test performed by:  [facility]  [address] [place] [address]       If you have any questions or concerns, please call the clinic at the number listed above.       Sincerely,        Queta Clark MD                "

## 2020-08-07 LAB — 25(OH)D3 SERPL-MCNC: 29.1 NG/ML (ref 30–80)

## 2020-08-11 NOTE — RESULT ENCOUNTER NOTE
"Peng Hay,    Please have [place]  call and relay this message about Razia's results.    \"Peng Randle,  Just wanted to follow up on the labs that were drawn in clinic last week. Looks like there was nothing that is concerning to cause your increased tiredness right now. You can start taking a Centrum Silver multivitamin to make sure you are receiving all of your nutrients. Hope you are feeling better! See you back in clinic soon!  Thank you,  Dr. Clark\""

## 2020-08-17 ENCOUNTER — TRANSFERRED RECORDS (OUTPATIENT)
Dept: HEALTH INFORMATION MANAGEMENT | Facility: CLINIC | Age: 65
End: 2020-08-17

## 2020-08-21 ENCOUNTER — HOSPITAL ENCOUNTER (OUTPATIENT)
Dept: MAMMOGRAPHY | Facility: CLINIC | Age: 65
Discharge: HOME | End: 2020-08-21

## 2020-08-21 DIAGNOSIS — Z12.31 VISIT FOR SCREENING MAMMOGRAM: ICD-10-CM

## 2020-09-10 DIAGNOSIS — M25.512 CHRONIC LEFT SHOULDER PAIN: ICD-10-CM

## 2020-09-10 DIAGNOSIS — G89.29 CHRONIC LEFT SHOULDER PAIN: ICD-10-CM

## 2020-09-11 ENCOUNTER — OFFICE VISIT (OUTPATIENT)
Dept: FAMILY MEDICINE | Facility: CLINIC | Age: 65
End: 2020-09-11

## 2020-09-11 VITALS
BODY MASS INDEX: 28.16 KG/M2 | TEMPERATURE: 97.7 F | HEIGHT: 60 IN | HEART RATE: 85 BPM | WEIGHT: 143.4 LBS | OXYGEN SATURATION: 100 % | SYSTOLIC BLOOD PRESSURE: 127 MMHG | DIASTOLIC BLOOD PRESSURE: 84 MMHG | RESPIRATION RATE: 16 BRPM

## 2020-09-11 DIAGNOSIS — Z23 NEED FOR PROPHYLACTIC VACCINATION AND INOCULATION AGAINST INFLUENZA: ICD-10-CM

## 2020-09-11 DIAGNOSIS — I10 BENIGN ESSENTIAL HYPERTENSION: Primary | Chronic | ICD-10-CM

## 2020-09-11 ASSESSMENT — MIFFLIN-ST. JEOR: SCORE: 1124.45

## 2020-09-11 NOTE — PATIENT INSTRUCTIONS
Thanks for coming in today!    Your blood pressure is well controlled on the 5mg of lisinopril.     Continue to take ADVIL as needed for your shoulder pain. Glad it is getting better!    Follow up in with me in 6 months!    Thanks,     Dr. Clark

## 2020-09-11 NOTE — NURSING NOTE
Due to patient being non-English speaking/uses sign language, an  was used for this visit. Only for face-to-face interpretation by an external agency, date and length of interpretation can be found on the scanned worksheet.     name: Mara Barajas  Agency: [employer]  Language: French   Telephone number:   Type of interpretation: Telephone, spoken

## 2020-09-11 NOTE — PROGRESS NOTES
"  ASSESSMENT AND PLAN     1. Benign essential hypertension  Recently started on lisinopril 5mg daily at previous visit on 8/6. She has been asymptomatic. Vitals show improvement of blood pressures today.   - Continue lisinopril 5mg daily    Influenza vaccine administered today.       RTC in 6 months for follow up of blood pressure or sooner if develops new or worsening symptoms.    The patient was seen by, and discussed with, Dr. Marisol Mohan, who agrees with the plan.    Queta Clark MD PGY3  [facility]         SUBJECTIVE       Razia Dos Santos is a 64 year old  female with a PMH significant for:     Patient Active Problem List   Diagnosis     Pure hypercholesterolemia     Insomnia     Nonspecific reaction to tuberculin skin test without active tuberculosis     Migraine     Abnormal Pap smear of cervix     Osteopenia     Glaucoma     Benign essential hypertension     Pre-diabetes     Chronic rhinitis     Breast cancer screening     She presents for a follow up regarding her blood pressure.     She recently started on lisinopril a few weeks ago. She has been feeling overall well. Denies headache, shortness of breath, chest pain, palpitations, lightheaded dizziness, numbness/tingling/weakness. Has been trying to exercise more and drinking more water. Diet consists of chicken, rice and vegetables, no recent changes. Feels that she has gained a little bit of weight.     PMH, Medications and Allergies were reviewed and updated as needed.        REVIEW OF SYSTEMS     ROS reviewed in HPI.         OBJECTIVE     Vitals:    09/11/20 1456   BP: 127/84   BP Location: Left arm   Patient Position: Sitting   Cuff Size: Adult Regular   Pulse: 85   Resp: 16   Temp: 97.7  F (36.5  C)   TempSrc: Oral   SpO2: 100%   Weight: 65 kg (143 lb 6.4 oz)   Height: 1.536 m (5' 0.47\")     Body mass index is 27.57 kg/m .    General: Alert, well appearing, no acute distress  Eyes: PERRL, EOMI, normal conjunctiva  HENT: Normocephalic, " atraumatic; moist mucous membranes, no oropharyngeal erythema  Lungs: Clear to auscultation bilaterally, no wheezing, no rhonchi, no crackles  CV: Regular rate and rhythm, no murmur, no rubs, no gallops  Extremities: Able to move all extremities, nontender, normal strength  Neuro: Grossly normal  Skin: Dry, no cyanosis, no abrasions, no discoloration.    No results found for this or any previous visit (from the past 24 hour(s)).

## 2020-09-21 RX ORDER — NAPROXEN 500 MG/1
TABLET ORAL
Qty: 60 TABLET | Refills: 0 | Status: SHIPPED | OUTPATIENT
Start: 2020-09-21 | End: 2021-06-28

## 2020-11-19 DIAGNOSIS — I10 BENIGN ESSENTIAL HYPERTENSION: ICD-10-CM

## 2020-11-23 RX ORDER — LISINOPRIL 10 MG/1
10 TABLET ORAL DAILY
Qty: 90 TABLET | Refills: 3 | Status: SHIPPED | OUTPATIENT
Start: 2020-11-23 | End: 2021-02-22

## 2020-12-07 DIAGNOSIS — I10 BENIGN ESSENTIAL HYPERTENSION: ICD-10-CM

## 2020-12-07 RX ORDER — SIMVASTATIN 20 MG
TABLET ORAL
Qty: 90 TABLET | Refills: 3 | Status: SHIPPED | OUTPATIENT
Start: 2020-12-07 | End: 2021-06-28

## 2021-01-07 ENCOUNTER — OFFICE VISIT (OUTPATIENT)
Dept: FAMILY MEDICINE | Facility: CLINIC | Age: 66
End: 2021-01-07
Payer: COMMERCIAL

## 2021-01-07 VITALS
BODY MASS INDEX: 26.58 KG/M2 | WEIGHT: 140.8 LBS | HEIGHT: 61 IN | OXYGEN SATURATION: 97 % | DIASTOLIC BLOOD PRESSURE: 86 MMHG | SYSTOLIC BLOOD PRESSURE: 151 MMHG | TEMPERATURE: 98.3 F | HEART RATE: 96 BPM | RESPIRATION RATE: 16 BRPM

## 2021-01-07 DIAGNOSIS — M25.512 CHRONIC LEFT SHOULDER PAIN: ICD-10-CM

## 2021-01-07 DIAGNOSIS — G89.29 CHRONIC LEFT SHOULDER PAIN: ICD-10-CM

## 2021-01-07 DIAGNOSIS — I10 ESSENTIAL HYPERTENSION: ICD-10-CM

## 2021-01-07 DIAGNOSIS — M75.02 ADHESIVE CAPSULITIS OF LEFT SHOULDER: Primary | ICD-10-CM

## 2021-01-07 PROCEDURE — 99213 OFFICE O/P EST LOW 20 MIN: CPT | Mod: GC | Performed by: STUDENT IN AN ORGANIZED HEALTH CARE EDUCATION/TRAINING PROGRAM

## 2021-01-07 ASSESSMENT — MIFFLIN-ST. JEOR: SCORE: 1112.07

## 2021-01-07 NOTE — PROGRESS NOTES
ASSESSMENT AND PLAN     1. Adhesive capsulitis of left shoulder  4-5 months of left shoulder pain specifically at the glenohumeral joint. Unable to lift arm above shoulder. Has tried NSAIDs with no relief. No trauma. Discussed with patient that likely has beginnings of adhesive capsulitis of the left shoulder. Discussed that should try physical therapy first to help with stretching and relieve pain in the shoulder. If still having ongoing pain, should return to clinic. Next steps may include steroid injection in the future to see if relief.  - PHYSICAL THERAPY REFERRAL; Future      2. Essential hypertension  BP elevated in clinic today. Patient reports that she has been taking her daily lisinopril. She states her at home BP cuff has been showing her blood pressure to be in SBP 130s for the past few weeks. Discussed with patient that we should check this again in one month.     RTC in one month for follow up of blood pressure or sooner if develops new or worsening symptoms.    The patient was seen by, and discussed with, Dr. Darin Long, who agrees with the plan.    Queta Clark MD PGY3  [facility]         SUBJECTIVE       Razia Dos Santos is a 65 year old  female with a PMH significant for:     Patient Active Problem List   Diagnosis     Pure hypercholesterolemia     Insomnia     Nonspecific reaction to tuberculin skin test without active tuberculosis     Migraine     Abnormal Pap smear of cervix     Osteopenia     Glaucoma     Benign essential hypertension     Pre-diabetes     Chronic rhinitis     Breast cancer screening     She presents with left shoulder pain.    Has been having left shoulder pain for the past 4-5 months. The right shoulder has been having pain for the past 2 weeks. Left shoulder pain radiates down her left arm. Pain is exacerbated when tries to lift up her arm above her shoulder, tying her shoe and when she tries to reach her back. Has tried naproxen which has not been helping. Switched  "to advil and the pain was relieved a little bit. In the morning, does light exercises. Unable to lift her arm very high. Denies fever/chills, chest pain, shortness of breath. When does not take Advil, gets very uncomfortable.     PMH, Medications and Allergies were reviewed and updated as needed.        REVIEW OF SYSTEMS     ROS reviewed in HPI.         OBJECTIVE     Vitals:    01/07/21 1428 01/07/21 1432   BP: (!) 166/97 (!) 151/86   BP Location: Right arm Right arm   Patient Position: Sitting Sitting   Cuff Size: Adult Regular Adult Regular   Pulse: 96    Resp: 16    Temp: 98.3  F (36.8  C)    TempSrc: Oral    SpO2: 97%    Weight: 63.9 kg (140 lb 12.8 oz)    Height: 1.543 m (5' 0.75\")      Body mass index is 26.82 kg/m .    General: Alert, well appearing, no acute distress  Lungs: Clear to auscultation bilaterally, no wheezing, no rhonchi, no crackles  CV: Regular rate and rhythm, no murmur, no rubs, no gallops  Extremities: Limited range of motion of left shoulder, unable to raise left arm above head. Tenderness to glenohumeral region, no warmth, no mass, no erythema. Decreased strength of left upper extremity on empty can test.   Skin: Dry, no cyanosis, no abrasions, no discoloration.    No results found for this or any previous visit (from the past 24 hour(s)).        "

## 2021-01-07 NOTE — PATIENT INSTRUCTIONS
-Continue to take Advil as needed for pain  -Schedule PT sessions.    If continue to have pain, please return to clinic in 4-6 weeks.    Should also follow up for your blood pressure in one month. Since you do not have symptoms and at home readings have been under control, let's check one more time before increasing medication.     Take care,    Dr. Clark

## 2021-01-07 NOTE — NURSING NOTE
Due to patient being non-English speaking/uses sign language, an  was used for this visit. Only for face-to-face interpretation by an external agency, date and length of interpretation can be found on the scanned worksheet.     name: ([id number])  Agency: [employer] - telephone  Language: Lynnette   Telephone number: [phone]  Type of interpretation: Telephone, spoken

## 2021-01-07 NOTE — PROGRESS NOTES
Preceptor Attestation:    Patient seen and evaluated in person. I discussed the patient with the resident. I have verified the content of the note, which accurately reflects my assessment of the patient and the plan of care.   Supervising Physician:  Darin Long MD.

## 2021-01-08 ENCOUNTER — AMBULATORY - HEALTHEAST (OUTPATIENT)
Dept: ADMINISTRATIVE | Facility: REHABILITATION | Age: 66
End: 2021-01-08

## 2021-01-08 DIAGNOSIS — M75.02 ADHESIVE CAPSULITIS OF LEFT SHOULDER: ICD-10-CM

## 2021-01-20 ENCOUNTER — OFFICE VISIT - HEALTHEAST (OUTPATIENT)
Dept: PHYSICAL THERAPY | Facility: REHABILITATION | Age: 66
End: 2021-01-20

## 2021-01-20 DIAGNOSIS — M25.612 SHOULDER JOINT STIFFNESS, BILATERAL: ICD-10-CM

## 2021-01-20 DIAGNOSIS — M25.611 SHOULDER JOINT STIFFNESS, BILATERAL: ICD-10-CM

## 2021-01-20 DIAGNOSIS — M75.02 ADHESIVE CAPSULITIS OF LEFT SHOULDER: ICD-10-CM

## 2021-01-20 DIAGNOSIS — M25.511 PAIN OF BOTH SHOULDER JOINTS: ICD-10-CM

## 2021-01-20 DIAGNOSIS — M25.512 PAIN OF BOTH SHOULDER JOINTS: ICD-10-CM

## 2021-01-25 ENCOUNTER — OFFICE VISIT - HEALTHEAST (OUTPATIENT)
Dept: PHYSICAL THERAPY | Facility: REHABILITATION | Age: 66
End: 2021-01-25

## 2021-01-25 DIAGNOSIS — M75.02 ADHESIVE CAPSULITIS OF LEFT SHOULDER: ICD-10-CM

## 2021-01-25 DIAGNOSIS — M25.512 PAIN OF BOTH SHOULDER JOINTS: ICD-10-CM

## 2021-01-25 DIAGNOSIS — M25.611 SHOULDER JOINT STIFFNESS, BILATERAL: ICD-10-CM

## 2021-01-25 DIAGNOSIS — M25.511 PAIN OF BOTH SHOULDER JOINTS: ICD-10-CM

## 2021-01-25 DIAGNOSIS — M25.612 SHOULDER JOINT STIFFNESS, BILATERAL: ICD-10-CM

## 2021-01-27 ENCOUNTER — OFFICE VISIT - HEALTHEAST (OUTPATIENT)
Dept: PHYSICAL THERAPY | Facility: REHABILITATION | Age: 66
End: 2021-01-27

## 2021-01-27 DIAGNOSIS — M25.511 PAIN OF BOTH SHOULDER JOINTS: ICD-10-CM

## 2021-01-27 DIAGNOSIS — M75.02 ADHESIVE CAPSULITIS OF LEFT SHOULDER: ICD-10-CM

## 2021-01-27 DIAGNOSIS — M25.612 SHOULDER JOINT STIFFNESS, BILATERAL: ICD-10-CM

## 2021-01-27 DIAGNOSIS — M25.611 SHOULDER JOINT STIFFNESS, BILATERAL: ICD-10-CM

## 2021-01-27 DIAGNOSIS — M25.512 PAIN OF BOTH SHOULDER JOINTS: ICD-10-CM

## 2021-02-02 ENCOUNTER — OFFICE VISIT - HEALTHEAST (OUTPATIENT)
Dept: PHYSICAL THERAPY | Facility: REHABILITATION | Age: 66
End: 2021-02-02

## 2021-02-02 DIAGNOSIS — M25.511 PAIN OF BOTH SHOULDER JOINTS: ICD-10-CM

## 2021-02-02 DIAGNOSIS — M25.512 PAIN OF BOTH SHOULDER JOINTS: ICD-10-CM

## 2021-02-02 DIAGNOSIS — M25.612 SHOULDER JOINT STIFFNESS, BILATERAL: ICD-10-CM

## 2021-02-02 DIAGNOSIS — M75.02 ADHESIVE CAPSULITIS OF LEFT SHOULDER: ICD-10-CM

## 2021-02-02 DIAGNOSIS — M25.611 SHOULDER JOINT STIFFNESS, BILATERAL: ICD-10-CM

## 2021-02-04 ENCOUNTER — OFFICE VISIT - HEALTHEAST (OUTPATIENT)
Dept: PHYSICAL THERAPY | Facility: REHABILITATION | Age: 66
End: 2021-02-04

## 2021-02-04 DIAGNOSIS — M25.612 SHOULDER JOINT STIFFNESS, BILATERAL: ICD-10-CM

## 2021-02-04 DIAGNOSIS — M25.512 PAIN OF BOTH SHOULDER JOINTS: ICD-10-CM

## 2021-02-04 DIAGNOSIS — M75.02 ADHESIVE CAPSULITIS OF LEFT SHOULDER: ICD-10-CM

## 2021-02-04 DIAGNOSIS — M25.611 SHOULDER JOINT STIFFNESS, BILATERAL: ICD-10-CM

## 2021-02-04 DIAGNOSIS — M25.511 PAIN OF BOTH SHOULDER JOINTS: ICD-10-CM

## 2021-02-09 ENCOUNTER — OFFICE VISIT - HEALTHEAST (OUTPATIENT)
Dept: PHYSICAL THERAPY | Facility: REHABILITATION | Age: 66
End: 2021-02-09

## 2021-02-09 DIAGNOSIS — M75.02 ADHESIVE CAPSULITIS OF LEFT SHOULDER: ICD-10-CM

## 2021-02-09 DIAGNOSIS — M25.611 SHOULDER JOINT STIFFNESS, BILATERAL: ICD-10-CM

## 2021-02-09 DIAGNOSIS — M25.511 PAIN OF BOTH SHOULDER JOINTS: ICD-10-CM

## 2021-02-09 DIAGNOSIS — M25.512 PAIN OF BOTH SHOULDER JOINTS: ICD-10-CM

## 2021-02-09 DIAGNOSIS — M25.612 SHOULDER JOINT STIFFNESS, BILATERAL: ICD-10-CM

## 2021-02-11 ENCOUNTER — OFFICE VISIT - HEALTHEAST (OUTPATIENT)
Dept: PHYSICAL THERAPY | Facility: REHABILITATION | Age: 66
End: 2021-02-11

## 2021-02-11 DIAGNOSIS — M25.512 PAIN OF BOTH SHOULDER JOINTS: ICD-10-CM

## 2021-02-11 DIAGNOSIS — M25.511 PAIN OF BOTH SHOULDER JOINTS: ICD-10-CM

## 2021-02-11 DIAGNOSIS — M25.612 SHOULDER JOINT STIFFNESS, BILATERAL: ICD-10-CM

## 2021-02-11 DIAGNOSIS — M75.02 ADHESIVE CAPSULITIS OF LEFT SHOULDER: ICD-10-CM

## 2021-02-11 DIAGNOSIS — M25.611 SHOULDER JOINT STIFFNESS, BILATERAL: ICD-10-CM

## 2021-02-16 ENCOUNTER — OFFICE VISIT - HEALTHEAST (OUTPATIENT)
Dept: PHYSICAL THERAPY | Facility: REHABILITATION | Age: 66
End: 2021-02-16

## 2021-02-16 DIAGNOSIS — M25.612 SHOULDER JOINT STIFFNESS, BILATERAL: ICD-10-CM

## 2021-02-16 DIAGNOSIS — M75.02 ADHESIVE CAPSULITIS OF LEFT SHOULDER: ICD-10-CM

## 2021-02-16 DIAGNOSIS — M25.611 SHOULDER JOINT STIFFNESS, BILATERAL: ICD-10-CM

## 2021-02-16 DIAGNOSIS — M25.512 PAIN OF BOTH SHOULDER JOINTS: ICD-10-CM

## 2021-02-16 DIAGNOSIS — M25.511 PAIN OF BOTH SHOULDER JOINTS: ICD-10-CM

## 2021-02-18 ENCOUNTER — OFFICE VISIT - HEALTHEAST (OUTPATIENT)
Dept: PHYSICAL THERAPY | Facility: REHABILITATION | Age: 66
End: 2021-02-18

## 2021-02-18 DIAGNOSIS — M25.512 PAIN OF BOTH SHOULDER JOINTS: ICD-10-CM

## 2021-02-18 DIAGNOSIS — M25.612 SHOULDER JOINT STIFFNESS, BILATERAL: ICD-10-CM

## 2021-02-18 DIAGNOSIS — M25.511 PAIN OF BOTH SHOULDER JOINTS: ICD-10-CM

## 2021-02-18 DIAGNOSIS — M75.02 ADHESIVE CAPSULITIS OF LEFT SHOULDER: ICD-10-CM

## 2021-02-18 DIAGNOSIS — M25.611 SHOULDER JOINT STIFFNESS, BILATERAL: ICD-10-CM

## 2021-02-20 ENCOUNTER — IMMUNIZATION (OUTPATIENT)
Dept: FAMILY MEDICINE | Facility: CLINIC | Age: 66
End: 2021-02-20
Payer: COMMERCIAL

## 2021-02-20 PROCEDURE — 0001A PR COVID VAC PFIZER DIL RECON 30 MCG/0.3 ML IM: CPT

## 2021-02-20 PROCEDURE — 91300 PR COVID VAC PFIZER DIL RECON 30 MCG/0.3 ML IM: CPT

## 2021-02-22 ENCOUNTER — OFFICE VISIT (OUTPATIENT)
Dept: FAMILY MEDICINE | Facility: CLINIC | Age: 66
End: 2021-02-22
Payer: COMMERCIAL

## 2021-02-22 VITALS
WEIGHT: 141.4 LBS | HEART RATE: 82 BPM | HEIGHT: 61 IN | BODY MASS INDEX: 26.7 KG/M2 | TEMPERATURE: 98.2 F | OXYGEN SATURATION: 98 % | RESPIRATION RATE: 16 BRPM | DIASTOLIC BLOOD PRESSURE: 90 MMHG | SYSTOLIC BLOOD PRESSURE: 154 MMHG

## 2021-02-22 DIAGNOSIS — M25.512 CHRONIC LEFT SHOULDER PAIN: ICD-10-CM

## 2021-02-22 DIAGNOSIS — I10 BENIGN ESSENTIAL HYPERTENSION: Chronic | ICD-10-CM

## 2021-02-22 DIAGNOSIS — H81.10 BENIGN PAROXYSMAL POSITIONAL VERTIGO, UNSPECIFIED LATERALITY: Primary | ICD-10-CM

## 2021-02-22 DIAGNOSIS — G89.29 CHRONIC LEFT SHOULDER PAIN: ICD-10-CM

## 2021-02-22 LAB
CHOLEST SERPL-MCNC: 167.4 MG/DL (ref 0–200)
CHOLEST/HDLC SERPL: 3.5 {RATIO} (ref 0–5)
HBA1C MFR BLD: 5.7 % (ref 4.1–5.7)
HDLC SERPL-MCNC: 48.3 MG/DL
LDLC SERPL CALC-MCNC: 90 MG/DL (ref 0–129)
TRIGL SERPL-MCNC: 146 MG/DL (ref 0–150)
VLDL CHOLESTEROL: 29.2 MG/DL (ref 7–32)

## 2021-02-22 PROCEDURE — 36415 COLL VENOUS BLD VENIPUNCTURE: CPT | Performed by: STUDENT IN AN ORGANIZED HEALTH CARE EDUCATION/TRAINING PROGRAM

## 2021-02-22 PROCEDURE — 83036 HEMOGLOBIN GLYCOSYLATED A1C: CPT | Performed by: STUDENT IN AN ORGANIZED HEALTH CARE EDUCATION/TRAINING PROGRAM

## 2021-02-22 PROCEDURE — 99214 OFFICE O/P EST MOD 30 MIN: CPT | Mod: GC | Performed by: STUDENT IN AN ORGANIZED HEALTH CARE EDUCATION/TRAINING PROGRAM

## 2021-02-22 PROCEDURE — 80061 LIPID PANEL: CPT | Performed by: STUDENT IN AN ORGANIZED HEALTH CARE EDUCATION/TRAINING PROGRAM

## 2021-02-22 RX ORDER — LISINOPRIL 5 MG/1
5 TABLET ORAL DAILY
Qty: 60 TABLET | Refills: 3 | Status: SHIPPED | OUTPATIENT
Start: 2021-02-22 | End: 2021-06-28

## 2021-02-22 RX ORDER — LISINOPRIL 10 MG/1
10 TABLET ORAL DAILY
Qty: 90 TABLET | Refills: 3 | Status: SHIPPED | OUTPATIENT
Start: 2021-02-22 | End: 2021-06-28

## 2021-02-22 ASSESSMENT — MIFFLIN-ST. JEOR: SCORE: 1111.64

## 2021-02-22 NOTE — PROGRESS NOTES
ASSESSMENT AND PLAN     1. Benign paroxysmal positional vertigo, unspecified laterality  Patient now with three separate episodes of positional vertigo like dizziness related to positional changes especially with turning of her head. No nausea/vomiting, no numbness/tingling/weakness, no diplopia or headache. Vital signs stable today. No focal neurological deficits on exam. Positive modified [place]-Hallpike on the left in which dizziness was elicited. Discussed with patient that given exam findings, likely consistent with positional vertigo, and less likely having a peripheral lesion or stroke occurring at this time. Demonstrated Epley maneuvers with patient during the visit which she will continue to try at home. Her dizziness has already been improving over the past couple of days. Also, discussed with patient that she can try vestibular therapy to help with her vertigo.   - PHYSICAL THERAPY REFERRAL; Future  - Epley Maneuvers    2. Benign essential hypertension  BP elevated on two separate occasions today to 154/90. She has a home blood pressure cuff that she states has been reading into the SBPs 130s-140s at the highest at home. No red flag symptoms noted. Given her dizziness, we had a patient centered discussion regarding her blood pressure medication. She takes 15mg of lisinopril consistently at 7am each morning. She would like to get her dizziness under control before titrating up on any medications. I think this is reasonable and therefore will see her back in 2 weeks. At that time, if still elevated would discuss possible titration up to 20mg (then she will only have to take one pill), and will also encourage bedtime usage. She would also like her annual labs today.   - lisinopril (ZESTRIL) 10 MG tablet; Take 1 tablet (10 mg) by mouth daily  Dispense: 90 tablet; Refill: 3  - lisinopril (ZESTRIL) 5 MG tablet; Take 1 tablet (5 mg) by mouth daily  Dispense: 60 tablet; Refill: 3  - Hemoglobin A1c ([facility])  -  Lipid Panel ([facility]) - Results < 1 hr    3. Chronic left shoulder pain  Was seen prior for suspected adhesive capsulitis. Was started on naproxen and sent to physical therapy. Has been completing physical therapy and has a couple sessions left. Down to taking NSAIDs to only 1-2 times per week. Significant improvement in range of motion on exam today. Discussed that she should expect to have some baseline pain in her shoulder due to usage over the years and possible arthritis. She understands and agrees with plan.     RTC in 2 weeks for follow up of HTN or sooner if develops new or worsening symptoms.    The patient was seen by, and discussed with, Dr. Tere Marrero, who agrees with the plan.    Queta Clark MD PGY3  [facility]         SUBJECTIVE       Razia Dos Santos is a 65 year old  female with a PMH significant for:     Patient Active Problem List   Diagnosis     Pure hypercholesterolemia     Insomnia     Nonspecific reaction to tuberculin skin test without active tuberculosis     Migraine     Abnormal Pap smear of cervix     Osteopenia     Glaucoma     Benign essential hypertension     Pre-diabetes     Chronic rhinitis     Breast cancer screening     She presents with dizziness.     Dizziness: Has had a couple episodes in the past that resolved within 30 seconds. Room spinning when wakes up in the morning. When position changes with her head, exacerbates symptoms. Has been improved the past two days. Intermittent. Dizziness comes more often, lasts for about 60 seconds and has to lay down. When turns head, exacerbates symptoms. No syncope, diaphoresis, headache, vision changes, nausea/vomiting. Denies chest pain or shortness of breath. Denies numbness/tingling/weakness. Has been drinking plenty of fluid. Does not have diplopia. No problems with balance. No recent falls. No dizziness when goes from a sitting to standing position.     Got first dose of COVID vaccine on Saturday. Injection into right arm.  "Feeling fine.     Daily blood pressure readings: have been in the 130s and 140s. At home are not as high as here. Takes medication at 7am. Dizzy before and after, does not matter when she takes her blood pressure medications. Sometimes happens all day with position changes.     Dexa scan in 8/2020 and suggested repeat in 3 years.     PMH, Medications and Allergies were reviewed and updated as needed.        REVIEW OF SYSTEMS     ROS reviewed in HPI.         OBJECTIVE     Vitals:    02/22/21 0846 02/22/21 0847 02/22/21 0911   BP: (!) 164/92 (!) 165/92 (!) 154/90   BP Location: Left arm Left arm Left arm   Patient Position: Sitting Sitting Sitting   Cuff Size: Adult Regular Adult Regular Adult Regular   Pulse: 82     Resp: 16     Temp: 98.2  F (36.8  C)     TempSrc: Oral     SpO2: 98%     Weight: 64.1 kg (141 lb 6.4 oz)     Height: 1.538 m (5' 0.55\")       Body mass index is 27.11 kg/m .    General: Alert, well appearing, no acute distress  Eyes: PERRL, EOMI, normal conjunctiva, no nystagmus  HENT: Normocephalic, atraumatic; moist mucous membranes, no oropharyngeal erythema  Neck: No tenderness, no lymphadenopathy  Lungs: Clear to auscultation bilaterally, no wheezing, no rhonchi, no crackles  CV: Regular rate and rhythm, no murmur, no rubs, no gallops  Abdomen: Soft, nontender, non-distended, no masses palpated, normal bowel sounds  Extremities: Able to move all extremities, nontender, normal strength  Neuro: CN II-XII intact, normal sensation, no focal neurological deficits, normal finger to nose. Positive modified [place]-Hallpike to the left (elicited dizziness).   Skin: Dry, no cyanosis, no abrasions, no discoloration.    Results for orders placed or performed in visit on 02/22/21 (from the past 24 hour(s))   Hemoglobin A1c ([facility])   Result Value Ref Range    Hemoglobin A1C 5.7 4.1 - 5.7 %   Lipid Panel ([facility]) - Results < 1 hr   Result Value Ref Range    Cholesterol 167.4 0.0 - 200.0 mg/dL    Cholesterol/HDL Ratio " 3.5 0.0 - 5.0    HDL Cholesterol 48.3 >40.0 mg/dL    LDL Cholesterol Calculated 90 0 - 129 mg/dL    Triglycerides 146.0 0.0 - 150.0 mg/dL    VLDL Cholesterol 29.2 7.0 - 32.0 mg/dL

## 2021-02-22 NOTE — PATIENT INSTRUCTIONS
1. Benign paroxysmal positional vertigo, unspecified laterality  Continue to use the Epley maneuvers at home. Let's try vestibular therapy to help with your dizziness, since you did such a great job with your shoulder.   - PHYSICAL THERAPY REFERRAL; Future    2. Benign essential hypertension  Let's recheck in 2 weeks. Can consider going up to 20mg if still high. Make sure to keep up the great work recording your blood pressures at home!  - lisinopril (ZESTRIL) 10 MG tablet; Take 1 tablet (10 mg) by mouth daily  Dispense: 90 tablet; Refill: 3  - lisinopril (ZESTRIL) 5 MG tablet; Take 1 tablet (5 mg) by mouth daily  Dispense: 60 tablet; Refill: 3    3. Chronic left shoulder pain  -Continue physical therapy and advil as needed.     Take care and see you in two weeks!    Be well,    Dr. Clark

## 2021-02-22 NOTE — NURSING NOTE
Due to patient being non-English speaking/uses sign language, an  was used for this visit. Only for face-to-face interpretation by an external agency, date and length of interpretation can be found on the scanned worksheet.     name: Sanjay  Agency: [employer]  Language: Georgian   Telephone number:   Type of interpretation: Telephone, spoken

## 2021-02-22 NOTE — PROGRESS NOTES
Preceptor Attestation:    I discussed the patient with the resident and evaluated the patient in person. I have verified the content of the note, which accurately reflects my assessment of the patient and the plan of care.   Supervising Physician:  Tere Marrero MD.

## 2021-02-23 ENCOUNTER — OFFICE VISIT - HEALTHEAST (OUTPATIENT)
Dept: PHYSICAL THERAPY | Facility: REHABILITATION | Age: 66
End: 2021-02-23

## 2021-02-23 DIAGNOSIS — M25.612 SHOULDER JOINT STIFFNESS, BILATERAL: ICD-10-CM

## 2021-02-23 DIAGNOSIS — M25.511 PAIN OF BOTH SHOULDER JOINTS: ICD-10-CM

## 2021-02-23 DIAGNOSIS — M25.512 PAIN OF BOTH SHOULDER JOINTS: ICD-10-CM

## 2021-02-23 DIAGNOSIS — M25.611 SHOULDER JOINT STIFFNESS, BILATERAL: ICD-10-CM

## 2021-02-23 DIAGNOSIS — M75.02 ADHESIVE CAPSULITIS OF LEFT SHOULDER: ICD-10-CM

## 2021-02-23 NOTE — RESULT ENCOUNTER NOTE
ASCVD risk calculator 12.1%, recommend moderate to high intensity statin. On moderate intensity statin with simvastatin. Will continue to monitor.    Queta Clark MD PGY3  [facility]

## 2021-02-23 NOTE — RESULT ENCOUNTER NOTE
"Peng Hay,    Please call patient with a Guamanian  with the following message:     \"Peng Randle,    Just wanted to call you and let you know that your labs from yesterday looked great! Your cholesterol levels look good. Continue on your simvastatin 20mg daily to help keep your cholesterol low. Also, you do not have diabetes.     Keep up the great work! See you in two weeks!    Take care,    Dr. Clark\""

## 2021-02-25 ENCOUNTER — OFFICE VISIT - HEALTHEAST (OUTPATIENT)
Dept: PHYSICAL THERAPY | Facility: REHABILITATION | Age: 66
End: 2021-02-25

## 2021-02-25 DIAGNOSIS — M75.02 ADHESIVE CAPSULITIS OF LEFT SHOULDER: ICD-10-CM

## 2021-02-25 DIAGNOSIS — M25.511 PAIN OF BOTH SHOULDER JOINTS: ICD-10-CM

## 2021-02-25 DIAGNOSIS — M25.512 PAIN OF BOTH SHOULDER JOINTS: ICD-10-CM

## 2021-02-25 DIAGNOSIS — M25.611 SHOULDER JOINT STIFFNESS, BILATERAL: ICD-10-CM

## 2021-02-25 DIAGNOSIS — M25.612 SHOULDER JOINT STIFFNESS, BILATERAL: ICD-10-CM

## 2021-03-01 ENCOUNTER — OFFICE VISIT - HEALTHEAST (OUTPATIENT)
Dept: PHYSICAL THERAPY | Facility: REHABILITATION | Age: 66
End: 2021-03-01

## 2021-03-01 DIAGNOSIS — M25.612 SHOULDER JOINT STIFFNESS, BILATERAL: ICD-10-CM

## 2021-03-01 DIAGNOSIS — M75.02 ADHESIVE CAPSULITIS OF LEFT SHOULDER: ICD-10-CM

## 2021-03-01 DIAGNOSIS — M25.611 SHOULDER JOINT STIFFNESS, BILATERAL: ICD-10-CM

## 2021-03-01 DIAGNOSIS — M25.511 PAIN OF BOTH SHOULDER JOINTS: ICD-10-CM

## 2021-03-01 DIAGNOSIS — M25.512 PAIN OF BOTH SHOULDER JOINTS: ICD-10-CM

## 2021-03-11 ENCOUNTER — OFFICE VISIT (OUTPATIENT)
Dept: FAMILY MEDICINE | Facility: CLINIC | Age: 66
End: 2021-03-11
Payer: COMMERCIAL

## 2021-03-11 VITALS
RESPIRATION RATE: 16 BRPM | OXYGEN SATURATION: 100 % | HEART RATE: 79 BPM | DIASTOLIC BLOOD PRESSURE: 78 MMHG | SYSTOLIC BLOOD PRESSURE: 118 MMHG | TEMPERATURE: 98.3 F

## 2021-03-11 DIAGNOSIS — H81.10 BENIGN PAROXYSMAL POSITIONAL VERTIGO, UNSPECIFIED LATERALITY: ICD-10-CM

## 2021-03-11 DIAGNOSIS — I10 BENIGN ESSENTIAL HYPERTENSION: Primary | Chronic | ICD-10-CM

## 2021-03-11 DIAGNOSIS — L29.9 ITCHING: ICD-10-CM

## 2021-03-11 PROCEDURE — 99213 OFFICE O/P EST LOW 20 MIN: CPT | Mod: GC | Performed by: STUDENT IN AN ORGANIZED HEALTH CARE EDUCATION/TRAINING PROGRAM

## 2021-03-11 RX ORDER — ZOSTER VACCINE RECOMBINANT, ADJUVANTED 50 MCG/0.5
KIT INTRAMUSCULAR
COMMUNITY
Start: 2020-11-19 | End: 2021-11-23

## 2021-03-11 RX ORDER — TRIAMCINOLONE ACETONIDE 0.25 MG/G
OINTMENT TOPICAL 2 TIMES DAILY
Qty: 80 G | Refills: 0 | Status: SHIPPED | OUTPATIENT
Start: 2021-03-11 | End: 2021-06-28

## 2021-03-11 NOTE — PATIENT INSTRUCTIONS
1. Continue your blood pressure medications  2. Come back to clinic for your wellness check    Great to see you today Razia!  Dr. Clark

## 2021-03-11 NOTE — PROGRESS NOTES
Preceptor Attestation:    I discussed the patient with the resident and evaluated the patient in person. I have verified the content of the note, which accurately reflects my assessment of the patient and the plan of care.   Supervising Physician:  Walter He MD.

## 2021-03-11 NOTE — PROGRESS NOTES
ASSESSMENT AND PLAN     1. Benign essential hypertension  Improved from prior. Daily blood pressure readings are less than 140/90. Given this and no other systemic symptoms, will continue 15mg of lisinopril daily and have her follow up in 6 months for a blood pressure recheck.     2. Benign paroxysmal positional vertigo, unspecified laterality  Improved from prior. Has been doing the Epley Maneuvers at home with great improvement. No other episodes of dizziness from turning her head.   - Will continue to monitor.     3. Itching  Would like a refill for her itching that she has from time to time. Education provided on sparing use of topical corticosteroids. Only uses once every few months.   - triamcinolone (KENALOG) 0.025 % external ointment; Apply topically 2 times daily  Dispense: 80 g; Refill: 0    Scheduled 65+ wellness visit today.     RTC in 6 months for follow up of blood pressure or sooner if develops new or worsening symptoms.    The patient was seen by, and discussed with, Dr. Walter He, who agrees with the plan.    Queta Clark MD PGY3  [facility]         SUBJECTIVE       Razia Dos Santos is a 65 year old  female with a PMH significant for:     Patient Active Problem List   Diagnosis     Pure hypercholesterolemia     Insomnia     Nonspecific reaction to tuberculin skin test without active tuberculosis     Migraine     Abnormal Pap smear of cervix     Osteopenia     Glaucoma     Benign essential hypertension     Pre-diabetes     Chronic rhinitis     Breast cancer screening     She presents for a blood pressure follow up.     She is here to recheck her blood pressures. At home, BP readings have been below 140s/90s with highest reading in low 130s SBP. Denies headache, vision changes, dizziness, chest pain or shortness of breath. Has tried Epley maneuvers with improvement in dizziness symptoms. Has not had another episode of dizziness since last visit. Would like to go over labs once again to make  sure everything is okay.     PMH, Medications and Allergies were reviewed and updated as needed.        REVIEW OF SYSTEMS     ROS reviewed in HPI.         OBJECTIVE     Vitals:    03/11/21 0816   BP: 118/78   BP Location: Left arm   Patient Position: Sitting   Cuff Size: Adult Regular   Pulse: 79   Resp: 16   Temp: 98.3  F (36.8  C)   TempSrc: Oral   SpO2: 100%     There is no height or weight on file to calculate BMI.    General: Alert, well appearing, no acute distress  Eyes: PERRL, EOMI, normal conjunctiva  HENT: Normocephalic, atraumatic; moist mucous membranes, no oropharyngeal erythema  Neck: No tenderness, no lymphadenopathy  Lungs: Clear to auscultation bilaterally, no wheezing, no rhonchi, no crackles  CV: Regular rate and rhythm, no murmur, no rubs, no gallops  Abdomen: Soft, nontender, non-distended, no masses palpated, normal bowel sounds  Extremities: Able to move all extremities, nontender, normal strength  Neuro: Grossly normal  Skin: Dry, no cyanosis, no abrasions, no discoloration.    No results found for this or any previous visit (from the past 24 hour(s)).

## 2021-03-13 ENCOUNTER — IMMUNIZATION (OUTPATIENT)
Dept: FAMILY MEDICINE | Facility: CLINIC | Age: 66
End: 2021-03-13
Payer: COMMERCIAL

## 2021-03-13 PROCEDURE — 0002A PR COVID VAC PFIZER DIL RECON 30 MCG/0.3 ML IM: CPT

## 2021-03-13 PROCEDURE — 91300 PR COVID VAC PFIZER DIL RECON 30 MCG/0.3 ML IM: CPT

## 2021-03-23 ENCOUNTER — OFFICE VISIT (OUTPATIENT)
Dept: FAMILY MEDICINE | Facility: CLINIC | Age: 66
End: 2021-03-23
Payer: COMMERCIAL

## 2021-03-23 VITALS
DIASTOLIC BLOOD PRESSURE: 74 MMHG | OXYGEN SATURATION: 98 % | SYSTOLIC BLOOD PRESSURE: 109 MMHG | RESPIRATION RATE: 16 BRPM | HEART RATE: 98 BPM | TEMPERATURE: 98.5 F | HEIGHT: 61 IN | BODY MASS INDEX: 26.7 KG/M2 | WEIGHT: 141.4 LBS

## 2021-03-23 DIAGNOSIS — Z00.00 ENCOUNTER FOR MEDICARE ANNUAL WELLNESS EXAM: Primary | ICD-10-CM

## 2021-03-23 LAB
ALBUMIN SERPL BCP-MCNC: 4.6 G/DL (ref 3.5–5)
ALP SERPL-CCNC: 71 U/L (ref 45–120)
ALT SERPL W/O P-5'-P-CCNC: 21 U/L (ref 0–45)
ANION GAP SERPL CALCULATED.3IONS-SCNC: 12 MMOL/L (ref 5–18)
AST SERPL-CCNC: 25 U/L (ref 0–40)
BILIRUB SERPL-MCNC: 0.5 MG/DL (ref 0–1)
BUN SERPL-MCNC: 12 MG/DL (ref 8–22)
CALCIUM SERPL-MCNC: 9.8 MG/DL (ref 8.5–10.5)
CHLORIDE SERPL-SCNC: 106 MMOL/L (ref 98–107)
CO2 SERPL-SCNC: 25 MMOL/L (ref 22–31)
CREAT SERPL-MCNC: 0.91 MG/DL (ref 0.6–1.1)
GLUCOSE SERPL-MCNC: 111 MG/DL (ref 70–125)
POTASSIUM SERPL-SCNC: 4.6 MMOL/L (ref 3.5–5)
PROT SERPL-MCNC: 8.4 G/DL (ref 6–8)
SODIUM SERPL-SCNC: 143 MMOL/L (ref 136–145)

## 2021-03-23 PROCEDURE — 36415 COLL VENOUS BLD VENIPUNCTURE: CPT | Performed by: STUDENT IN AN ORGANIZED HEALTH CARE EDUCATION/TRAINING PROGRAM

## 2021-03-23 PROCEDURE — 99397 PER PM REEVAL EST PAT 65+ YR: CPT | Mod: GC | Performed by: STUDENT IN AN ORGANIZED HEALTH CARE EDUCATION/TRAINING PROGRAM

## 2021-03-23 ASSESSMENT — MIFFLIN-ST. JEOR: SCORE: 1115.38

## 2021-03-23 NOTE — PROGRESS NOTES
Medicare Annual Wellness Visit         HPI     This 65 year old female presents as an established patient  uQeta Clark who presents for an subsequent Medicare Wellness Exam.  Patient also reports: Concerns about  having liver cancer. He has a history of hepatitis B. Patient has received hepatitis B vaccine and has a positive surface antibody from 9 years ago.     A Frisian  was used for  this visit.      Patient Active Problem List   Diagnosis     Pure hypercholesterolemia     Insomnia     Nonspecific reaction to tuberculin skin test without active tuberculosis     Migraine     Abnormal Pap smear of cervix     Osteopenia     Glaucoma     Benign essential hypertension     Pre-diabetes     Chronic rhinitis     Breast cancer screening     Benign paroxysmal positional vertigo, unspecified laterality       Past Medical History:   Diagnosis Date     HPV (low risk) 3/14/2013    3/2013:  Normal pap, positive for low risk HPV.   H/O ASCUS in 2005, +HPV of undetermined risk in 2012 No history of high grade lesion     Hypertension         Family History   Problem Relation Age of Onset     No Known Problems Mother      No Known Problems Father      No Known Problems Maternal Grandmother      No Known Problems Maternal Grandfather      No Known Problems Paternal Grandmother      No Known Problems Paternal Grandfather      No Known Problems Brother      No Known Problems Sister      No Known Problems Son      No Known Problems Daughter      No Known Problems Maternal Half-Brother      No Known Problems Maternal Half-Sister      No Known Problems Paternal Half-Brother      No Known Problems Paternal Half-Sister      No Known Problems Niece      No Known Problems Nephew      No Known Problems Cousin      No Known Problems Other      Diabetes No family hx of      Coronary Artery Disease No family hx of      Hypertension No family hx of      Hyperlipidemia No family hx of      Breast Cancer No family hx of       Cancer - colorectal No family hx of      Ovarian Cancer No family hx of      Prostate Cancer No family hx of      Other Cancer No family hx of      Mental Illness No family hx of      Cerebrovascular Disease No family hx of      Anesthesia Reaction No family hx of      Asthma No family hx of      Osteoporosis No family hx of      Known Genetic Syndrome No family hx of      Obesity No family hx of      Unknown/Adopted No family hx of      Cancer No family hx of      Heart Disease No family hx of      Kidney Disease No family hx of      Thrombosis No family hx of      Arthritis No family hx of      Thyroid Disease No family hx of      Depression No family hx of      Mental Illness No family hx of      Substance Abuse No family hx of      Cystic Fibrosis No family hx of      Early Death No family hx of      Coronary Artery Disease Early Onset No family hx of      Heart Failure No family hx of      Bleeding Diathesis No family hx of      Dementia No family hx of      Uterine Cancer No family hx of      Colorectal Cancer No family hx of      Pancreatic Cancer No family hx of      Lung Cancer No family hx of      Melanoma No family hx of      Autoimmune Disease No family hx of      Genetic Disorder No family hx of          History reviewed. No pertinent surgical history.    Reviewed no other significant FH    Family History and past Medical History reviewed and it is unchanged/updated.       Review of Systems     Constitutional: Any fevers or night sweats? No      Eyes:  Vision problems    YES Dry eyes     Hearing Do you feel you have hearing loss?  No      Cardiovascular: Any chest pain, fast or irregular heart beat, calf pain with walking?      YES chest pain with activity           Respiratory:   Any breathing problems or cough?    YES SOB with activity     Gastrointestinal: Any stomach or stool problems?   No       Genitourinary: Do you have difficulty controlling urination?   No      Muscles and Joints: Any joint  stiffness or soreness?    YES Left shoulder pain, arm pain     Skin: Any concerning lesions or moles?   No      Nervous System: Any loss of strength or feeling, numbness or tingling, shaking, dizziness, or headache?  No      Mental Health: Any depression, anxiety or problems sleeping?     YES sleep problems at night     Cognition: Do you have any problems with your memory?   YES Sometimes forgetful         Medical Care     Have you been to an ER or a hospital in the last year? No  What other specialists or organizations are involved in your medical care?  None  Current providers sharing in care for this patient include:  Patient Care Team:  Queta Clark MD as PCP - General (Student in organized health care education/training program)  Queta Clark MD as Assigned PCP         Social History     Social History     Tobacco Use     Smoking status: Never Smoker     Smokeless tobacco: Never Used   Substance Use Topics     Alcohol use: No     Marital Status:  Who lives in your household? 2     Does your home have any of the following safety concerns? Loose rugs in the hallway, no grab bars in the bathroom, no handrails on the stairs or have poorly lit areas?   YES      Do you feel threatened or controlled by a partner, ex-partner or anyone in your life? No      Has anyone hurt you physically, for example by pushing, hitting, slapping or kicking you   or forcing you to have sex? No       Risk Behaviors and Healthy Habits     See Nursing Note    Sexual Health     See Nursing Note    FUNCTIONAL ABILITY/SAFETY SCREENING     Fall Risk Assessment Today: Fallen 2 or more times in the past year?: No  Any fall with injury in the past year?: No    Hearing evaluation if done: No    EVALUATION OF COGNITIVE FUNCTION     Mood/affect:Normal  Appearance:Normal  Family member/caregiver input: None    Mini Cog Scoring   3 points  Clock Draw Test result:  Normal      SCREENING FOR PREVENTION and EARLY DETECTION     ECG (if done)not  "performed    Screening Lipid Level (covered every 5 years ): Last performed on 2021 and normal.   Breast CA Screenin-2 years 50-74years:  Date done 20  Result(s) Normal and Dexa Scan (>65 yrs) (covered every 2 years):  Date done 20  Result(s) Normal.     CV Risk based on Pooled Cohort Risk:  The 10-year ASCVD risk score (Felix RAVI Jr., et al., 2013) is: 5.8%    Values used to calculate the score:      Age: 65 years      Sex: Female      Is Non- : No      Diabetic: No      Tobacco smoker: No      Systolic Blood Pressure: 109 mmHg      Is BP treated: Yes      HDL Cholesterol: 48.3 mg/dL      Total Cholesterol: 167.4 mg/dL    Advanced Directives: Discussed and patient desires to be full code.      Immunization History   Administered Date(s) Administered     COVID-19,PF,Pfizer 2021, 2021     Flu, Unspecified 2011     HEPA 2006, 2006     HepA-Adult 2006, 2006     HepB 2006, 2006, 2011     HepB-Adult 2006, 2006, 2006     Influenza (IIV3) PF 10/16/2007, 2011     Influenza Vaccine, 6+MO IM (QUADRIVALENT W/PRESERVATIVES) 10/15/2015, 2017, 2018     Influenza, Quad, High Dose, Pf, 65yr + 2020     MMR 2011     Pneumococcal 23 valent 2020     TD (ADULT, 7+) 1999     TDAP Vaccine (Boostrix) 2011     Typhoid IM 2006       Reviewed Immunization Record Today  Pneumoccocal Vaccine: Up to date  Varicella Vaccine: Up to date  TDaP:Up to date         Physical Exam     Vitals: /74 (BP Location: Left arm, Patient Position: Sitting, Cuff Size: Adult Regular)   Pulse 98   Temp 98.5  F (36.9  C) (Oral)   Resp 16   Ht 1.544 m (5' 0.79\")   Wt 64.1 kg (141 lb 6.4 oz)   SpO2 98%   BMI 26.90 kg/m    BMI= Body mass index is 26.9 kg/m .  GENERAL APPEARANCE: healthy, alert and no distress  EYES: Eyes grossly normal to inspection, PERRL and conjunctivae and sclerae " normal  HENT: ear canals and TM's normal, nose and mouth without ulcers or lesions, oropharynx clear and oral mucous membranes moist  NECK: no adenopathy, no asymmetry, masses, or scars and thyroid normal to palpation  RESP: lungs clear to auscultation - no rales, rhonchi or wheezes  CV: regular rate and rhythm, normal S1 S2, no S3 or S4, no murmur, click or rub, no peripheral edema and peripheral pulses strong  ABDOMEN: soft, nontender, no hepatosplenomegaly, no masses and bowel sounds normal  MS: no musculoskeletal defects are noted and gait is age appropriate without ataxia  SKIN: no suspicious lesions or rashes  NEURO: Normal strength and tone, sensory exam grossly normal, mentation intact and speech normal  PSYCH: mentation appears normal and affect normal/bright        Assessment and Plan   Razia Dos Santos is a 65 year old female with a history of hypertension who presents today for her annual Medicare Wellness Exam. She is currently up to date on all screenings and vaccinations.   1. Health Care Maintenance: Normal Physical Exam    PLAN:  1. Patient is up to date on all of her preventative screenings    2. Discussed healthy lifestyle including dietary and exercise recommendations.   3. Will obtain hepatitis B serologies and hepatic panel per patient request.       Options for treatment and follow-up care were reviewed with the Razia Dos Santos and/or guardian engaged in the decision making process and verbalized understanding of the options discussed and agreed with the final plan.    Patient and plan discussed with precepting physician, Dr. Pa Perdo.     Queta Clark MD PGY3  [facility]

## 2021-03-23 NOTE — PATIENT INSTRUCTIONS
Personalized Prevention Plan  You are due for the preventive services outlined below.  Your care team is available to assist you in scheduling these services.  If you have already completed any of these items, please share that information with your care team to update in your medical record.  Health Maintenance Due   Topic Date Due     Discuss Advance Care Planning  Never done     FALL RISK ASSESSMENT  12/05/2020     Preventive Health Recommendations    See your health care provider every year to    Review health changes.     Discuss preventive care.      Review your medicines if your doctor has prescribed any.    You no longer need a yearly Pap test unless you've had an abnormal Pap test in the past 10 years. If you have vaginal symptoms, such as bleeding or discharge, be sure to talk with your provider about a Pap test.    Every 1 to 2 years, have a mammogram.  If you are over 69, talk with your health care provider about whether or not you want to continue having screening mammograms.    Every 10 years, have a colonoscopy. Or, have a yearly FIT test (stool test). These exams will check for colon cancer.     Have a cholesterol test every 5 years, or more often if your doctor advises it.     Have a diabetes test (fasting glucose) every three years. If you are at risk for diabetes, you should have this test more often.     At age 65, have a bone density scan (DEXA) to check for osteoporosis (brittle bone disease).    Shots:    Get a flu shot each year.    Get a tetanus shot every 10 years.    Talk to your doctor about your pneumonia vaccines. There are now two you should receive - Pneumovax (PPSV 23) and Prevnar (PCV 13).    Talk to your pharmacist about the shingles vaccine.    Talk to your doctor about the hepatitis B vaccine.    Nutrition:     Eat at least 5 servings of fruits and vegetables each day.    Eat whole-grain bread, whole-wheat pasta and brown rice instead of white grains and rice.    Get adequate  Calcium and Vitamin D.     Lifestyle    Exercise at least 150 minutes a week (30 minutes a day, 5 days a week). This will help you control your weight and prevent disease.    Limit alcohol to one drink per day.    No smoking.     Wear sunscreen to prevent skin cancer.     See your dentist twice a year for an exam and cleaning.    See your eye doctor every 1 to 2 years to screen for conditions such as glaucoma, macular degeneration and cataracts.    Personalized Prevention Plan  You are due for the preventive services outlined below.  Your care team is available to assist you in scheduling these services.  If you have already completed any of these items, please share that information with your care team to update in your medical record.  Health Maintenance   Topic Date Due     ADVANCE CARE PLANNING  Never done     FALL RISK ASSESSMENT  12/05/2020     DTAP/TDAP/TD IMMUNIZATION (3 - Td) 12/13/2021     MEDICARE ANNUAL WELLNESS VISIT  03/23/2022     DEXA  08/17/2022     MAMMO SCREENING  08/21/2022     COLORECTAL CANCER SCREENING  05/11/2025     LIPID  02/22/2026     HEPATITIS C SCREENING  Completed     PHQ-2  Completed     INFLUENZA VACCINE  Completed     Pneumococcal Vaccine: 65+ Years  Completed     ZOSTER IMMUNIZATION  Completed     COVID-19 Vaccine  Completed     Pneumococcal Vaccine: Pediatrics (0 to 5 Years) and At-Risk Patients (6 to 64 Years)  Aged Out     IPV IMMUNIZATION  Aged Out     MENINGITIS IMMUNIZATION  Aged Out     HEPATITIS B IMMUNIZATION  Aged Out

## 2021-03-23 NOTE — NURSING NOTE
Medicare Wellness Visit  Health Risk Assessment        Visual Acuity:  Seen eye doctor 3 months ago        FALL RISK ASSESSMENT 3/23/2021 12/5/2019   Fallen 2 or more times in the past year? No No   Any fall with injury in the past year? No No            Health Risk Assessment / Review of Systems     Constitutional: Any fevers or night sweats? No     Eyes:  Vision problems    YES Dry eyes    Hearing Do you feel you have hearing loss?  No     Cardiovascular: Any chest pain, fast or irregular heart beat, calf pain with walking?      YES chest pain with activity          Respiratory:   Any breathing problems or cough?    YES SOB with activity    Gastrointestinal: Any stomach or stool problems?   No      Genitourinary: Do you have difficulty controlling urination?   No     Muscles and Joints: Any joint stiffness or soreness?    YES Left shoulder pain, arm pain    Skin: Any concerning lesions or moles?   No     Nervous System: Any loss of strength or feeling, numbness or tingling, shaking, dizziness, or headache?  No     Mental Health: Any depression, anxiety or problems sleeping?     YES sleep problems at night    Cognition: Do you have any problems with your memory?   YES Sometimes forgetful    PHQ-2 Score:   PHQ-2 ( 1999 Pf[employer]) 3/23/2021 3/11/2021   Q1: Little interest or pleasure in doing things 0 0   Q2: Feeling down, depressed or hopeless 0 0   PHQ-2 Score 0 0       PHQ-9 Score:   [facility] Follow-up to PHQ 1/22/2019   PHQ-9 9. Suicide Ideation past 2 weeks Several days   Thoughts of suicide or self harm in past 2 weeks No   PHQ-9 Safety concerns? No            Medical Care     What other specialists or organizations are involved in your medical care?  Physical therapy, opthamologist  Patient Care Team       Relationship Specialty Notifications Start End    Queta Clark MD PCP - General Student in organized health care education/training program  7/10/19     Phone: [phone] Fax: [phone] [address] [facility]  "[place] [address]    Queta Clark MD Assigned PCP   5/21/20     Phone: [phone] Fax: [phone]         [address] [place] [address]                 Social History / Home Safety     Social History     Tobacco Use     Smoking status: Never Smoker     Smokeless tobacco: Never Used   Substance Use Topics     Alcohol use: No     Marital Status:  Who lives in your household? 2    Does your home have any of the following safety concerns? Loose rugs in the hallway, no grab bars in the bathroom, no handrails on the stairs or have poorly lit areas?   YES     Do you feel threatened or controlled by a partner, ex-partner or anyone in your life? No     Has anyone hurt you physically, for example by pushing, hitting, slapping or kicking you   or forcing you to have sex? No          Functional Status     Do you need help with dressing yourself, bathing, or walking?No     Do you need help with the phone, transportation, shopping, preparing meals, housework, laundry, medications or managing money?No       Risk Behaviors and Healthy Habits     History   Smoking Status     Never Smoker   Smokeless Tobacco     Never Used     How many servings of fruits and vegetables do you eat a day? 1-2    Exercise: 6-7 days/week for an average of 15-30 minutes yoga/stretching     Do you frequently drive without a seatbelt? No     Do you use any other drugs? No         Do you use alcohol?No      Frailty Assessment            1. By yourself and note using aids, do you have difficulty walking up 10 steps without resting?  No  (1 for Yes, 0 for No)    2. By yourself and not using mobility aids, do you have any difficulty walking several hundred yards? No  (1 for Yes, 0 for No)    3. Have you lost 10 or more pounds unintentionally in the previous year? No  (If \"Yes\" and >5% weight loss, then score 1.  Score 0, if <5% weight loss or \"No\" weight loss)    4. How much of the times during the past 3 weeks did you feel tired? 4. A little of " "the time (\"1\" or \"2\" are scored 1, others 0)    5.  A doctor told the patient they had the following illnesses:  Diabetes (0-4 = score 0, 5-11= score 1)        Bharti Cruz CMA    "

## 2021-03-23 NOTE — NURSING NOTE
Due to patient being non-English speaking/uses sign language, an  was used for this visit. Only for face-to-face interpretation by an external agency, date and length of interpretation can be found on the scanned worksheet.     name: Vinayak Ball  Agency: [employer]  Language: Hong Konger   Telephone number: [phone]  Type of interpretation: Telephone, spoken

## 2021-03-23 NOTE — PROGRESS NOTES
Preceptor Attestation:    I discussed the patient with the resident and evaluated the patient in person. I have verified the content of the note, which accurately reflects my assessment of the patient and the plan of care.   Supervising Physician:  Pa Pedro MD.

## 2021-03-24 LAB
HBV SURFACE AB SER-ACNC: POSITIVE M[IU]/ML
HBV SURFACE AG SERPL QL IA: NEGATIVE

## 2021-03-24 NOTE — RESULT ENCOUNTER NOTE
Peng Hay,    Please have a Marshallese  call patient to let her know that her results were normal and she does not have hepatitis B. She is protected from getting the hepatitis B virus.    Thank you,    Queta Clark MD PGY3  [facility]

## 2021-05-27 ENCOUNTER — RECORDS - HEALTHEAST (OUTPATIENT)
Dept: ADMINISTRATIVE | Facility: CLINIC | Age: 66
End: 2021-05-27

## 2021-05-28 ENCOUNTER — RECORDS - HEALTHEAST (OUTPATIENT)
Dept: ADMINISTRATIVE | Facility: CLINIC | Age: 66
End: 2021-05-28

## 2021-05-29 ENCOUNTER — RECORDS - HEALTHEAST (OUTPATIENT)
Dept: ADMINISTRATIVE | Facility: CLINIC | Age: 66
End: 2021-05-29

## 2021-05-30 ENCOUNTER — RECORDS - HEALTHEAST (OUTPATIENT)
Dept: ADMINISTRATIVE | Facility: CLINIC | Age: 66
End: 2021-05-30

## 2021-05-31 ENCOUNTER — RECORDS - HEALTHEAST (OUTPATIENT)
Dept: ADMINISTRATIVE | Facility: CLINIC | Age: 66
End: 2021-05-31

## 2021-06-08 ENCOUNTER — RECORDS - HEALTHEAST (OUTPATIENT)
Dept: SCHEDULING | Facility: CLINIC | Age: 66
End: 2021-06-08

## 2021-06-08 DIAGNOSIS — Z78.0 ASYMPTOMATIC MENOPAUSAL STATE: ICD-10-CM

## 2021-06-08 DIAGNOSIS — M85.80 OTHER SPECIFIED DISORDERS OF BONE DENSITY AND STRUCTURE, UNSPECIFIED SITE: ICD-10-CM

## 2021-06-14 NOTE — PROGRESS NOTES
[facility] Daily Progress     Patient Name: Razia Dos Santos  Date: 1/25/2021  Visit #: 2/12  PTA visit #:    Referral Diagnosis: Adhesive capsulitis of left shoulder  Referring provider: Queta Clark MD  Visit Diagnosis:     ICD-10-CM    1. Pain of both shoulder joints  M25.511     M25.512    2. Shoulder joint stiffness, bilateral  M25.611     M25.612    3. Adhesive capsulitis of left shoulder  M75.02          Assessment:   Razia Dos Santos is a 65 y.o. female who presents to therapy today with chief complaints of (B) superior shoulder and prox arm pain. Onset date of sx was 1 month (R), 6 months (L).  Functional impairments include reaching, dressing, grooming, sleeping, lying on side, lifting.  Clinical findings include L>R shoulder A/PROM loss with capsular pattern noted on (L), (B) shoulder tenderness.     Patient demonstrates understanding/independence with home program.  Patient is benefitting from skilled physical therapy and is making steady progress toward functional goals.  Patient is appropriate to continue with skilled physical therapy intervention, as indicated by initial plan of care.    Goal Status:  Pt. will demonstrate/verbalize independence in self-management of condition in : 12 weeks  Pt. will be independent with home exercise program in : 6 weeks  Pt. will have improved quality of sleep: waking less times/night;in 12 weeks;Comment  Comment:: able to lie on side without shoulder pain    Pt will: be able to don shirt or jacket and don bra without increased pain in 12 weeks.      Plan / Patient Education:     Continue with initial plan of care.  Progress with home program as tolerated.   Plan for next session:     Subjective:   Compliant with HEP 3x a day.   Maybe feeling a little bit better.   Pain Rating: not assessed     Objective:   Shoulder/Elbow Strength:  With arm at side only - pt unable to hold flexion and abd position on left side            Date:  1/25/21       Shoulder/Elbow Strength  "(/5)  Manual Muscle Test (MMT) MMT MMT MMT    Right Left Right Left Right Left   Shoulder Flexion  4 + 3+            Supraspinatus               Shoulder Abduction  4 + 3+             Shoulder Extension  4 + 4            Shoulder External Rotation  4 +  3+           Shoulder Internal Rotation  4 + 3+            Elbow Flexion  4 + 3+            Elbow Extension  4 +  3+           * generalized pain on left side     PROM/AAROM empty end feel - significant guarding   Flexion and abduction to 90 degrees  ER to 30 -40 degrees     Exercises:  Exercise #1: passive table flexion and abdcution  Comment #1: 10 x 5-10\"  Exercise #2: passive table ER  Comment #2: 10 x 5-10\"  Exercise #3: shoulder abd w/wand - and IR/ext  Comment #3: 10 x 5-10\"  Exercise #4: wall slide  Comment #4: 10x    Treatment Today     TREATMENT MINUTES COMMENTS   Evaluation     Self-care/ Home management     Manual therapy 10 Supine:   Glenohumeral: Long Axis Distraction to improve mobility and pain control on left  shoulder  Supine: arm in open-packed position (55 degrees ABD, 30 degrees ADD)   Grade II-III sustained hold     STM to deltoid, long head of bicep, pecs        Neuromuscular Re-education 3 K-Tape  Prior to application skin was cleaned with alcohol wipe  3 strips were applied to deltoid on left with mild stretch.  No stretch applied to 2\" ends of tape.   Pt was sitting during application.      Therapeutic Activity     Therapeutic Exercises 16    Gait training     Modality__________________                Total 29    Blank areas are intentional and mean the treatment did not include these items.       Belem Ludwig, PT, DPT  1/25/2021    "

## 2021-06-14 NOTE — PROGRESS NOTES
[facility] Daily Progress     Patient Name: Razia Dos Santos  Date: 2021  Visit #: 3/12  PTA visit #:  1  Referral Diagnosis: Adhesive capsulitis of left shoulder  Referring provider: Queta Clark MD  Visit Diagnosis:     ICD-10-CM    1. Pain of both shoulder joints  M25.511     M25.512    2. Shoulder joint stiffness, bilateral  M25.611     M25.612    3. Adhesive capsulitis of left shoulder  M75.02          Assessment:   Razia Dos Santos is a 65 y.o. female who presents to therapy today with chief complaints of (B) superior shoulder and prox arm pain. Onset date of sx was 1 month (R), 6 months (L).  Functional impairments include reaching, dressing, grooming, sleeping, lying on side, lifting.  Clinical findings include L>R shoulder A/PROM loss with capsular pattern noted on (L), (B) shoulder tenderness.   Complaint with HEP cues needed     Patient demonstrates understanding/independence with home program.  Patient is benefitting from skilled physical therapy and is making steady progress toward functional goals.  Patient is appropriate to continue with skilled physical therapy intervention, as indicated by initial plan of care.    Goal Status: Ongoing  Pt. will demonstrate/verbalize independence in self-management of condition in : 12 weeks  Pt. will be independent with home exercise program in : 6 weeks  Pt. will have improved quality of sleep: waking less times/night;in 12 weeks;Comment  Comment:: able to lie on side without shoulder pain    Pt will: be able to don shirt or jacket and don bra without increased pain in 12 weeks.      Plan / Patient Education:     Continue with initial plan of care.  Progress with home program as tolerated.   Continue MT and KT as needed    Subjective:   Compliant with HEP 3x a day.   Maybe feeling a little bit better.   Pain Ratin/10 with movement  sharonda't lie on that side at night to sleep  Pain medication  None since starting PT      Objective:   Discussed using CP after  "doing home exercises  Added pulleys, scapular retractions, supine wand ex  Flexion and abduction to 130 degrees  ER to  60 degrees  Reapplied KT skin clear  Tolerated treatment well   Exercises:  Exercise #1: passive table flexion and abdcution  Comment #1: 10 x 5-10\"  Exercise #2: passive table ER  Comment #2: 10 x 5-10\"  Exercise #3: shoulder abd w/wand - and IR/ext  Comment #3: 10 x 5-10\"  Exercise #4: wall slide  Comment #4: 10x  Exercise #5: pulleys flex, ABD, and IR 1 min each  Comment #5: supine wand ex for flexion and bench press  Exercise #6: scapular retraction 10x cues for good posture      Treatment Today     TREATMENT MINUTES COMMENTS   Evaluation     Self-care/ Home management     Manual therapy 10 Supine:   Glenohumeral: Long Axis Distraction to improve mobility and pain control on left  shoulder  Supine: arm in open-packed position (55 degrees ABD, 30 degrees ADD)   Grade II-III sustained hold     STM to deltoid, long head of bicep, pecs        Neuromuscular Re-education 5 K-Tape  Prior to application skin was cleaned with alcohol wipe  3 strips were applied to deltoid on left with mild stretch.  No stretch applied to 2\" ends of tape.   Pt was sitting during application.      Therapeutic Activity     Therapeutic Exercises 25 Exercises per flow sheet   Gait training     Modality__________________                Total 40    Blank areas are intentional and mean the treatment did not include these items.       Aaliyah Carter, PTA  1/27/2021    "

## 2021-06-14 NOTE — PROGRESS NOTES
[facility]  Shoulder Initial Evaluation    Patient Name: Razia Dos Santos  Date of evaluation: 1/20/2021  Visit #1/12  Referral Diagnosis: Adhesive capsulitis of left shoulder  Referring provider: Queta Clark MD  Visit Diagnosis:     ICD-10-CM    1. Pain of both shoulder joints  M25.511     M25.512    2. Shoulder joint stiffness, bilateral  M25.611     M25.612    3. Adhesive capsulitis of left shoulder  M75.02        Assessment:   Razia Dos Santos is a 65 y.o. female who presents to therapy today with chief complaints of (B) superior shoulder and prox arm pain. Onset date of sx was 1 month (R), 6 months (L).  Functional impairments include reaching, dressing, grooming, sleeping, lying on side, lifting.  Clinical findings include L>R shoulder A/PROM loss with capsular pattern noted on (L), (B) shoulder tenderness.        Pt. is appropriate for skilled PT intervention as outlined in the Plan of Care (POC).    Goals:  Pt. will demonstrate/verbalize independence in self-management of condition in : 12 weeks  Pt. will be independent with home exercise program in : 6 weeks  Pt. will have improved quality of sleep: waking less times/night;in 12 weeks;Comment  Comment:: able to lie on side without shoulder pain    Pt will: be able to don shirt or jacket and don bra without increased pain in 12 weeks.      Patient's expectations/goals are realistic.    Barriers to Learning or Achieving Goals:  Language barriers.       Plan / Patient Instructions:      Will send orders for (R) shoulder to be signed.     Plan of Care:   Authorization / Certification Number of Visits: [employer] no PA needed 12 visits  Communication with: Referral Source  Patient Related Instruction: Nature of Condition;Treatment plan and rationale;Self Care instruction;Basis of treatment;Posture;Next steps  Times per Week: 1-2  Number of Weeks: up to 12  Number of Visits: 12  Discharge Planning: HEP, self management  Therapeutic Exercise:  ROM;Stretching;Strengthening  Neuromuscular Reeducation: kinesio tape  Manual Therapy: myofascial release;strain counterstrain;joint mobilization      POC and pathology of condition were reviewed with patient.  Pt. is in agreement with the Plan of Care  A Home Exercise Program (HEP) was initiated today.     Plan for next visit: Assess strength and special test as needed, manual therapy and stretching, progression of home program   .   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   Razia is a 65 y.o. female who presents to therapy today with complaints of (B) superior shoulder pain, L>R prox arm pain. Date of onset/duration of symptoms is recent for the (R) shoulder, (L) shoulder 5-6 months ago. Onset was insidious. Symptoms are constant. She denies history of similar symptoms. She describes their previous level of function as not limited    Pain Ratin  Pain rating at worst: 10  Pain description: pain    Functional limitations are described as occurring with:   lifting  personal cares donning bra  reaching behind back  sleeping   Painful at night, lying on side  With movement  Donning jacket    Patient reports benefit from:  rest      No past medical history on file.  No past surgical history on file.  There is no problem list on file for this patient.    Patient Active Problem List   Diagnosis     Pure hypercholesterolemia     Insomnia     Nonspecific reaction to tuberculin skin test without active tuberculosis     Migraine     Abnormal Pap smear of cervix     Osteopenia     Glaucoma     Benign essential hypertension     Pre-diabetes     Chronic rhinitis     Breast cancer screening          Objective:      Note: Items left blank indicates the item was not performed or not indicated at the time of the evaluation.    Shoulder  Examination  1. Pain of both shoulder joints     2. Shoulder joint stiffness, bilateral     3. Adhesive capsulitis of left shoulder       Precautions/Restrictions: None  Involved side: Bilateral  Posture Observation:      Shoulder/Thoracic complex: Moderately decreased upper thoracic kyphosis      Shoulder/Elbow ROM    Date:      Shoulder and Elbow ROM ( )   AROM in degrees AROM in degrees AROM in degrees    Right Left Right Left Right Left   Shoulder Flexion (0-180 ) 130 79 (+)       Shoulder Abduction (0-180 ) 135 73 (+)       Shoulder Extension (0-60 )         Shoulder ER (0-90 )         Shoulder IR (0-70 )         Shoulder IR/Ext To L3-4 To SI jt (+)       Elbow Flexion (150 )         Elbow Extension (0 )          PROM in degrees PROM in degrees PROM in degrees    Right Left Right Left Right Left   Shoulder Flexion (0-180 ) 141  82 (+)       Shoulder Abduction (0-180 ) 144 90 (+)       Shoulder Extension (0-60 )         Shoulder ER (0-90 ) 46 (+) 35 (+)       Shoulder IR (0-70 ) 78 69 (+)       Elbow Flexion (150 )         Elbow Extension (0 )           Shoulder/Elbow Strength NA today  Date:      Shoulder/Elbow Strength (/5)  Manual Muscle Test (MMT) MMT MMT MMT    Right Left Right Left Right Left   Shoulder Flexion         Supraspinatus         Shoulder Abduction         Shoulder Extension         Shoulder External Rotation         Shoulder Internal Rotation         Elbow Flexion         Elbow Extension         Other:         Other:           Shoulder Special Tests    NA today  Impingement Cluster Right (+/-) Left (+/-) Rotator Cuff Tests Right (+/-) Left (+/-)   Farris-Vishal   Drop Arm Sign     Painful Arc   Hornblowers     Infraspinatus Test   ERLS     AC Tests Right (+/-) Left (+/-) IRLS     Active Compression   Labral Tests Right (+/-) Left (+/-)   Crossbody Adduction   Biceps Load Test II     AC Resisted Extension   Jerk Test     GH Instability Tests Right (+/-) Left (+/-) Kelli Test     Sulcus Sign    "Biceps Tests Right (+/-) Left (+/-)   Apprehension   Speed     Relocation   Naty olmedo     Other:   Other:     Other:   Other:       Flexibility:     Palpation: Mod/major tenderness of (B) ant humeral head, (L) infraspinatus,     Joint Assessment: NA today    Treatment Today    Eval limited by  service difficulties, deferred some objective measures to follow up  TREATMENT MINUTES COMMENTS   Evaluation 40 Plan of care and goals developed in collaboration with patient.   Discussed findings/condition, related anatomy.   Self-care/ Home management     Manual therapy     Neuromuscular Re-education     Therapeutic Activity     Therapeutic Exercises 10 Exercises per flow sheet.   Exercises:  Exercise #1: passive table flexion  Comment #1: 10 x 5-10\"  Exercise #2: passive table ER  Comment #2: 10 x 5-10\"  Exercise #3: shoulder abd w/wand  Comment #3: 10 x 5-10\"     Gait training     Modality__________________                Total 50    Blank areas are intentional and mean the treatment did not include these items.     PT Evaluation Code: (Please list factors)  Patient History/Comorbidities: see prob list  Examination: 2  Clinical Presentation: evolving, getting worse  Clinical Decision Making: low    Patient History/  Comorbidities Examination  (body structures and functions, activity limitations, and/or participation restrictions) Clinical Presentation Clinical Decision Making (Complexity)   No documented Comorbidities or personal factors 1-2 Elements Stable and/or uncomplicated Low   1-2 documented comorbidities or personal factor 3 Elements Evolving clinical presentation with changing characteristics Moderate   3-4 documented comorbidities or personal factors 4 or more Unstable and unpredictable High                Jade Palomino PT  1/20/2021                   "

## 2021-06-14 NOTE — PROGRESS NOTES
[facility] Daily Progress     Patient Name: Razia Dos Santos  Date: 2021  Visit #:   PTA visit #:  1  Referral Diagnosis: Adhesive capsulitis of left shoulder  Referring provider: Queta Clark MD  Visit Diagnosis:     ICD-10-CM    1. Pain of both shoulder joints  M25.511     M25.512    2. Shoulder joint stiffness, bilateral  M25.611     M25.612    3. Adhesive capsulitis of left shoulder  M75.02          Assessment:     HEP/POC compliance is  good .  Patient demonstrates understanding/independence with home program.  Response to Intervention good. ROM is improving, but pain is still high.   Patient is appropriate to continue with skilled physical therapy intervention, as indicated by initial plan of care.   ROM has improved significantly since SOC, but remains painful. Localized ant shoulder tenderness suggestive of bursitis.     Goal Status:  Pt. will demonstrate/verbalize independence in self-management of condition in : 12 weeks  Pt. will be independent with home exercise program in : 6 weeks  Pt. will have improved quality of sleep: waking less times/night;in 12 weeks;Comment  Comment:: able to lie on side without shoulder pain    Pt will: be able to don shirt or jacket and don bra without increased pain in 12 weeks.      Plan / Patient Education:     Continue with initial plan of care.  Progress with home program as tolerated.    Subjective:     Pain Ratin-9    available via ipad.   Arm still hurts a lot. Hurts to move the arm. She has been working on the exercises, but they are painful. Hasn't been icing regularly. She removed tape because of the pain.       Objective:     Shoulder/Elbow ROM  Date:      Shoulder and Elbow ROM ( )   AROM in degrees AROM in degrees AROM in degrees    Right Left Right Left Right Left   Shoulder Flexion (0-180 )         Shoulder Abduction (0-180 )         Shoulder Extension (0-60 )         Shoulder ER (0-90 )         Shoulder IR (0-70 )         Shoulder  "IR/EXT         Elbow Flexion (150 )         Elbow Extension (0 )          PROM in degrees PROM in degrees PROM in degrees    Right Left Right Left Right Left   Shoulder Flexion (0-180 )  133 (+)       Shoulder Abduction (0-180 )  121 (+)       Shoulder Extension (0-60 )         Shoulder ER (0-90 )  73 (+)       Shoulder IR (0-70 )         Elbow Flexion (150 )         Elbow Extension (0 )                 Treatment Today     TREATMENT MINUTES COMMENTS   Evaluation     Self-care/ Home management     Manual therapy 20 Induction, indirect, direct techniques utilized as appropriate for optimal tissue release.   MFR/SCS - (L) SBA-LV,   Mobilization - (L) GH distraction in neutral/open pack gr. II-III, inf GH glides in open pack gr. II-III   Neuromuscular Re-education     Therapeutic Activity     Therapeutic Exercises 10 Instructed to stretch gently with home exercises to avoid exacerbations. Instruct to ice for 10 min post exercise.   Gentle passive  short arc ER/IR for bursa inflammation.   Exercises:  Exercise #7: supine wand ER  Comment #7: 10 x 5\"   Gait training     Modality__________________                Total 3-    Blank areas are intentional and mean the treatment did not include these items.       Jade Palomino  2/2/2021    "

## 2021-06-15 NOTE — PROGRESS NOTES
[facility] Daily Progress     Patient Name: Razia Dos Santos  Date: 3/1/2021  Visit #: 12/12+12  PTA visit #:  2  Referral Diagnosis: Adhesive capsulitis of left shoulder  Referring provider: Queta Clark MD  Visit Diagnosis:     ICD-10-CM    1. Pain of both shoulder joints  M25.511     M25.512    2. Shoulder joint stiffness, bilateral  M25.611     M25.612    3. Adhesive capsulitis of left shoulder  M75.02          Assessment:     HEP/POC compliance is  good .  Patient demonstrates understanding/independence with home program.  Response to Intervention good.   Patient is benefitting from skilled physical therapy and is making steady progress toward functional goals.  Patient is appropriate to continue with skilled physical therapy intervention, as indicated by initial plan of care.    Goal Status:  Pt. will demonstrate/verbalize independence in self-management of condition in : 12 weeks;Progressing toward  Pt. will be independent with home exercise program in : 6 weeks;Progressing toward  Pt. will have improved quality of sleep: waking less times/night;in 12 weeks;Comment;Progressing toward  Comment:: able to lie on side without shoulder pain    Pt will: be able to don shirt or jacket and don bra without increased pain in 12 weeks.   status: progressing, still hurts to don/doff jacket      Plan / Patient Education:     Continue with initial plan of care.  Progress with home program as tolerated.  POC has been updated for additional visits. Patient is not sure if she can afford many more visits. Scheduled for 1 more follow up.     Authorization / Certification Number of Visits: [employer] no PA needed 12 visits  Communication with: Referral Source  Patient Related Instruction: Nature of Condition;Treatment plan and rationale;Self Care instruction;Basis of treatment;Posture;Next steps  Times per Week: 1-2  Number of Weeks: up to 12  Number of Visits: 12  Discharge Planning: HEP, self management  Therapeutic  "Exercise: ROM;Stretching;Strengthening  Neuromuscular Reeducation: kinesio tape  Manual Therapy: myofascial release;strain counterstrain;joint mobilization      Subjective:     Pain Ratin-5   available via phone/language line.   \"Better\". Still has pain with reaching behind back, \"but just a little bit\". Pain is still primarily in (L) ant shoulder. Exercises are helping. The new exercise is going okay.     Objective:     Fascial restrictions noted in (L) ant upper thorax , including cervical and clavipectoral fascia.     Treatment Today     TREATMENT MINUTES COMMENTS   Evaluation     Self-care/ Home management     Manual therapy 25 Induction, indirect, direct techniques utilized as appropriate for optimal tissue release.   MFR/SCS - (L) subclavius, (L) clavipectoral fascia, thoracic yoke, (L) DCFA-MS, (R) DCFP-MS   Neuromuscular Re-education     Therapeutic Activity     Therapeutic Exercises     Gait training     Modality__________________                Total 35    Blank areas are intentional and mean the treatment did not include these items.       Jade Palomino  3/1/2021    [facility] Discharge Summary  Patient Name: Razia Dos Santos  Date: 2021  Referral Diagnosis: Adhesive capsulitis of left shoulder  Referring provider: Queta Clark MD  Visit Diagnosis:   1. Pain of both shoulder joints     2. Shoulder joint stiffness, bilateral     3. Adhesive capsulitis of left shoulder         Goals:  Pt. will demonstrate/verbalize independence in self-management of condition in : 12 weeks;Progressing toward  Pt. will be independent with home exercise program in : 6 weeks;Progressing toward  Pt. will have improved quality of sleep: waking less times/night;in 12 weeks;Comment;Progressing toward  Comment:: able to lie on side without shoulder pain    Pt will: be able to don shirt or jacket and don bra without increased pain in 12 weeks.   status: progressing, still hurts to don/doff " jacket      Patient was seen for 12 visits from 1/20/21 to 3/1/21 with 1 missed appointments.  The patient has demonstrated understanding of and independence in the home program for self-care, and progression to next steps.  She will initiate contact if questions or concerns arise.  The patient reports feeling better and did not wish to schedule further therapy at this time.  Patient received a home program for ROM and strength.   No further therapy is required at this time.    Therapy will be discontinued at this time.  The patient will need a new referral to resume.    Thank you for your referral.  Jade Palomino  4/16/2021  8:06 AM

## 2021-06-15 NOTE — PROGRESS NOTES
[facility] Daily Progress     Patient Name: Razia Dos Santos  Date: 2021  Visit #:   PTA visit #:  2  Referral Diagnosis: Adhesive capsulitis of left shoulder  Referring provider: Queta Clark MD  Visit Diagnosis:     ICD-10-CM    1. Pain of both shoulder joints  M25.511     M25.512    2. Shoulder joint stiffness, bilateral  M25.611     M25.612    3. Adhesive capsulitis of left shoulder  M75.02          Assessment:     HEP/POC compliance is  good .  Patient demonstrates understanding/independence with home program.  Response to Intervention good. Pain and ROM are improving.   Patient is benefitting from skilled physical therapy and is making steady progress toward functional goals.  Patient is appropriate to continue with skilled physical therapy intervention, as indicated by initial plan of care.    Goal Status:  Pt. will demonstrate/verbalize independence in self-management of condition in : 12 weeks;Progressing toward  Pt. will be independent with home exercise program in : 6 weeks;Progressing toward  Pt. will have improved quality of sleep: waking less times/night;in 12 weeks;Comment;Progressing toward  Comment:: able to lie on side without shoulder pain    Pt will: be able to don shirt or jacket and don bra without increased pain in 12 weeks.   status: progressing, still hurts to don/doff jacket      Plan / Patient Education:     Continue with initial plan of care.  Progress with home program as tolerated.    Subjective:     Pain Ratin   available via ipad.  Pain is a lot better. Sx improved after the last session. No questions about exercises.       Objective:     Mod/major tenderness of (L) ant humeral head, greater tuberosity.   Mod fascial restrictions of ant shoulder, pectoral and ant upper thoracic fascia.     Treatment Today     TREATMENT MINUTES COMMENTS   Evaluation     Self-care/ Home management     Manual therapy 25 Induction, indirect, direct techniques utilized as  appropriate for optimal tissue release.   MFR/SCS - (L) SBA-LV, (L) HUMAC(P), (L) pec release,   Mobilization - (L) GH distraction/long axis distraction, post glides in neutral/open pack gr. II-III oscill   Neuromuscular Re-education     Therapeutic Activity     Therapeutic Exercises     Gait training     Modality__________________                Total 25    Blank areas are intentional and mean the treatment did not include these items.       Jade Palomino  2/18/2021

## 2021-06-15 NOTE — PROGRESS NOTES
[facility] Daily Progress     Patient Name: Razia Dos Santos  Date: 2021  Visit #: 10/12  PTA visit #:  2  Referral Diagnosis: Adhesive capsulitis of left shoulder  Referring provider: Queta Clark MD  Visit Diagnosis:     ICD-10-CM    1. Pain of both shoulder joints  M25.511     M25.512    2. Shoulder joint stiffness, bilateral  M25.611     M25.612    3. Adhesive capsulitis of left shoulder  M75.02          Assessment:     HEP/POC compliance is  good .  Patient demonstrates understanding/independence with home program.  Response to Intervention good. ROM has improved, but pain level remains fairly high.   Patient is benefitting from skilled physical therapy and is making steady progress toward functional goals.  Patient is appropriate to continue with skilled physical therapy intervention, as indicated by initial plan of care.    Goal Status:  Pt. will demonstrate/verbalize independence in self-management of condition in : 12 weeks;Progressing toward  Pt. will be independent with home exercise program in : 6 weeks;Progressing toward  Pt. will have improved quality of sleep: waking less times/night;in 12 weeks;Comment;Progressing toward  Comment:: able to lie on side without shoulder pain    Pt will: be able to don shirt or jacket and don bra without increased pain in 12 weeks.   status: progressing, still hurts to don/doff jacket      Plan / Patient Education:     Continue with initial plan of care.  Progress with home program as tolerated.    Subjective:     Pain Ratin   available via ipad  (L) shoulder is still painful. Pain is a little better than last week. Some pain in (R) prox arm. Still icing at home and doing the exercises.       Objective:     Mod tenderness of (L) thoracic inlet. Mod/major tenderness of (L) biceps tendon, greater tuberosity. No tenderness of supraspinatus, infraspinatus.   Mod fascial restrictions of thoracic inlet, pectorals, subclavius.     Treatment Today      TREATMENT MINUTES COMMENTS   Evaluation     Self-care/ Home management     Manual therapy 53 Induction, indirect, direct techniques utilized as appropriate for optimal tissue release.   MFR/SCS - (L) DCFA-MS, (L) DCFP-MS, (L) BPA-N, CFM to (L) biceps tendon, (L) pec release, (L) subclavius, thoracic inlet, (L) LFT2-4-MS,   Mobilization - (L) GH distraction/long axis distraction, inf and post glides in neutral/open pack gr. II-III oscill   Neuromuscular Re-education     Therapeutic Activity     Therapeutic Exercises < 5 nc Exercises per flow sheet.   Added ER w/band  Exercises:  Exercise #10: (B) shoulder ER w/band   Comment #10: 10 x yellow       Gait training     Modality__________________                Total 55    Blank areas are intentional and mean the treatment did not include these items.       Jade Palomino  2/23/2021

## 2021-06-15 NOTE — PROGRESS NOTES
[facility] Daily Progress     Patient Name: Razia Dos Santos  Date: 2021  Visit #:   PTA visit #:  1  Referral Diagnosis: Adhesive capsulitis of left shoulder  Referring provider: Queta Clark MD  Visit Diagnosis:     ICD-10-CM    1. Pain of both shoulder joints  M25.511     M25.512    2. Shoulder joint stiffness, bilateral  M25.611     M25.612    3. Adhesive capsulitis of left shoulder  M75.02          Assessment:     HEP/POC compliance is  good .  Patient demonstrates understanding/independence with home program.  Response to Intervention fair to good. ROM is better, but pain level is still high.   Patient is benefitting from skilled physical therapy and is making steady progress toward functional goals.  Patient is appropriate to continue with skilled physical therapy intervention, as indicated by initial plan of care.    Goal Status:  Pt. will demonstrate/verbalize independence in self-management of condition in : 12 weeks  Pt. will be independent with home exercise program in : 6 weeks  Pt. will have improved quality of sleep: waking less times/night;in 12 weeks;Comment  Comment:: able to lie on side without shoulder pain    Pt will: be able to don shirt or jacket and don bra without increased pain in 12 weeks.      Plan / Patient Education:     Continue with initial plan of care.  Progress with home program as tolerated.    Subjective:     Pain Ratin   available via ipad.  Shoulder is still hurting a lot. She has been icing the shoulder for 10 min after exercising and for pain. Not taking any pain medication.     Objective:     Mod tenderness of (L) ant humeral head and (L) supraspinatus.     Treatment Today     TREATMENT MINUTES COMMENTS   Evaluation     Self-care/ Home management     Manual therapy 23 Induction, indirect, direct techniques utilized as appropriate for optimal tissue release.   MFR/SCS - (L) SBA-LV, (L) supraspinatus  Mobilization - (L) GH distraction/long axis  distraction in neutral/open pack gr. II-III, inf GH glides in open pack gr. II-III   Neuromuscular Re-education     Therapeutic Activity     Therapeutic Exercises 5 Gentle passive  short arc ER/IR for bursa inflammation.    Gait training     Modality__________________                Total 28    Blank areas are intentional and mean the treatment did not include these items.       Jade Palomino  2/4/2021

## 2021-06-15 NOTE — PROGRESS NOTES
[facility] Daily Progress     Patient Name: Razia Dos Santos  Date: 2021  Visit #:   PTA visit #:  2  Referral Diagnosis: Adhesive capsulitis of left shoulder  Referring provider: Queta Clark MD  Visit Diagnosis:     ICD-10-CM    1. Pain of both shoulder joints  M25.511     M25.512    2. Shoulder joint stiffness, bilateral  M25.611     M25.612    3. Adhesive capsulitis of left shoulder  M75.02          Assessment:     HEP/POC compliance is  good .  Patient demonstrates understanding/independence with home program.  Response to Intervention good. ROM has improved, but pain level remains in the moderate range.   Patient is benefitting from skilled physical therapy and is making steady progress toward functional goals.  Patient is appropriate to continue with skilled physical therapy intervention, as indicated by initial plan of care.     Goal Status:  Pt. will demonstrate/verbalize independence in self-management of condition in : 12 weeks;Progressing toward  Pt. will be independent with home exercise program in : 6 weeks;Progressing toward  Pt. will have improved quality of sleep: waking less times/night;in 12 weeks;Comment;Progressing toward  Comment:: able to lie on side without shoulder pain    Pt will: be able to don shirt or jacket and don bra without increased pain in 12 weeks.   status: progressing, still hurts to don/doff jacket      Plan / Patient Education:     Continue with initial plan of care.  Progress with home program as tolerated.  Will need updated POC next visit.    Subjective:     Pain Ratin   available via ipad.  Shoulder is feeling better. Some improvement since the last session. Primary complaint is (L) shoulder and prox arm pain. Pain with movement, I.e. donning shirt. Unable to lie on (L) side at night.     Objective:     Initial cranial scan is (+) for (L) thoracic LF, shoulder superficial fascia.   Tender pts noted (L) ant/post shoulder complex.      Shoulder/Elbow ROM  Date:      Shoulder and Elbow ROM ( )   AROM in degrees AROM in degrees AROM in degrees    Right Left Right Left Right Left   Shoulder Flexion (0-180 )  145 (-)       Shoulder Abduction (0-180 )  140 ERP       Shoulder Extension (0-60 )         Shoulder ER (0-90 )         Shoulder IR (0-70 )         Shoulder IR/EXT  To L4-5 (+)       Elbow Flexion (150 )         Elbow Extension (0 )          PROM in degrees PROM in degrees PROM in degrees    Right Left Right Left Right Left   Shoulder Flexion (0-180 )         Shoulder Abduction (0-180 )         Shoulder Extension (0-60 )         Shoulder ER (0-90 )         Shoulder IR (0-70 )         Elbow Flexion (150 )         Elbow Extension (0 )                 Treatment Today     TREATMENT MINUTES COMMENTS   Evaluation     Self-care/ Home management     Manual therapy 25 Induction, indirect, direct techniques utilized as appropriate for optimal tissue release.   MFR/SCS - (L) LFT2-3-MS, (L) DELT-SF, (L) LTRAP-SF, (L) BPM-SF, (L) BPL-SF,   Mobilization - (L) GH inf and post glides in neutral/open pack gr. II-III oscill   Neuromuscular Re-education     Therapeutic Activity     Therapeutic Exercises     Gait training     Modality__________________                Total 25    Blank areas are intentional and mean the treatment did not include these items.       Jade Palomino  2/25/2021

## 2021-06-15 NOTE — PROGRESS NOTES
[facility] Daily Progress     Patient Name: Razia Dos Santos  Date: 2021  Visit #:   PTA visit #:  2  Referral Diagnosis: Adhesive capsulitis of left shoulder  Referring provider: Queta Clark MD  Visit Diagnosis:     ICD-10-CM    1. Pain of both shoulder joints  M25.511     M25.512    2. Shoulder joint stiffness, bilateral  M25.611     M25.612    3. Adhesive capsulitis of left shoulder  M75.02          Assessment:     HEP/POC compliance is  good .  Patient demonstrates understanding/independence with home program.  Response to Intervention fair to good. ROM is better, but pain level is still high.   Patient is benefitting from skilled physical therapy and is making steady progress toward functional goals.  Patient is appropriate to continue with skilled physical therapy intervention, as indicated by initial plan of care.   States sleeping is OK still not lying on left side  Reports some decrease in pain      Goal Status: Progressing towards  Pt. will demonstrate/verbalize independence in self-management of condition in : 12 weeks  Pt. will be independent with home exercise program in : 6 weeks  Pt. will have improved quality of sleep: waking less times/night;in 12 weeks;Comment  Comment:: able to lie on side without shoulder pain    Pt will: be able to don shirt or jacket and don bra without increased pain in 12 weeks.      Plan / Patient Education:     Continue with initial plan of care.  Progress with home program as tolerated.    Subjective:     Pain Ratin/10 with movement , states sleeping OK  Pt feelings she can move her arm more   available via ipad.  Shoulder is still hurting a lot. She has been icing the shoulder for 10 min after exercising and for pain.  Not taking any pain medication.     Objective:   Reviewed HEP cues with stretches needed  Mod tenderness of (L) ant humeral head and (L) supraspinatus  Applied KT to left shoulder for pain precautions discussed.     Treatment  Today     TREATMENT MINUTES COMMENTS   Evaluation     Self-care/ Home management     Manual therapy 10 Induction, indirect, direct techniques utilized as appropriate for optimal tissue release.   MFR supraspinatus  Mobilization - (L) GH distraction/long axis distraction in neutral/open pack gr. II-III, inf GH glides in open pack gr. II-III   Neuromuscular Re-education     Therapeutic Activity     Therapeutic Exercises 15 Gentle passive  short arc ER/IR for bursa inflammation.   Reviewed HEP  Applied KT to left shoulder reviewed precautions   Gait training     Modality__________________                Total 25    Blank areas are intentional and mean the treatment did not include these items.       Aaliyah Carter PTA  2/9/2021

## 2021-06-15 NOTE — PROGRESS NOTES
[facility] Daily Progress     Patient Name: Razia Dos Santos  Date: 2021  Visit #:   PTA visit #:  2  Referral Diagnosis: Adhesive capsulitis of left shoulder  Referring provider: Queta Clark MD  Visit Diagnosis:     ICD-10-CM    1. Pain of both shoulder joints  M25.511     M25.512    2. Shoulder joint stiffness, bilateral  M25.611     M25.612    3. Adhesive capsulitis of left shoulder  M75.02          Assessment:     HEP/POC compliance is  good .  Patient demonstrates understanding/independence with home program.  Response to Intervention fair to good. ROM is better, but pain level is still high.  Patient is benefitting from skilled physical therapy and is making steady progress toward functional goals.  Patient is appropriate to continue with skilled physical therapy intervention, as indicated by initial plan of care.    Goal Status:  Pt. will demonstrate/verbalize independence in self-management of condition in : 12 weeks;Progressing toward  Pt. will be independent with home exercise program in : 6 weeks;Progressing toward  Pt. will have improved quality of sleep: waking less times/night;in 12 weeks;Comment;Progressing toward  Comment:: able to lie on side without shoulder pain    Pt will: be able to don shirt or jacket and don bra without increased pain in 12 weeks.   status: progressing, still hurts to don/doff jacket      Plan / Patient Education:     Continue with initial plan of care.  Progress with home program as tolerated.    Subjective:     Pain Ratin-8   available via ipad.  Doing okay. Still having pain. Pain is the same. Some of the exercises and certain movements are painful. (L) ant shoulder tenderness.     Objective:     Initial cranial scan is (+) for (L) thoracic LF,   Tenderness of (L) ant humeral head, lat coracoid.       Shoulder/Elbow ROM  Date:      Shoulder and Elbow ROM ( )   AROM in degrees AROM in degrees AROM in degrees    Right Left Right Left Right Left  "  Shoulder Flexion (0-180 )         Shoulder Abduction (0-180 )         Shoulder Extension (0-60 )         Shoulder ER (0-90 )         Shoulder IR (0-70 )         Shoulder IR/EXT         Elbow Flexion (150 )         Elbow Extension (0 )          PROM in degrees PROM in degrees PROM in degrees    Right Left Right Left Right Left   Shoulder Flexion (0-180 )  150       Shoulder Abduction (0-180 )         Shoulder Extension (0-60 )         Shoulder ER (0-90 )  70 ERP       Shoulder IR (0-70 )         Elbow Flexion (150 )         Elbow Extension (0 )                 Treatment Today     TREATMENT MINUTES COMMENTS   Evaluation     Self-care/ Home management     Manual therapy 40 Induction, indirect, direct techniques utilized as appropriate for optimal tissue release.   MFR/SCS - (L) LFT2-4-MS, thoracic inlet, (L) pec release, (L) SCAPAD(P), (L) HUMAC(P),   Mobilization - (L) GH distraction/long axis distraction in neutral/open pack gr. I-II oscill   Neuromuscular Re-education     Therapeutic Activity     Therapeutic Exercises 15 Gentle passive  short arc ER/IR for bursa inflammation.   Added scap stabilization exercises.   Recommended gentle stretching only with HEP to avoid flare ups.     Exercises:  Exercise #6: scapular retraction   Comment #6: 10 x 5\"  Exercise #8: rows  Comment #8: 10 x yellow  Exercise #9: shoulder ext  Comment #9: 10 x yellow       Gait training     Modality__________________                Total 55    Blank areas are intentional and mean the treatment did not include these items.       Jade Palomino  2/11/2021    "

## 2021-06-15 NOTE — PROGRESS NOTES
[facility] Daily Progress     Patient Name: Razia Dos Santos  Date: 2021  Visit #:   PTA visit #:  2  Referral Diagnosis: Adhesive capsulitis of left shoulder  Referring provider: Queta Clark MD  Visit Diagnosis:     ICD-10-CM    1. Pain of both shoulder joints  M25.511     M25.512    2. Shoulder joint stiffness, bilateral  M25.611     M25.612    3. Adhesive capsulitis of left shoulder  M75.02          Assessment:     HEP/POC compliance is  good .  Patient demonstrates understanding/independence with home program.  Response to Intervention good. ROM has improved and pain is starting to decrease.   Patient is benefitting from skilled physical therapy and is making steady progress toward functional goals.  Patient is appropriate to continue with skilled physical therapy intervention, as indicated by initial plan of care.    Goal Status:  Pt. will demonstrate/verbalize independence in self-management of condition in : 12 weeks;Progressing toward  Pt. will be independent with home exercise program in : 6 weeks;Progressing toward  Pt. will have improved quality of sleep: waking less times/night;in 12 weeks;Comment;Progressing toward  Comment:: able to lie on side without shoulder pain    Pt will: be able to don shirt or jacket and don bra without increased pain in 12 weeks.   status: progressing, still hurts to don/doff jacket      Plan / Patient Education:     Continue with initial plan of care.  Progress with home program as tolerated.    Subjective:     Pain Ratin   available via ipad.  Better, but still having pain. Raising arm is easier than before. Sleeping okay, but avoiding (L) SL position, which she prefers.       Objective:      Tenderness of (L) ant humeral head, med coracoid.     Shoulder/Elbow ROM  Date:      Shoulder and Elbow ROM ( )   AROM in degrees AROM in degrees AROM in degrees    Right Left Right Left Right Left   Shoulder Flexion (0-180 )  141 (+)       Shoulder  Abduction (0-180 )  148 (+)       Shoulder Extension (0-60 )         Shoulder ER (0-90 )         Shoulder IR (0-70 )         Shoulder IR/EXT         Elbow Flexion (150 )         Elbow Extension (0 )          PROM in degrees PROM in degrees PROM in degrees    Right Left Right Left Right Left   Shoulder Flexion (0-180 )         Shoulder Abduction (0-180 )         Shoulder Extension (0-60 )         Shoulder ER (0-90 )         Shoulder IR (0-70 )         Elbow Flexion (150 )         Elbow Extension (0 )                 Treatment Today     TREATMENT MINUTES COMMENTS   Evaluation     Self-care/ Home management     Manual therapy 15 Induction, indirect, direct techniques utilized as appropriate for optimal tissue release.   MFR/SCS - (L) LFT2-4-MS, thoracic inlet, (L) pec release, (L) SCAPAB(P), (L) HUMAC(P),   Mobilization - (L) GH distraction/long axis distraction in neutral/open pack gr. I-II oscill   Neuromuscular Re-education     Therapeutic Activity     Therapeutic Exercises 10 Exercises per flow sheet.   Hold on supine flexion ROM exercises.   Exercises:  Comment #5: standing wand flexion AAROM 10 x   Exercise #8: rows  Comment #8: 10 x yellow  Exercise #9: shoulder ext  Comment #9: 10 x yellow       Gait training     Modality__________________                Total 25    Blank areas are intentional and mean the treatment did not include these items.       Jade Palomino  2/16/2021

## 2021-06-18 NOTE — PATIENT INSTRUCTIONS - HE
Patient Instructions by Jade Palomino PT at 2/16/2021 11:00 AM     Author: Jade Palomino PT Service: -- Author Type: Physical Therapist    Filed: 2/16/2021 11:34 AM Encounter Date: 2/16/2021 Status: Signed    : Jade Palomino PT (Physical Therapist)           AAROM SHOULDER FLEXION - WAND    In the standing position and holding  wand/cane with both arms as shown, raise it up allowing your unaffected arm to push up your affected arm.    10 times for 5 seconds

## 2021-06-18 NOTE — PATIENT INSTRUCTIONS - HE
Patient Instructions by Jade Palomino PT at 2/11/2021  8:00 AM     Author: Jade Palomino PT Service: -- Author Type: Physical Therapist    Filed: 2/11/2021  8:55 AM Encounter Date: 2/11/2021 Status: Signed    : Jade Palomino PT (Physical Therapist)        SCAPULAR RETRACTIONS    Draw your shoulder blades back and down.    ELASTIC BAND ROWS     Holding elastic band with both hands, draw back the band as you bend your elbows. Keep your elbows near the side of your body.               Shoulder extension with elastic band    Hook elastic band behind door or heavy object.   Begin with arms bent in front of you holding band, and pull back your arms until elbows straighten and shoulders are back.

## 2021-06-18 NOTE — PATIENT INSTRUCTIONS - HE
Patient Instructions by Jade Palomino PT at 1/20/2021  9:30 AM     Author: Jade Palomino PT Service: -- Author Type: Physical Therapist    Filed: 1/20/2021 10:42 AM Encounter Date: 1/20/2021 Status: Signed    : Jade Palomino PT (Physical Therapist)        TABLE SLIDE - FLEXION    Sitting in a chair, rest your injured arm on a table and gently slide it forward and then back.    TABLE SLIDE - ABDUCTION    Sitting in a chair, rest your injured arm on a table and gently slide it out to the side and then back.     .xshoulder

## 2021-06-18 NOTE — PATIENT INSTRUCTIONS - HE
Patient Instructions by Belem Ludwig PT at 1/25/2021 10:00 AM     Author: Belem Ludwig PT Service: -- Author Type: Physical Therapist    Filed: 1/25/2021 10:21 AM Encounter Date: 1/25/2021 Status: Signed    : Belem Ludwig PT (Physical Therapist)        WALL Slide     Place your affected hand on the wall with the palm facing the wall. Next, slide your fingers up the wall towards overhead. Lastly, slide your hand back down the wall to the starting position.    x10

## 2021-06-28 ENCOUNTER — OFFICE VISIT (OUTPATIENT)
Dept: FAMILY MEDICINE | Facility: CLINIC | Age: 66
End: 2021-06-28
Payer: COMMERCIAL

## 2021-06-28 VITALS
WEIGHT: 142.6 LBS | TEMPERATURE: 98.4 F | HEART RATE: 92 BPM | OXYGEN SATURATION: 99 % | SYSTOLIC BLOOD PRESSURE: 136 MMHG | RESPIRATION RATE: 16 BRPM | DIASTOLIC BLOOD PRESSURE: 84 MMHG | BODY MASS INDEX: 27.13 KG/M2

## 2021-06-28 DIAGNOSIS — Z01.818 PREOP GENERAL PHYSICAL EXAM: Primary | ICD-10-CM

## 2021-06-28 DIAGNOSIS — Z20.822 ENCOUNTER FOR LABORATORY TESTING FOR COVID-19 VIRUS: ICD-10-CM

## 2021-06-28 DIAGNOSIS — I10 BENIGN ESSENTIAL HYPERTENSION: ICD-10-CM

## 2021-06-28 PROCEDURE — 99213 OFFICE O/P EST LOW 20 MIN: CPT | Mod: GC | Performed by: STUDENT IN AN ORGANIZED HEALTH CARE EDUCATION/TRAINING PROGRAM

## 2021-06-28 RX ORDER — LISINOPRIL 10 MG/1
10 TABLET ORAL DAILY
Qty: 90 TABLET | Refills: 3 | Status: SHIPPED | OUTPATIENT
Start: 2021-06-28 | End: 2022-01-05

## 2021-06-28 RX ORDER — SIMVASTATIN 20 MG
20 TABLET ORAL AT BEDTIME
Qty: 90 TABLET | Refills: 3 | Status: SHIPPED | OUTPATIENT
Start: 2021-06-28 | End: 2022-01-05

## 2021-06-28 RX ORDER — LISINOPRIL 5 MG/1
5 TABLET ORAL DAILY
Qty: 60 TABLET | Refills: 3 | Status: SHIPPED | OUTPATIENT
Start: 2021-06-28 | End: 2022-01-05

## 2021-06-28 NOTE — PATIENT INSTRUCTIONS

## 2021-06-28 NOTE — PROGRESS NOTES
[facility] [address] [place] [address]  Phone: [phone]  Fax: [phone]  Primary Provider: Queta Clark  {FV AMB Performing Provider (Optional):324316}    {Provider  Link to PREOP SmartSet  Use this to apply standard patient instructions to AVS; includes medication directions, common orders, guidelines for anemia, warfarin, additional testing   :621888}  PREOPERATIVE EVALUATION:  Today's date: 6/28/2021    Razia Dos Santos is a 65 year old female who presents for a preoperative evaluation.    Surgical Information:  Surgery/Procedure: ***  Surgery Location: ***  Surgeon: ***  Surgery Date: ***  Time of Surgery: ***  Where patient plans to recover: {Preop post recovery plans :759562}  Fax number for surgical facility: {SURGERY FAX NUMBER:[id number]}    Type of Anesthesia Anticipated: {ANESTHESIA:513776}    {2021 Provider Charting Preference for Preop :377168}    Subjective     HPI related to upcoming procedure: ***    Preop Questions 6/28/2021   1. Have you ever had a heart attack or stroke? No   2. Have you ever had surgery on your heart or blood vessels, such as a stent placement, a coronary artery bypass, or surgery on an artery in your head, neck, heart, or legs? No   3. Do you have chest pain with activity? No   4. Do you have a history of  heart failure? No   5. Do you currently have a cold, bronchitis or symptoms of other infection? No   6. Do you have a cough, shortness of breath, or wheezing? YES - ***   7. Do you or anyone in your family have previous history of blood clots? No   8. Do you or does anyone in your family have a serious bleeding problem such as prolonged bleeding following surgeries or cuts? No   9. Have you ever had problems with anemia or been told to take iron pills? No   10. Have you had any abnormal blood loss such as black, tarry or bloody stools, or abnormal vaginal bleeding? No   11. Have you ever had a blood transfusion? No   12. Are you willing to  have a blood transfusion if it is medically needed before, during, or after your surgery? Yes   13. Have you or any of your relatives ever had problems with anesthesia? UNKNOWN - ***   14. Do you have sleep apnea, excessive snoring or daytime drowsiness? No   15. Do you have any artifical heart valves or other implanted medical devices like a pacemaker, defibrillator, or continuous glucose monitor? No   16. Do you have artificial joints? No   17. Are you allergic to latex? No       Health Care Directive:  Patient does not have a Health Care Directive or Living Will: {ADVANCE_DIRECTIVE_STATUS:992176}    Preoperative Review of :  {Mnpmpreport:586312}  {Review MNPMP for all patients per ICSI MNPMP Profile:796308}    {Chronic problem details (Optional) :049378}    Review of Systems  {ROS Preop Choices:849096}    Patient Active Problem List    Diagnosis Date Noted     Benign paroxysmal positional vertigo, unspecified laterality 03/11/2021     Priority: Medium     Breast cancer screening 03/28/2018     Priority: Medium     1/15/19: Birads 2 - heterogenously dense breasts. Recommend routine screening       Pre-diabetes 03/13/2017     Priority: Medium     Chronic rhinitis 03/13/2017     Priority: Medium     Benign essential hypertension 10/16/2015     Priority: Medium     Glaucoma 06/26/2014     Priority: Medium     Osteopenia 08/30/2013     Priority: Medium     DEXA scan performed on 8/14/13 showed osteopenia.   Lumbar spine T score: 0.3; Z score 1.4  Left proximal femur T score -0.9; Z score 0  Left femoral neck T score -1.8; Z score -0.7  Total right proximal femur T score -0.9; Z score 0  Right femoral neck T score -1.8; Z score -0.6  DEXA 2/7/18: L1-L4 T0.6, L femoral neck T 2.2, right femoral neck T -2.1. Major osteoporotic fx risk 11.9%. Recommend FU in 2 years with calcium, vitamin D, weight bearing exercise.        Abnormal Pap smear of cervix      Priority: Medium       11/9/2018 Normal pap and negative HPV.  Can consider discontinuing screening with 3 consecutive normals  7/9/15 nl Pap, pt needs repeat Pap in 3 yrs.  6/26/14 nl Pap with negative HPV, pt needs repeat Pap in 1 yr.  6/4/13 COLP APPT: repeat Pap nl, positive low risk HPV 71.  Pt had nl colposcopic exam and will need repeat Pap in 1yr  3/2013:  Normal pap, positive for low risk HPV and cervical polyp seen  1/6/12 nl Pap with +unknown risk HPV and cervical polyp seen    9/26/08 nl Pap  7/26/06 nl Pap  1/6/06 nl Pap  7/19/05 COLP: 8:00 cervical bx- LUIS 1 and ECC- neg dysplasia  5/13/04 COLP: Repeat Pap- nl and ECC- neg dysplasia  6/13/02 COLP  5/17/02 ASCUS  2/28/02 ASCUS  11/17/99 COLP: atypia       Pure hypercholesterolemia 02/19/2013     Priority: Medium     Insomnia 02/19/2013     Priority: Medium     Problem list name updated by automated process. Provider to review       Nonspecific reaction to tuberculin skin test without active tuberculosis 02/19/2013     Priority: Medium     Problem list name updated by automated process. Provider to review and confirm  Problem list name updated by automated process. Provider to review       Migraine 02/19/2013     Priority: Medium     Problem list name updated by automated process. Provider to review        Past Medical History:   Diagnosis Date     HPV (low risk) 3/14/2013    3/2013:  Normal pap, positive for low risk HPV.   H/O ASCUS in 2005, +HPV of undetermined risk in 2012 No history of high grade lesion     Hypertension      History reviewed. No pertinent surgical history.  Current Outpatient Medications   Medication Sig Dispense Refill     calcium 600-200 MG-UNIT TABS Take  by mouth. Take 2 tablet daily       latanoprost (XALATAN) 0.005 % ophthalmic solution 1 drop daily       lisinopril (ZESTRIL) 10 MG tablet Take 1 tablet (10 mg) by mouth daily 90 tablet 3     lisinopril (ZESTRIL) 5 MG tablet Take 1 tablet (5 mg) by mouth daily 60 tablet 3     Multiple Vitamin (MULTIVITAMIN  S) CAPS Take 1 capsule by mouth  "daily Take 1 tablet daily 90 capsule 0     simvastatin (ZOCOR) 20 MG tablet TAKE 1 TABLET(20 MG) BY MOUTH AT BEDTIME 90 tablet 3     VITAMIN D3 25 MCG (1000 UT) tablet TAKE 1 TABLET BY MOUTH DAILY 100 tablet 3     Ibuprofen (ADVIL PO)        naproxen (NAPROSYN) 500 MG tablet TAKE 1 TABLET(500 MG) BY MOUTH TWICE DAILY WITH MEALS (Patient not taking: Reported on 2021) 60 tablet 0     SHINGRIX injection ADM 0.5ML IM UTD       triamcinolone (KENALOG) 0.025 % external ointment Apply topically 2 times daily (Patient not taking: Reported on 2021) 80 g 0       Allergies   Allergen Reactions     Estrace [Estradiol Cypionate]      Blurred vision, cramping        Social History     Tobacco Use     Smoking status: Never Smoker     Smokeless tobacco: Never Used   Substance Use Topics     Alcohol use: No     {FAMILY HISTORY (Optional):339764010}  History   Drug Use No         Objective     /84 (BP Location: Left arm, Patient Position: Sitting, Cuff Size: Adult Regular)   Pulse 92   Temp 98.4  F (36.9  C) (Oral)   Resp 16   Wt 64.7 kg (142 lb 9.6 oz)   SpO2 99%   BMI 27.13 kg/m      Physical Exam  {EXAM Preop Choices:787972}    Recent Labs   Lab Test 21  0903 21  0943 20  1036 19  1345   HGB  --   --  13.6  --      --  143.0*  --    POTASSIUM 4.6  --  4.3  --    CR 0.91  --  0.8  --    A1C  --  5.7  --  5.3        Diagnostics:  {LABS:415971}   {EK}    Revised Cardiac Risk Index (RCRI):  The patient has the following serious cardiovascular risks for perioperative complications:  {PREOP REVISED CARDIAC RISK INDEX (RCRI) :650703::\" - No serious cardiac risks = 0 points\"}     RCRI Interpretation: {REVISED CARDIAC RISK INTERPRETATION :658917}         Signed Electronically by: Queta Clark MD  Copy of this evaluation report is provided to requesting physician.    {Provider Resources  Preop [facility] Preop Guidelines  Revised Cardiac Risk Index :681576}  "

## 2021-06-28 NOTE — NURSING NOTE
Due to patient being non-English speaking/uses sign language, an  was used for this visit. Only for face-to-face interpretation by an external agency, date and length of interpretation can be found on the scanned worksheet.     name: Kelli  Agency: [employer]  Language: Kinyarwanda   Telephone number: [phone]  Type of interpretation: Face-to-face, spoken

## 2021-06-28 NOTE — PROGRESS NOTES
[facility] [address] [place] [address]  Phone: [phone]  Fax: [phone]  Primary Provider: Queta Clark  {FV AMB Performing Provider (Optional):226992}    {Provider  Link to PREOP SmartSet  Use this to apply standard patient instructions to AVS; includes medication directions, common orders, guidelines for anemia, warfarin, additional testing   :275230}  PREOPERATIVE EVALUATION:  Today's date: 6/28/2021    Razia Dos Santos is a 65 year old female who presents for a preoperative evaluation.    Surgical Information:  Surgery/Procedure: ***  Surgery Location: ***  Surgeon: ***  Surgery Date: ***  Time of Surgery: ***  Where patient plans to recover: {Preop post recovery plans :479730}  Fax number for surgical facility: {SURGERY FAX NUMBER:[id number]}    Type of Anesthesia Anticipated: {ANESTHESIA:677298}    {2021 Provider Charting Preference for Preop :668302}    Subjective     HPI related to upcoming procedure: ***    {Click here to pull in Questionnaire Data after Qnr completed :738142}  Health Care Directive:  Patient does not have a Health Care Directive or Living Will: {ADVANCE_DIRECTIVE_STATUS:788881}    Preoperative Review of :  {Mnpmpreport:265349}  {Review MNPMP for all patients per [facility]CSI MNPMP Profile:390879}    {Chronic problem details (Optional) :895834}    Review of Systems  {ROS Preop Choices:563742}    Patient Active Problem List    Diagnosis Date Noted     Benign paroxysmal positional vertigo, unspecified laterality 03/11/2021     Priority: Medium     Breast cancer screening 03/28/2018     Priority: Medium     1/15/19: Birads 2 - heterogenously dense breasts. Recommend routine screening       Pre-diabetes 03/13/2017     Priority: Medium     Chronic rhinitis 03/13/2017     Priority: Medium     Benign essential hypertension 10/16/2015     Priority: Medium     Glaucoma 06/26/2014     Priority: Medium     Osteopenia 08/30/2013     Priority: Medium     DEXA scan  performed on 8/14/13 showed osteopenia.   Lumbar spine T score: 0.3; Z score 1.4  Left proximal femur T score -0.9; Z score 0  Left femoral neck T score -1.8; Z score -0.7  Total right proximal femur T score -0.9; Z score 0  Right femoral neck T score -1.8; Z score -0.6  DEXA 2/7/18: L1-L4 T0.6, L femoral neck T 2.2, right femoral neck T -2.1. Major osteoporotic fx risk 11.9%. Recommend FU in 2 years with calcium, vitamin D, weight bearing exercise.        Abnormal Pap smear of cervix      Priority: Medium       11/9/2018 Normal pap and negative HPV. Can consider discontinuing screening with 3 consecutive normals  7/9/15 nl Pap, pt needs repeat Pap in 3 yrs.  6/26/14 nl Pap with negative HPV, pt needs repeat Pap in 1 yr.  6/4/13 COLP APPT: repeat Pap nl, positive low risk HPV 71.  Pt had nl colposcopic exam and will need repeat Pap in 1yr  3/2013:  Normal pap, positive for low risk HPV and cervical polyp seen  1/6/12 nl Pap with +unknown risk HPV and cervical polyp seen    9/26/08 nl Pap  7/26/06 nl Pap  1/6/06 nl Pap  7/19/05 COLP: 8:00 cervical bx- LUIS 1 and ECC- neg dysplasia  5/13/04 COLP: Repeat Pap- nl and ECC- neg dysplasia  6/13/02 COLP  5/17/02 ASCUS  2/28/02 ASCUS  11/17/99 COLP: atypia       Pure hypercholesterolemia 02/19/2013     Priority: Medium     Insomnia 02/19/2013     Priority: Medium     Problem list name updated by automated process. Provider to review       Nonspecific reaction to tuberculin skin test without active tuberculosis 02/19/2013     Priority: Medium     Problem list name updated by automated process. Provider to review and confirm  Problem list name updated by automated process. Provider to review       Migraine 02/19/2013     Priority: Medium     Problem list name updated by automated process. Provider to review        Past Medical History:   Diagnosis Date     HPV (low risk) 3/14/2013    3/2013:  Normal pap, positive for low risk HPV.   H/O ASCUS in 2005, +HPV of undetermined risk  in 2012 No history of high grade lesion     Hypertension      History reviewed. No pertinent surgical history.  Current Outpatient Medications   Medication Sig Dispense Refill     calcium 600-200 MG-UNIT TABS Take  by mouth. Take 2 tablet daily       latanoprost (XALATAN) 0.005 % ophthalmic solution 1 drop daily       lisinopril (ZESTRIL) 10 MG tablet Take 1 tablet (10 mg) by mouth daily 90 tablet 3     lisinopril (ZESTRIL) 5 MG tablet Take 1 tablet (5 mg) by mouth daily 60 tablet 3     Multiple Vitamin (MULTIVITAMIN  S) CAPS Take 1 capsule by mouth daily Take 1 tablet daily 90 capsule 0     simvastatin (ZOCOR) 20 MG tablet TAKE 1 TABLET(20 MG) BY MOUTH AT BEDTIME 90 tablet 3     VITAMIN D3 25 MCG (1000 UT) tablet TAKE 1 TABLET BY MOUTH DAILY 100 tablet 3     Ibuprofen (ADVIL PO)        naproxen (NAPROSYN) 500 MG tablet TAKE 1 TABLET(500 MG) BY MOUTH TWICE DAILY WITH MEALS (Patient not taking: Reported on 6/28/2021) 60 tablet 0     SHINGRIX injection ADM 0.5ML IM UTD       triamcinolone (KENALOG) 0.025 % external ointment Apply topically 2 times daily (Patient not taking: Reported on 6/28/2021) 80 g 0       Allergies   Allergen Reactions     Estrace [Estradiol Cypionate]      Blurred vision, cramping        Social History     Tobacco Use     Smoking status: Never Smoker     Smokeless tobacco: Never Used   Substance Use Topics     Alcohol use: No     {FAMILY HISTORY (Optional):364391919}  History   Drug Use No         Objective     /84 (BP Location: Left arm, Patient Position: Sitting, Cuff Size: Adult Regular)   Pulse 92   Temp 98.4  F (36.9  C) (Oral)   Resp 16   Wt 64.7 kg (142 lb 9.6 oz)   SpO2 99%   BMI 27.13 kg/m      Physical Exam  {EXAM Preop Choices:929945}    Recent Labs   Lab Test 03/23/21  0903 02/22/21  0943 08/06/20  1036 12/05/19  1345   HGB  --   --  13.6  --      --  143.0*  --    POTASSIUM 4.6  --  4.3  --    CR 0.91  --  0.8  --    A1C  --  5.7  --  5.3     "    Diagnostics:  {LABS:893826}   {EK}    Revised Cardiac Risk Index (RCRI):  The patient has the following serious cardiovascular risks for perioperative complications:  {PREOP REVISED CARDIAC RISK INDEX (RCRI) :865349::\" - No serious cardiac risks = 0 points\"}     RCRI Interpretation: {REVISED CARDIAC RISK INTERPRETATION :046522}         Signed Electronically by: Queta Clark MD  Copy of this evaluation report is provided to requesting physician.    {Provider Resources  Preop [facility] Preop Guidelines  Revised Cardiac Risk Index :910881}  "

## 2021-06-28 NOTE — PROGRESS NOTES
[facility] [address] [place] [address]  Phone: [phone]  Fax: [phone]  Primary Provider: Queta Clark  Pre-op Performing Provider: QUETA CLARK    PREOPERATIVE EVALUATION:  Today's date: 6/28/2021    Razia Dos Santos is a 65 year old female who presents for a preoperative evaluation.    Surgical Information:  Surgery/Procedure: Left cataract surgery  Surgery Location: [facility], [address] [place] [address]  Surgeon: Dr. Alexis Ridley  Surgery Date: 7/15/21  Time of Surgery: TBD  Where patient plans to recover: At home with family  Fax number for surgical facility: [phone]    Type of Anesthesia Anticipated: Topical/MAC    Assessment & Plan     The proposed surgical procedure is considered LOW risk.    Preop general physical exam  Left cataract surgery on 7/15/21. Patient with a history of well controlled hypertension, otherwise no concerns. Does have reported mild dyspnea with exercise. No concerns on exam today. LOW risk procedure, OKAY to proceed with procedure.   -Recommendations as below.     Patient and plan discussed with attending physician, Dr. Tere Marrero.     Queta Clark MD PGY3  [facility]       Risks and Recommendations:  The patient has the following additional risks and recommendations for perioperative complications:   - No identified additional risk factors other than previously addressed    Medication Instructions:   - ACE/ARB: May be continued on the day of surgery.    - HOLD NSAIDS 5 days prior to surgery    RECOMMENDATION:  APPROVAL GIVEN to proceed with proposed procedure, without further diagnostic evaluation.    Review of external notes as documented above   Independent interpretation of a test performed by another physician/other qualified health care professional (not separately reported) - A1C, lipid panel from 2/22/21.         Subjective     HPI related to upcoming procedure: Left eye cataract.     Preop  Questions 6/28/2021   1. Have you ever had a heart attack or stroke? No   2. Have you ever had surgery on your heart or blood vessels, such as a stent placement, a coronary artery bypass, or surgery on an artery in your head, neck, heart, or legs? No   3. Do you have chest pain with activity? No   4. Do you have a history of  heart failure? No   5. Do you currently have a cold, bronchitis or symptoms of other infection? No   6. Do you have a cough, shortness of breath, or wheezing? YES - When she exerts herself and exercises gets tired.    7. Do you or anyone in your family have previous history of blood clots? No   8. Do you or does anyone in your family have a serious bleeding problem such as prolonged bleeding following surgeries or cuts? No   9. Have you ever had problems with anemia or been told to take iron pills? No   10. Have you had any abnormal blood loss such as black, tarry or bloody stools, or abnormal vaginal bleeding? No   11. Have you ever had a blood transfusion? No   12. Are you willing to have a blood transfusion if it is medically needed before, during, or after your surgery? Yes   13. Have you or any of your relatives ever had problems with anesthesia? UNKNOWN   14. Do you have sleep apnea, excessive snoring or daytime drowsiness? No   15. Do you have any artifical heart valves or other implanted medical devices like a pacemaker, defibrillator, or continuous glucose monitor? No   16. Do you have artificial joints? No   17. Are you allergic to latex? No       Health Care Directive:  Patient does not have a Health Care Directive or Living Will: Discussed advance care planning with patient; however, patient declined at this time.    Preoperative Review of :   reviewed - no record of controlled substances prescribed.    Status of Chronic Conditions:  HYPERTENSION - Patient has longstanding history of HTN , currently denies any symptoms referable to elevated blood pressure. Specifically denies  chest pain, palpitations, dyspnea, orthopnea, PND or peripheral edema. Blood pressure readings have been in normal range. Current medication regimen is as listed below. Patient denies any side effects of medication.       Review of Systems  CONSTITUTIONAL: NEGATIVE for fever, chills, change in weight  ENT/MOUTH: NEGATIVE for ear, mouth and throat problems  RESP: NEGATIVE for significant cough, wheezing. POSITIVE for shortness of breath after exertional activity.   CV: NEGATIVE for chest pain, palpitations or peripheral edema    Patient Active Problem List    Diagnosis Date Noted     Benign paroxysmal positional vertigo, unspecified laterality 03/11/2021     Priority: Medium     Breast cancer screening 03/28/2018     Priority: Medium     1/15/19: Birads 2 - heterogenously dense breasts. Recommend routine screening       Pre-diabetes 03/13/2017     Priority: Medium     Chronic rhinitis 03/13/2017     Priority: Medium     Benign essential hypertension 10/16/2015     Priority: Medium     Glaucoma 06/26/2014     Priority: Medium     Osteopenia 08/30/2013     Priority: Medium     DEXA scan performed on 8/14/13 showed osteopenia.   Lumbar spine T score: 0.3; Z score 1.4  Left proximal femur T score -0.9; Z score 0  Left femoral neck T score -1.8; Z score -0.7  Total right proximal femur T score -0.9; Z score 0  Right femoral neck T score -1.8; Z score -0.6  DEXA 2/7/18: L1-L4 T0.6, L femoral neck T 2.2, right femoral neck T -2.1. Major osteoporotic fx risk 11.9%. Recommend FU in 2 years with calcium, vitamin D, weight bearing exercise.        Abnormal Pap smear of cervix      Priority: Medium       11/9/2018 Normal pap and negative HPV. Can consider discontinuing screening with 3 consecutive normals  7/9/15 nl Pap, pt needs repeat Pap in 3 yrs.  6/26/14 nl Pap with negative HPV, pt needs repeat Pap in 1 yr.  6/4/13 COLP APPT: repeat Pap nl, positive low risk HPV 71.  Pt had nl colposcopic exam and will need repeat Pap in  1yr  3/2013:  Normal pap, positive for low risk HPV and cervical polyp seen  1/6/12 nl Pap with +unknown risk HPV and cervical polyp seen    9/26/08 nl Pap  7/26/06 nl Pap  1/6/06 nl Pap  7/19/05 COLP: 8:00 cervical bx- LUIS 1 and ECC- neg dysplasia  5/13/04 COLP: Repeat Pap- nl and ECC- neg dysplasia  6/13/02 COLP  5/17/02 ASCUS  2/28/02 ASCUS  11/17/99 COLP: atypia       Pure hypercholesterolemia 02/19/2013     Priority: Medium     Insomnia 02/19/2013     Priority: Medium     Problem list name updated by automated process. Provider to review       Nonspecific reaction to tuberculin skin test without active tuberculosis 02/19/2013     Priority: Medium     Problem list name updated by automated process. Provider to review and confirm  Problem list name updated by automated process. Provider to review       Migraine 02/19/2013     Priority: Medium     Problem list name updated by automated process. Provider to review        Past Medical History:   Diagnosis Date     HPV (low risk) 3/14/2013    3/2013:  Normal pap, positive for low risk HPV.   H/O ASCUS in 2005, +HPV of undetermined risk in 2012 No history of high grade lesion     Hypertension      History reviewed. No pertinent surgical history.  Current Outpatient Medications   Medication Sig Dispense Refill     calcium 600-200 MG-UNIT TABS Take  by mouth. Take 2 tablet daily       latanoprost (XALATAN) 0.005 % ophthalmic solution 1 drop daily       lisinopril (ZESTRIL) 10 MG tablet Take 1 tablet (10 mg) by mouth daily 90 tablet 3     lisinopril (ZESTRIL) 5 MG tablet Take 1 tablet (5 mg) by mouth daily 60 tablet 3     Multiple Vitamin (MULTIVITAMIN  S) CAPS Take 1 capsule by mouth daily Take 1 tablet daily 90 capsule 0     simvastatin (ZOCOR) 20 MG tablet TAKE 1 TABLET(20 MG) BY MOUTH AT BEDTIME 90 tablet 3     VITAMIN D3 25 MCG (1000 UT) tablet TAKE 1 TABLET BY MOUTH DAILY 100 tablet 3     Ibuprofen (ADVIL PO)        naproxen (NAPROSYN) 500 MG tablet TAKE 1  TABLET(500 MG) BY MOUTH TWICE DAILY WITH MEALS (Patient not taking: Reported on 6/28/2021) 60 tablet 0     SHINGRIX injection ADM 0.5ML IM UTD       triamcinolone (KENALOG) 0.025 % external ointment Apply topically 2 times daily (Patient not taking: Reported on 6/28/2021) 80 g 0       Allergies   Allergen Reactions     Estrace [Estradiol Cypionate]      Blurred vision, cramping        Social History     Tobacco Use     Smoking status: Never Smoker     Smokeless tobacco: Never Used   Substance Use Topics     Alcohol use: No     Family History   Problem Relation Age of Onset     No Known Problems Mother      No Known Problems Father      No Known Problems Maternal Grandmother      No Known Problems Maternal Grandfather      No Known Problems Paternal Grandmother      No Known Problems Paternal Grandfather      Heart Disease Brother      No Known Problems Sister      No Known Problems Son      No Known Problems Daughter      No Known Problems Maternal Half-Brother      No Known Problems Maternal Half-Sister      No Known Problems Paternal Half-Brother      No Known Problems Paternal Half-Sister      No Known Problems Niece      No Known Problems Nephew      No Known Problems Cousin      No Known Problems Other      Diabetes No family hx of      Coronary Artery Disease No family hx of      Hypertension No family hx of      Hyperlipidemia No family hx of      Breast Cancer No family hx of      Cancer - colorectal No family hx of      Ovarian Cancer No family hx of      Prostate Cancer No family hx of      Other Cancer No family hx of      Mental Illness No family hx of      Cerebrovascular Disease No family hx of      Anesthesia Reaction No family hx of      Asthma No family hx of      Osteoporosis No family hx of      Known Genetic Syndrome No family hx of      Obesity No family hx of      Unknown/Adopted No family hx of      Cancer No family hx of      Kidney Disease No family hx of      Thrombosis No family hx of       Arthritis No family hx of      Thyroid Disease No family hx of      Depression No family hx of      Mental Illness No family hx of      Substance Abuse No family hx of      Cystic Fibrosis No family hx of      Early Death No family hx of      Coronary Artery Disease Early Onset No family hx of      Heart Failure No family hx of      Bleeding Diathesis No family hx of      Dementia No family hx of      Uterine Cancer No family hx of      Colorectal Cancer No family hx of      Pancreatic Cancer No family hx of      Lung Cancer No family hx of      Melanoma No family hx of      Autoimmune Disease No family hx of      Genetic Disorder No family hx of      History   Drug Use No         Objective     /84 (BP Location: Left arm, Patient Position: Sitting, Cuff Size: Adult Regular)   Pulse 92   Temp 98.4  F (36.9  C) (Oral)   Resp 16   Wt 64.7 kg (142 lb 9.6 oz)   SpO2 99%   BMI 27.13 kg/m      Physical Exam  GENERAL APPEARANCE: healthy, alert and no distress  HENT: ear canals and TM's normal and nose and mouth without ulcers or lesions  RESP: lungs clear to auscultation - no rales, rhonchi or wheezes  CV: regular rate and rhythm, normal S1 S2, no S3 or S4 and no murmur, click or rub   ABDOMEN: soft, nontender, no HSM or masses and bowel sounds normal  NEURO: Normal strength and tone, sensory exam grossly normal, mentation intact and speech normal    Recent Labs   Lab Test 03/23/21  0903 02/22/21  0943 08/06/20  1036 12/05/19  1345   HGB  --   --  13.6  --      --  143.0*  --    POTASSIUM 4.6  --  4.3  --    CR 0.91  --  0.8  --    A1C  --  5.7  --  5.3        Diagnostics:  No labs were ordered during this visit.   No EKG required for low risk surgery (cataract, skin procedure, breast biopsy, etc).    Revised Cardiac Risk Index (RCRI):  The patient has the following serious cardiovascular risks for perioperative complications:   - No serious cardiac risks = 0 points     RCRI Interpretation: 0 points: Class  I (very low risk - 0.4% complication rate)      Signed Electronically by: Queta Clark MD  Copy of this evaluation report is provided to requesting physician.

## 2021-07-14 ENCOUNTER — ANCILLARY PROCEDURE (OUTPATIENT)
Dept: GENERAL RADIOLOGY | Facility: CLINIC | Age: 66
End: 2021-07-14
Attending: STUDENT IN AN ORGANIZED HEALTH CARE EDUCATION/TRAINING PROGRAM
Payer: MEDICARE

## 2021-07-14 ENCOUNTER — OFFICE VISIT (OUTPATIENT)
Dept: FAMILY MEDICINE | Facility: CLINIC | Age: 66
End: 2021-07-14
Payer: MEDICARE

## 2021-07-14 VITALS
SYSTOLIC BLOOD PRESSURE: 127 MMHG | TEMPERATURE: 98.3 F | DIASTOLIC BLOOD PRESSURE: 79 MMHG | HEIGHT: 61 IN | OXYGEN SATURATION: 98 % | BODY MASS INDEX: 26.7 KG/M2 | RESPIRATION RATE: 16 BRPM | HEART RATE: 78 BPM | WEIGHT: 141.4 LBS

## 2021-07-14 DIAGNOSIS — Z01.818 PREOPERATIVE EXAMINATION: Primary | ICD-10-CM

## 2021-07-14 PROCEDURE — 99212 OFFICE O/P EST SF 10 MIN: CPT | Mod: GC | Performed by: STUDENT IN AN ORGANIZED HEALTH CARE EDUCATION/TRAINING PROGRAM

## 2021-07-14 PROCEDURE — 71045 X-RAY EXAM CHEST 1 VIEW: CPT | Mod: FY | Performed by: RADIOLOGY

## 2021-07-14 ASSESSMENT — MIFFLIN-ST. JEOR: SCORE: 1114.76

## 2021-07-14 NOTE — LETTER
[facility] [address] [place] [address]  [phone]      July 14, 2021    RE:  Razia Dos Santos                                                                                                                                                       [address]  [place] [address]            To whom it may concern:    Razia Dos Santos is under my professional care for Preoperative examination.   She was examined with a screening chest ray due to history of positive tuberculin skin testing in the past, we will fax results to you later today. We also have record of her receiving 6 months of Isoniazid treatment for latent tuberculosis in 2012 for your information.         Sincerely,        Delfino Quarles MD    [facility]

## 2021-07-14 NOTE — NURSING NOTE
Due to patient being non-English speaking/uses sign language, an  was used for this visit. Only for face-to-face interpretation by an external agency, date and length of interpretation can be found on the scanned worksheet.     name: REGGIE   Agency: [employer]  Language: Dutch   Telephone number: [phone]  Type of interpretation: Telephone, spoken

## 2021-07-14 NOTE — PROGRESS NOTES
"    Assessment & Plan     Preoperative examination  Patient has received treatment for latent tuberculosis related to her positive skin tuberculin test in the past, however in order to avoid delaying her cataract surgery we obtained a screening x-ray today.  Patient's x-ray was negative.  Okay to proceed with surgery.  - XR Chest 1 View        Delfino Quarles MD  [facility]    Angela Randle is a 65 year old who presents for the following health issues        HPI     Pt comes in today stating that she needs a chest xray as part of a preop process for cataract surgery tomorrow.  Patient was called by her eye clinic requesting this.  I called and spoke with the clinic who states that they requested due to record of a positive skin tuberculin test in the past.  Per our records patient has received 6 months of isoniazid for latent tuberculosis in the past.  Patient has no cough or fever or night sweats.      Review of Systems   Constitutional, HEENT, cardiovascular, pulmonary, gi and gu systems are negative, except as otherwise noted.      Objective    /79 (BP Location: Left arm, Patient Position: Sitting, Cuff Size: Adult Small)   Pulse 78   Temp 98.3  F (36.8  C) (Oral)   Resp 16   Ht 1.543 m (5' 0.75\")   Wt 64.1 kg (141 lb 6.4 oz)   SpO2 98%   BMI 26.94 kg/m    Body mass index is 26.94 kg/m .  Physical Exam   GENERAL: healthy, alert and no distress  EYES: Eyes grossly normal to inspection, PERRL and conjunctivae and sclerae normal  NECK: no adenopathy, no asymmetry, masses, or scars and thyroid normal to palpation  RESP: lungs clear to auscultation - no rales, rhonchi or wheezes  CV: regular rate and rhythm, normal S1 S2, no S3 or S4, no murmur, click or rub, no peripheral edema and peripheral pulses strong  MS: no gross musculoskeletal defects noted, no edema  NEURO: Normal strength and tone, mentation intact and speech normal  PSYCH: mentation appears normal, affect " normal/bright    Results for orders placed or performed in visit on 07/14/21   XR Chest 1 View    Impression    IMPRESSION: Lungs are clear. Heart and pulmonary vascularity are normal. No signs of acute disease. Distal descending thoracic aorta is ectatic.

## 2021-07-21 ENCOUNTER — RECORDS - HEALTHEAST (OUTPATIENT)
Dept: ADMINISTRATIVE | Facility: CLINIC | Age: 66
End: 2021-07-21

## 2021-07-22 ENCOUNTER — RECORDS - HEALTHEAST (OUTPATIENT)
Dept: SCHEDULING | Facility: CLINIC | Age: 66
End: 2021-07-22

## 2021-07-22 DIAGNOSIS — Z12.31 OTHER SCREENING MAMMOGRAM: ICD-10-CM

## 2021-08-10 NOTE — PROGRESS NOTES
Preceptor Attestation:    I discussed the patient with the resident and evaluated the patient in person. I have verified the content of the note, which accurately reflects my assessment of the patient and the plan of care.   Supervising Physician:  Pa Pedro MD.                   \

## 2021-09-28 ENCOUNTER — OFFICE VISIT (OUTPATIENT)
Dept: FAMILY MEDICINE | Facility: CLINIC | Age: 66
End: 2021-09-28
Payer: MEDICARE

## 2021-09-28 VITALS
HEIGHT: 61 IN | OXYGEN SATURATION: 99 % | TEMPERATURE: 98.1 F | DIASTOLIC BLOOD PRESSURE: 86 MMHG | BODY MASS INDEX: 26.96 KG/M2 | HEART RATE: 99 BPM | WEIGHT: 142.8 LBS | SYSTOLIC BLOOD PRESSURE: 137 MMHG | RESPIRATION RATE: 24 BRPM

## 2021-09-28 DIAGNOSIS — I20.89 ANGINA OF EFFORT (H): ICD-10-CM

## 2021-09-28 DIAGNOSIS — R73.03 PRE-DIABETES: ICD-10-CM

## 2021-09-28 DIAGNOSIS — I10 BENIGN ESSENTIAL HYPERTENSION: Chronic | ICD-10-CM

## 2021-09-28 DIAGNOSIS — E78.00 PURE HYPERCHOLESTEROLEMIA: Primary | ICD-10-CM

## 2021-09-28 DIAGNOSIS — Z23 NEED FOR PROPHYLACTIC VACCINATION AND INOCULATION AGAINST INFLUENZA: ICD-10-CM

## 2021-09-28 PROCEDURE — G0008 ADMIN INFLUENZA VIRUS VAC: HCPCS | Performed by: FAMILY MEDICINE

## 2021-09-28 PROCEDURE — 90662 IIV NO PRSV INCREASED AG IM: CPT | Performed by: FAMILY MEDICINE

## 2021-09-28 PROCEDURE — 99215 OFFICE O/P EST HI 40 MIN: CPT | Mod: 25 | Performed by: FAMILY MEDICINE

## 2021-09-28 RX ORDER — LATANOPROST 50 UG/ML
1 SOLUTION/ DROPS OPHTHALMIC DAILY
COMMUNITY
Start: 2021-09-28 | End: 2023-10-24

## 2021-09-28 ASSESSMENT — MIFFLIN-ST. JEOR: SCORE: 1121.15

## 2021-09-28 NOTE — PROGRESS NOTES
[facility] [address] [place] [address]  Phone: [phone]  Fax: [phone]  Primary Provider: Cristin Mcadams  Pre-op Performing Provider: CRISTIN MCADAMS      PREOPERATIVE EVALUATION:  Today's date: 9/28/2021    Razia Dos Santos is a 65 year old female who presents for a preoperative evaluation.    Surgical Information:  Surgery/Procedure: Phacoemulsification/Intraocula Lens. Right  Surgery Location: [facility]  Surgeon: Dr. Alexis Ridley  Surgery Date: 9/30/21  Time of Surgery: 9am  Where patient plans to recover: At home with family  Fax number for surgical facility: [phone]    Type of Anesthesia Anticipated: Topical    Assessment & Plan     The proposed surgical procedure is considered LOW risk.    Pure hypercholesterolemia  Continue with the current dose of simvastatin.  We will recheck lipids in February of next year.    Pre-diabetes  Her last hemoglobin A1c was 5.7.  She is not on any medications for diabetes.  We will recheck this next February.    Benign essential hypertension  Sounds like she is having symptomatic hypotension.  We will have her adjust the dosing of her medications so she takes lisinopril 5 mg in the morning and 10 mg in the evening.    Angina of effort (H)  She has a 1 to 2-year history of occasional shortness of breath lasting 60 seconds or less brought on by exertion and relieved by rest.  This happens perhaps once a month.  I do not think this needs to keep her from having cataract surgery but she should have a timely stress test given her age, positive family history, hypertension, and prediabetes.  - Nuclear Med with Lexiscan (Stress Test); Future         Risks and Recommendations:  The patient has the following additional risks and recommendations for perioperative complications:   - No identified additional risk factors other than previously addressed    Medication Instructions:  Patient is to take all scheduled medications on the day of  "surgery    RECOMMENDATION:  APPROVAL GIVEN to proceed with proposed procedure, without further diagnostic evaluation.    Review of external notes as documented above     Diagnosis or treatment significantly limited by social determinants of health -language barrier.    55 minutes spent on the date of the encounter doing chart review, interpretation of tests, patient visit and documentation         Subjective     HPI related to upcoming procedure: Has had previous LEFT cataract surgery and now will be getting RIGHT cataract surgery.    She has HTN and takes lisinopril 15 mg a day (a 10 and a 5 mg tab).  She checks BP at home and SBP is typically in the 110s-120s.  At times the SBP is in the 90s and she might feel experience dizziness with this.    Preop Questions 9/28/2021   1. Have you ever had a heart attack or stroke? No   2. Have you ever had surgery on your heart or blood vessels, such as a stent placement, a coronary artery bypass, or surgery on an artery in your head, neck, heart, or legs? No   3. Do you have chest pain with activity? No   4. Do you have a history of  heart failure? No   5. Do you currently have a cold, bronchitis or symptoms of other infection? No   6. Do you have a cough, shortness of breath, or wheezing? No   7. Do you or anyone in your family have previous history of blood clots? No   8. Do you or does anyone in your family have a serious bleeding problem such as prolonged bleeding following surgeries or cuts? No   9. Have you ever had problems with anemia or been told to take iron pills? No   10. Have you had any abnormal blood loss such as black, tarry or bloody stools, or abnormal vaginal bleeding? No   11. Have you ever had a blood transfusion? No   12. Are you willing to have a blood transfusion if it is medically needed before, during, or after your surgery? NO - not religiously opposed but \"scared\"   13. Have you or any of your relatives ever had problems with anesthesia? No   14. " "Do you have sleep apnea, excessive snoring or daytime drowsiness? No   15. Do you have any artifical heart valves or other implanted medical devices like a pacemaker, defibrillator, or continuous glucose monitor? No   16. Do you have artificial joints? No   17. Are you allergic to latex? No       Health Care Directive:  Patient does not have a Health Care Directive or Living Will: Discussed advance care planning with patient; however, patient declined at this time. She does want to receive any interventions that \"might help.\"  I take that to mean she is \"FULL CODE.\"    Preoperative Review of :   reviewed - no record of controlled substances prescribed.      Status of Chronic Conditions:  HYPERLIPIDEMIA - Patient has a long history of significant Hyperlipidemia requiring medication for treatment with recent good control. Patient reports no problems or side effects with the medication.     HYPERTENSION - Patient has longstanding history of HTN , currently denies any symptoms referable to elevated blood pressure. Specifically denies chest pain, palpitations, dyspnea, orthopnea, PND or peripheral edema. Blood pressure readings have been in normal range. Current medication regimen is as listed below. Patient denies any side effects of medication.       Review of Systems  CONSTITUTIONAL: NEGATIVE for fever, chills, change in weight  ENT/MOUTH: NEGATIVE for ear, mouth and throat problems  RESP: NEGATIVE for significant cough or SOB  CV: NEGATIVE for palpitations or peripheral edema.  POSITIVE for occasional CP lasting 60 seconds or less brought on by exertion and relieved with rest.  This happens once a month.    Patient Active Problem List    Diagnosis Date Noted     Benign paroxysmal positional vertigo, unspecified laterality 03/11/2021     Priority: Medium     Breast cancer screening 03/28/2018     Priority: Medium     1/15/19: Birads 2 - heterogenously dense breasts. Recommend routine screening       Pre-diabetes " 03/13/2017     Priority: Medium     Chronic rhinitis 03/13/2017     Priority: Medium     Benign essential hypertension 10/16/2015     Priority: Medium     Glaucoma 06/26/2014     Priority: Medium     Osteopenia 08/30/2013     Priority: Medium     DEXA scan performed on 8/14/13 showed osteopenia.   Lumbar spine T score: 0.3; Z score 1.4  Left proximal femur T score -0.9; Z score 0  Left femoral neck T score -1.8; Z score -0.7  Total right proximal femur T score -0.9; Z score 0  Right femoral neck T score -1.8; Z score -0.6  DEXA 2/7/18: L1-L4 T0.6, L femoral neck T 2.2, right femoral neck T -2.1. Major osteoporotic fx risk 11.9%. Recommend FU in 2 years with calcium, vitamin D, weight bearing exercise.        Abnormal Pap smear of cervix      Priority: Medium       11/9/2018 Normal pap and negative HPV. Can consider discontinuing screening with 3 consecutive normals  7/9/15 nl Pap, pt needs repeat Pap in 3 yrs.  6/26/14 nl Pap with negative HPV, pt needs repeat Pap in 1 yr.  6/4/13 COLP APPT: repeat Pap nl, positive low risk HPV 71.  Pt had nl colposcopic exam and will need repeat Pap in 1yr  3/2013:  Normal pap, positive for low risk HPV and cervical polyp seen  1/6/12 nl Pap with +unknown risk HPV and cervical polyp seen    9/26/08 nl Pap  7/26/06 nl Pap  1/6/06 nl Pap  7/19/05 COLP: 8:00 cervical bx- LUIS 1 and ECC- neg dysplasia  5/13/04 COLP: Repeat Pap- nl and ECC- neg dysplasia  6/13/02 COLP  5/17/02 ASCUS  2/28/02 ASCUS  11/17/99 COLP: atypia       Pure hypercholesterolemia 02/19/2013     Priority: Medium     Insomnia 02/19/2013     Priority: Medium     Problem list name updated by automated process. Provider to review       Nonspecific reaction to tuberculin skin test without active tuberculosis 02/19/2013     Priority: Medium     Problem list name updated by automated process. Provider to review and confirm  Problem list name updated by automated process. Provider to review       Migraine 02/19/2013      "Priority: Medium     Problem list name updated by automated process. Provider to review        Past Medical History:   Diagnosis Date     HPV (low risk) 3/14/2013    3/2013:  Normal pap, positive for low risk HPV.   H/O ASCUS in 2005, +HPV of undetermined risk in 2012 No history of high grade lesion     Hypertension      History reviewed. No pertinent surgical history.  Current Outpatient Medications   Medication Sig Dispense Refill     calcium 600-200 MG-UNIT TABS Take  by mouth. Take 2 tablet daily       lisinopril (ZESTRIL) 10 MG tablet Take 1 tablet (10 mg) by mouth daily 90 tablet 3     lisinopril (ZESTRIL) 5 MG tablet Take 1 tablet (5 mg) by mouth daily 60 tablet 3     Multiple Vitamin (MULTIVITAMIN  S) CAPS Take 1 capsule by mouth daily Take 1 tablet daily 90 capsule 0     simvastatin (ZOCOR) 20 MG tablet Take 1 tablet (20 mg) by mouth At Bedtime 90 tablet 3     VITAMIN D3 25 MCG (1000 UT) tablet TAKE 1 TABLET BY MOUTH DAILY 100 tablet 3     Ibuprofen (ADVIL PO)  (Patient not taking: Reported on 7/14/2021)       latanoprost (XALATAN) 0.005 % ophthalmic solution 1 drop daily (Patient not taking: Reported on 7/14/2021)       SHINGRIX injection ADM 0.5ML IM UTD (Patient not taking: Reported on 7/14/2021)         Allergies   Allergen Reactions     Estrace [Estradiol Cypionate]      Blurred vision, cramping        Social History     Tobacco Use     Smoking status: Never Smoker     Smokeless tobacco: Never Used   Substance Use Topics     Alcohol use: No     Reviewed and updated as needed.  No FH of reaction to anesthesia.  Mom had heart failure.  Her brother has had stents in his heart.  History   Drug Use No         Objective     /86   Pulse 99   Temp 98.1  F (36.7  C) (Oral)   Resp 24   Ht 1.543 m (5' 0.75\")   Wt 64.8 kg (142 lb 12.8 oz)   SpO2 99%   BMI 27.20 kg/m      Physical Exam  GENERAL APPEARANCE: healthy, alert and no distress  HENT: ear canals and TM's normal and nose and mouth without " ulcers or lesions  RESP: lungs clear to auscultation - no rales, rhonchi or wheezes  CV: regular rate and rhythm, normal S1 S2, no S3 or S4 and no murmur, click or rub   ABDOMEN: soft, nontender, no HSM or masses and bowel sounds normal  NEURO: Normal strength and tone, sensory exam grossly normal, mentation intact and speech normal    Recent Labs   Lab Test 03/23/21  0903 02/22/21  0943 08/06/20  1036 12/05/19  1345   HGB  --   --  13.6  --      --  143.0*  --    POTASSIUM 4.6  --  4.3  --    CR 0.91  --  0.8  --    A1C  --  5.7  --  5.3        Diagnostics:  No labs were ordered during this visit.   No EKG required for low risk surgery (cataract, skin procedure, breast biopsy, etc).    Revised Cardiac Risk Index (RCRI):  The patient has the following serious cardiovascular risks for perioperative complications:   - No serious cardiac risks = 0 points     RCRI Interpretation: 0 points: Class I (very low risk - 0.4% complication rate)     The patient does report intermittent CP with exertion x 1-2 years.  This should NOT keep her from having her eye surgery (she had left cataract surgery a few months ago) but she should have a stress test soon given her risk factors of age, HTN, prediabetes and positive FHx.         Signed Electronically by: Walter He MD  Copy of this evaluation report is provided to requesting physician.

## 2021-09-28 NOTE — PATIENT INSTRUCTIONS
It sounds like your blood pressure is too low at times.    I suggest that you take lisinopril 5mg in the morning and 10 mg in the evening (with your cholesterol medicine).  If your blood pressure is top number is often in the 90s then you should let us know and we would decrease the dose of the blood pressure medicine.    09/28/21   [facility]  [place] [id number]  Phone:[phone]    Appointment: Thursday Oct 7th   Arrival Time: 8:00am     No caffeine  12 hours prior (includes decaf, chocolate etc..)  No eating or drinking 4 hours before

## 2021-09-28 NOTE — NURSING NOTE
Due to patient being non-English speaking/uses sign language, an  was used for this visit. Only for face-to-face interpretation by an external agency, date and length of interpretation can be found on the scanned worksheet.     name: Thiago  Agency: [employer]  Language: Yakut   Telephone number: [phone]  Type of interpretation: Face-to-face, spoken

## 2021-11-23 ENCOUNTER — OFFICE VISIT (OUTPATIENT)
Dept: FAMILY MEDICINE | Facility: CLINIC | Age: 66
End: 2021-11-23
Payer: MEDICARE

## 2021-11-23 VITALS
SYSTOLIC BLOOD PRESSURE: 154 MMHG | DIASTOLIC BLOOD PRESSURE: 90 MMHG | TEMPERATURE: 98.2 F | RESPIRATION RATE: 16 BRPM | WEIGHT: 143.4 LBS | OXYGEN SATURATION: 99 % | HEART RATE: 88 BPM | BODY MASS INDEX: 27.32 KG/M2

## 2021-11-23 DIAGNOSIS — I10 BENIGN ESSENTIAL HYPERTENSION: ICD-10-CM

## 2021-11-23 DIAGNOSIS — Z20.822 ENCOUNTER FOR LABORATORY TESTING FOR COVID-19 VIRUS: Primary | ICD-10-CM

## 2021-11-23 DIAGNOSIS — R50.9 SUBJECTIVE FEVER: ICD-10-CM

## 2021-11-23 DIAGNOSIS — R09.81 NASAL CONGESTION: ICD-10-CM

## 2021-11-23 DIAGNOSIS — G44.209 ACUTE NON INTRACTABLE TENSION-TYPE HEADACHE: ICD-10-CM

## 2021-11-23 PROCEDURE — 99213 OFFICE O/P EST LOW 20 MIN: CPT | Mod: CS | Performed by: STUDENT IN AN ORGANIZED HEALTH CARE EDUCATION/TRAINING PROGRAM

## 2021-11-23 PROCEDURE — U0003 INFECTIOUS AGENT DETECTION BY NUCLEIC ACID (DNA OR RNA); SEVERE ACUTE RESPIRATORY SYNDROME CORONAVIRUS 2 (SARS-COV-2) (CORONAVIRUS DISEASE [COVID-19]), AMPLIFIED PROBE TECHNIQUE, MAKING USE OF HIGH THROUGHPUT TECHNOLOGIES AS DESCRIBED BY CMS-2020-01-R: HCPCS | Performed by: STUDENT IN AN ORGANIZED HEALTH CARE EDUCATION/TRAINING PROGRAM

## 2021-11-23 PROCEDURE — U0005 INFEC AGEN DETEC AMPLI PROBE: HCPCS | Performed by: STUDENT IN AN ORGANIZED HEALTH CARE EDUCATION/TRAINING PROGRAM

## 2021-11-23 NOTE — NURSING NOTE
Due to patient being non-English speaking/uses sign language, an  was used for this visit. Only for face-to-face interpretation by an external agency, date and length of interpretation can be found on the scanned worksheet.     name: Bradley [id number]  Agency: [employer]  Language: Macedonian   Telephone number: Jon  Type of interpretation: Telephone, spoken

## 2021-11-23 NOTE — PROGRESS NOTES
Preceptor attestation:  Vital signs reviewed: BP (!) 154/90   Pulse 88   Temp 98.2  F (36.8  C)   Resp 16   Wt 65 kg (143 lb 6.4 oz)   SpO2 99%   BMI 27.32 kg/m      Patient seen, evaluated, and discussed with the resident.  I have verified the content of the note, which accurately reflects my assessment of the patient and the plan of care.    Supervising physician: Mica Nelson MD  [facility]

## 2021-11-23 NOTE — PROGRESS NOTES
Assessment & Plan     1. Encounter for laboratory testing for COVID-19 virus  2. Subjective fever  3. Acute non intractable tension-type headache  4. Nasal congestion  She presents with 3-4 days of symptoms concerning for COVID. No known exposures. She is well-appearing and vitally stable. She is vaccinated (no booster yet) so if she is positive, suspect that the case is milder than it could have potentially been without vaccination. Discussed symptomatic cares at home, quarantine until test results, and symptoms/reasons to call clinic versus present to ER. Patient is agreeable to this plan.   - Symptomatic COVID-19 Virus (Coronavirus) by PCR Nose    5. Benign essential hypertension  Blood pressure elevated today. She is on lisinopril (dose 5 or 10 mg?). Encouraged her to schedule follow-up in clinic once she is feeling better.     Ordering of each unique test  20 minutes spent on the date of the encounter doing chart review, history and exam, documentation and further activities per the note     Return if symptoms worsen or fail to improve.    Queta Trevino MD PGY3  [facility]    Angela Randle is a 66 year old who presents for the following health issues:    HPI   She reports subjective fever, headache, and nasal congestion for about 3-4 days. Taking ibuprofen. Last dose 2 days ago. No nausea or vomiting. No loss of taste/smell. No diarrhea or abdominal pain. No cough or shortness of breath.      She has received both COVID vaccines (no booster yet). No sick contacts. Doesn't work outside of the home. She lives at home with her large family.        Review of Systems   Constitutional, HEENT, cardiovascular, pulmonary, gi and gu systems are negative, except as otherwise noted.      Objective    BP (!) 154/90   Pulse 88   Temp 98.2  F (36.8  C)   Resp 16   Wt 65 kg (143 lb 6.4 oz)   SpO2 99%   BMI 27.32 kg/m    Body mass index is 27.32 kg/m .  Physical Exam   GENERAL: healthy,  alert and no distress  NECK: no adenopathy, no asymmetry, masses, or scars and thyroid normal to palpation  RESP: lungs clear to auscultation - no rales, rhonchi or wheezes  CV: regular rate and rhythm, normal S1 S2, no S3 or S4, no murmur, click or rub, no peripheral edema and peripheral pulses strong  MS: no gross musculoskeletal defects noted, no edema

## 2021-11-23 NOTE — LETTER
December 6, 2021      Razia AMBRIZ Raya  [address]  [place] [address]        Dear ,    I am writing to inform you of your test results. Your Covid test is negative.     Resulted Orders   Symptomatic COVID-19 Virus (Coronavirus) by PCR Nose   Result Value Ref Range    SARS CoV2 PCR Negative Negative, Testing sent to reference lab. Results will be returned via unsolicited result      Comment:      NEGATIVE: SARS-CoV-2 (COVID-19) RNA not detected, presumed negative.    Narrative    Testing was performed using the AptVeeqo SARS-CoV-2 Assay on the  Dripp[employer] Instrument System. Additional information about this  Emergency Use Authorization (EUA) assay can be found via the Lab  Guide. This test should be ordered for the detection of SARS-CoV-2 in  individuals who meet SARS-CoV-2 clinical and/or epidemiological  criteria. Test performance is unknown in asymptomatic patients. This  test is for in vitro diagnostic use under the FDA EUA for  laboratories certified under CLIA to perform high complexity testing.  This test has not been FDA cleared or approved. A negative result  does not rule out the presence of PCR inhibitors in the specimen or  target RNA in concentration below the limit of detection for the  assay. The possibility of a false negative should be considered if  the patient's recent exposure or clinical presentation suggests  COVID-19. This test was validated by the [facility]. This laboratory is certified under  the Clinical Laboratory Improvement Amendments of 1988 (CLIA-88) as  qualified to perform high complexity laboratory testing.   If you have any questions or concerns, please call the clinic at the number listed above.     Sincerely,    Queta Trevino MD

## 2021-11-24 LAB — SARS-COV-2 RNA RESP QL NAA+PROBE: NEGATIVE

## 2021-12-02 ENCOUNTER — IMMUNIZATION (OUTPATIENT)
Dept: FAMILY MEDICINE | Facility: CLINIC | Age: 66
End: 2021-12-02
Payer: MEDICARE

## 2021-12-02 PROCEDURE — 0004A PR COVID VAC PFIZER DIL RECON 30 MCG/0.3 ML IM: CPT

## 2021-12-02 PROCEDURE — 91300 PR COVID VAC PFIZER DIL RECON 30 MCG/0.3 ML IM: CPT

## 2022-01-05 ENCOUNTER — ANCILLARY PROCEDURE (OUTPATIENT)
Dept: GENERAL RADIOLOGY | Facility: CLINIC | Age: 67
End: 2022-01-05
Attending: FAMILY MEDICINE
Payer: COMMERCIAL

## 2022-01-05 ENCOUNTER — OFFICE VISIT (OUTPATIENT)
Dept: FAMILY MEDICINE | Facility: CLINIC | Age: 67
End: 2022-01-05
Payer: COMMERCIAL

## 2022-01-05 VITALS
BODY MASS INDEX: 26.82 KG/M2 | RESPIRATION RATE: 16 BRPM | HEART RATE: 82 BPM | SYSTOLIC BLOOD PRESSURE: 131 MMHG | DIASTOLIC BLOOD PRESSURE: 83 MMHG | WEIGHT: 140.8 LBS | OXYGEN SATURATION: 97 % | TEMPERATURE: 98.1 F

## 2022-01-05 DIAGNOSIS — E55.9 VITAMIN D DEFICIENCY: ICD-10-CM

## 2022-01-05 DIAGNOSIS — Z20.822 EXPOSURE TO 2019 NOVEL CORONAVIRUS: ICD-10-CM

## 2022-01-05 DIAGNOSIS — I10 BENIGN ESSENTIAL HYPERTENSION: ICD-10-CM

## 2022-01-05 DIAGNOSIS — M25.561 ACUTE PAIN OF RIGHT KNEE: ICD-10-CM

## 2022-01-05 DIAGNOSIS — M25.561 ACUTE PAIN OF RIGHT KNEE: Primary | ICD-10-CM

## 2022-01-05 PROCEDURE — U0005 INFEC AGEN DETEC AMPLI PROBE: HCPCS | Performed by: FAMILY MEDICINE

## 2022-01-05 PROCEDURE — U0003 INFECTIOUS AGENT DETECTION BY NUCLEIC ACID (DNA OR RNA); SEVERE ACUTE RESPIRATORY SYNDROME CORONAVIRUS 2 (SARS-COV-2) (CORONAVIRUS DISEASE [COVID-19]), AMPLIFIED PROBE TECHNIQUE, MAKING USE OF HIGH THROUGHPUT TECHNOLOGIES AS DESCRIBED BY CMS-2020-01-R: HCPCS | Performed by: FAMILY MEDICINE

## 2022-01-05 PROCEDURE — 99214 OFFICE O/P EST MOD 30 MIN: CPT | Mod: CS | Performed by: FAMILY MEDICINE

## 2022-01-05 PROCEDURE — 73560 X-RAY EXAM OF KNEE 1 OR 2: CPT | Mod: RT | Performed by: RADIOLOGY

## 2022-01-05 RX ORDER — VITAMIN B COMPLEX
1 TABLET ORAL DAILY
Qty: 100 TABLET | Refills: 3 | Status: SHIPPED | OUTPATIENT
Start: 2022-01-05 | End: 2023-01-16

## 2022-01-05 RX ORDER — LISINOPRIL 10 MG/1
10 TABLET ORAL DAILY
Qty: 90 TABLET | Refills: 3 | Status: SHIPPED | OUTPATIENT
Start: 2022-01-05 | End: 2022-12-06

## 2022-01-05 RX ORDER — LISINOPRIL 5 MG/1
5 TABLET ORAL DAILY
Qty: 90 TABLET | Refills: 3 | Status: SHIPPED | OUTPATIENT
Start: 2022-01-05 | End: 2022-12-06

## 2022-01-05 RX ORDER — SIMVASTATIN 20 MG
20 TABLET ORAL AT BEDTIME
Qty: 90 TABLET | Refills: 3 | Status: SHIPPED | OUTPATIENT
Start: 2022-01-05 | End: 2022-12-06

## 2022-01-05 NOTE — LETTER
January 6, 2022      Razia Dos Santos  [address]  [place] [address]        Dear MsBeverly,    We are writing to inform you of your test results.      The xrays show severe arthritis on the knee.  Let's follow up in the clinic in the next month      Resulted Orders   XR Knee Standing Right 2 Views    Narrative    EXAM: XR KNEE STANDING RIGHT 2 VIEWS  LOCATION: [facility]  DATE/TIME: 1/5/2022 9:17 AM    INDICATION:  Acute pain of right knee  COMPARISON: None.      Impression    IMPRESSION: No fracture. Advanced degenerative changes in the medial and mild degenerative changes in the lateral compartment.       If you have any questions or concerns, please call the clinic at the number listed above.       Sincerely,      Walter He MD

## 2022-01-05 NOTE — NURSING NOTE
Due to patient being non-English speaking/uses sign language, an  was used for this visit. Only for face-to-face interpretation by an external agency, date and length of interpretation can be found on the scanned worksheet.     name: Kelli Lockett Mai  Agency: [employer]  Language: Indonesian   Telephone number: [phone]  Type of interpretation: Face-to-face, spoken

## 2022-01-05 NOTE — PROGRESS NOTES
"Assessment & Plan     Benign essential hypertension    - simvastatin (ZOCOR) 20 MG tablet; Take 1 tablet (20 mg) by mouth At Bedtime  - lisinopril (ZESTRIL) 5 MG tablet; Take 1 tablet (5 mg) by mouth daily  - lisinopril (ZESTRIL) 10 MG tablet; Take 1 tablet (10 mg) by mouth daily    Vitamin D deficiency    - Vitamin D3 (VITAMIN D3) 25 mcg (1000 units) tablet; Take 1 tablet (25 mcg) by mouth daily    Acute pain of right knee  I think this represents osteoarthritis.  We will get plain films as we have not done this previously.  I offered injection versus pills versus topical treatment versus physical therapy, etc.  She wants to try topical therapy.  I also encouraged her to take Tylenol 3 g a day.  - XR Knee Standing Right 2 Views; Future  - diclofenac (VOLTAREN) 1 % topical gel; Apply 4 g topically 4 times daily    Exposure to 2019 novel coronavirus  She was exposed to someone who subsequently was found to have COVID-19.  This happened several days ago.  She is asymptomatic.  We will check a swab today.  - Asymptomatic COVID-19 Virus (Coronavirus) by PCR Nose    Diagnosis or treatment significantly limited by social determinants of health - Language barrier, low health literacy.  30 minutes spent on the date of the encounter doing patient visit and documentation        BMI:   Estimated body mass index is 26.82 kg/m  as calculated from the following:    Height as of 9/28/21: 1.543 m (5' 0.75\").    Weight as of this encounter: 63.9 kg (140 lb 12.8 oz).     Walter He MD  [facility]    Options for treatment and/or follow-up care were reviewed with the patient. Razia Dos Santos was engaged and actively involved in the decision making process. She verbalized understanding of the options discussed and was satisfied with the final plan.    RTC in 1 month for follow up of knee pain or sooner if develops new or worsening symptoms.    Subjective: Razia Dos Santos is a 66 year old with a few concerns.    She has " right knee pain now for months but worse now in the last few weeks.  She took ibuprofen that helps a little.  The pain is worse with standing and walking.  The pain does not keep her up at night  BP at home is around 130s/80s..  PMHX/PSHX/MEDS/ALLERGIES/SHX/FHX reviewed and updated in Epic.   ROS:   General: No fevers, chills   Head: No headache   CV: No chest pain or palpitations.   Resp: No shortness of breath. No cough. No hemoptysis.   GI: No nausea or vomiting.  No constipation or diarrhea.  Objective: /83   Pulse 82   Temp 98.1  F (36.7  C) (Oral)   Resp 16   Wt 63.9 kg (140 lb 12.8 oz)   SpO2 97%   BMI 26.82 kg/m     Gen: Well nourished and in NAD   CV: RRR - no murmurs, rubs, or gallups  Pulm: Clear to auscultation without wheezing or crackles  Ortho: No crepitus but pain through range of motion of the knee.  No joint line tenderness.  No effusion.  No warmth.  Extrem: no cyanosis, edema or clubbing   Psych: Euthymic

## 2022-01-06 LAB — SARS-COV-2 RNA RESP QL NAA+PROBE: NEGATIVE

## 2022-01-06 NOTE — RESULT ENCOUNTER NOTE
The xrays show severe arthritis on the knee.  Let's follow up in the clinic in the next month.    DB

## 2022-02-01 ENCOUNTER — OFFICE VISIT (OUTPATIENT)
Dept: FAMILY MEDICINE | Facility: CLINIC | Age: 67
End: 2022-02-01
Payer: COMMERCIAL

## 2022-02-01 VITALS
OXYGEN SATURATION: 97 % | RESPIRATION RATE: 16 BRPM | DIASTOLIC BLOOD PRESSURE: 82 MMHG | WEIGHT: 138.8 LBS | SYSTOLIC BLOOD PRESSURE: 135 MMHG | HEART RATE: 79 BPM | BODY MASS INDEX: 26.44 KG/M2

## 2022-02-01 DIAGNOSIS — M25.561 ACUTE PAIN OF RIGHT KNEE: ICD-10-CM

## 2022-02-01 DIAGNOSIS — Z12.31 ENCOUNTER FOR SCREENING MAMMOGRAM FOR BREAST CANCER: Primary | ICD-10-CM

## 2022-02-01 PROCEDURE — 99214 OFFICE O/P EST MOD 30 MIN: CPT | Performed by: FAMILY MEDICINE

## 2022-02-01 NOTE — PROGRESS NOTES
"Assessment & Plan     Acute pain of right knee    - diclofenac (VOLTAREN) 1 % topical gel; Apply 4 g topically 4 times daily    Encounter for screening mammogram for breast cancer    - MA SCREENING DIGITAL BILAT; Future    Diagnosis or treatment significantly limited by social determinants of health - Language barrier  Prescription drug management  30 minutes spent on the date of the encounter doing chart review, patient visit and documentation        BMI:   Estimated body mass index is 26.44 kg/m  as calculated from the following:    Height as of 9/28/21: 1.543 m (5' 0.75\").    Weight as of this encounter: 63 kg (138 lb 12.8 oz).     Walter He MD  [facility]    Options for treatment and/or follow-up care were reviewed with the patient. Razia Dos Santos was engaged and actively involved in the decision making process. She verbalized understanding of the options discussed and was satisfied with the final plan.    RTC in 1 month for follow up of stress test or sooner if develops new or worsening symptoms.    Subjective: Razia Dos Santos is a 66 year old coming in to follow-up in a few things.    She has been having right knee pain it has been getting worse for several months.  We did an x-ray which was consistent with moderate osteoarthritis.  She has been doing the diclofenac gel and this is been helpful.  Her insurance will only cover 300 g at this per month instead of 350 so we need to modify the prescription.  BP at home in the 120s at home.  She takes lisinopril 15 mg a day.  This is 2 pills and we have discussed going to a single 20 mg tablet but she is quite content taking 2 tablets for a total of 15 mg and this is been controlling her blood pressure.  She will have a stress test next week.  She has been having dyspnea with exertion and given her age and hypertension and her history of prediabetes, etc., we need to evaluate this.    Needs a mammogram  PMHX/PSHX/MEDS/ALLERGIES/SHX/FHX reviewed and " updated in Epic.   ROS:   General: No fevers, chills   Head: No headache    GI: No nausea or vomiting.   Objective: /82 (BP Location: Right arm, Patient Position: Sitting, Cuff Size: Adult Regular)   Pulse 79   Resp 16   Wt 63 kg (138 lb 12.8 oz)   SpO2 97%   BMI 26.44 kg/m     Gen: Well nourished and in NAD   CV: RRR - no murmurs, rubs, or gallups  Pulm: Clear to auscultation without wheezing or crackles  Extrem: no cyanosis, edema or clubbing   Psych: Euthymic

## 2022-02-01 NOTE — NURSING NOTE
Due to patient being non-English speaking/uses sign language, an  was used for this visit. Only for face-to-face interpretation by an external agency, date and length of interpretation can be found on the scanned worksheet.     name: Sanjay  Agency: [employer]  Language: Georgian   Telephone number: [phone]  Type of interpretation: Face-to-face, spoken

## 2022-02-08 ENCOUNTER — HOSPITAL ENCOUNTER (OUTPATIENT)
Dept: NUCLEAR MEDICINE | Facility: HOSPITAL | Age: 67
End: 2022-02-08
Attending: FAMILY MEDICINE
Payer: COMMERCIAL

## 2022-02-08 ENCOUNTER — HOSPITAL ENCOUNTER (OUTPATIENT)
Dept: CARDIOLOGY | Facility: HOSPITAL | Age: 67
End: 2022-02-08
Attending: FAMILY MEDICINE
Payer: COMMERCIAL

## 2022-02-08 DIAGNOSIS — I20.89 ANGINA OF EFFORT (H): ICD-10-CM

## 2022-02-08 LAB
CV STRESS CURRENT BP HE: NORMAL
CV STRESS CURRENT HR HE: 103
CV STRESS CURRENT HR HE: 104
CV STRESS CURRENT HR HE: 104
CV STRESS CURRENT HR HE: 108
CV STRESS CURRENT HR HE: 64
CV STRESS CURRENT HR HE: 65
CV STRESS CURRENT HR HE: 75
CV STRESS CURRENT HR HE: 88
CV STRESS CURRENT HR HE: 88
CV STRESS CURRENT HR HE: 89
CV STRESS CURRENT HR HE: 91
CV STRESS CURRENT HR HE: 91
CV STRESS CURRENT HR HE: 95
CV STRESS CURRENT HR HE: 96
CV STRESS CURRENT HR HE: 99
CV STRESS CURRENT HR HE: 99
CV STRESS DEVIATION TIME HE: NORMAL
CV STRESS ECHO PERCENT HR HE: NORMAL
CV STRESS EXERCISE STAGE HE: NORMAL
CV STRESS FINAL RESTING BP HE: NORMAL
CV STRESS FINAL RESTING HR HE: 89
CV STRESS MAX HR HE: 109
CV STRESS MAX TREADMILL GRADE HE: 0
CV STRESS MAX TREADMILL SPEED HE: 0
CV STRESS PEAK DIA BP HE: NORMAL
CV STRESS PEAK SYS BP HE: NORMAL
CV STRESS PHASE HE: NORMAL
CV STRESS PROTOCOL HE: NORMAL
CV STRESS RESTING PT POSITION HE: NORMAL
CV STRESS ST DEVIATION AMOUNT HE: NORMAL
CV STRESS ST DEVIATION ELEVATION HE: NORMAL
CV STRESS ST EVELATION AMOUNT HE: NORMAL
CV STRESS TEST TYPE HE: NORMAL
CV STRESS TOTAL STAGE TIME MIN 1 HE: NORMAL
RATE PRESSURE PRODUCT: NORMAL
STRESS ECHO BASELINE DIASTOLIC HE: 86
STRESS ECHO BASELINE HR: 72
STRESS ECHO BASELINE SYSTOLIC BP: 190
STRESS ECHO CALCULATED PERCENT HR: 71 %
STRESS ECHO LAST STRESS DIASTOLIC BP: 84
STRESS ECHO LAST STRESS HR: 99
STRESS ECHO LAST STRESS SYSTOLIC BP: 164
STRESS ECHO TARGET HR: 153

## 2022-02-08 PROCEDURE — 78452 HT MUSCLE IMAGE SPECT MULT: CPT | Mod: 26 | Performed by: INTERNAL MEDICINE

## 2022-02-08 PROCEDURE — 93018 CV STRESS TEST I&R ONLY: CPT | Performed by: INTERNAL MEDICINE

## 2022-02-08 PROCEDURE — A9500 TC99M SESTAMIBI: HCPCS | Performed by: FAMILY MEDICINE

## 2022-02-08 PROCEDURE — 250N000011 HC RX IP 250 OP 636: Performed by: FAMILY MEDICINE

## 2022-02-08 PROCEDURE — 343N000001 HC RX 343: Performed by: FAMILY MEDICINE

## 2022-02-08 PROCEDURE — 78452 HT MUSCLE IMAGE SPECT MULT: CPT

## 2022-02-08 PROCEDURE — 93017 CV STRESS TEST TRACING ONLY: CPT | Performed by: INTERNAL MEDICINE

## 2022-02-08 PROCEDURE — 93016 CV STRESS TEST SUPVJ ONLY: CPT | Performed by: INTERNAL MEDICINE

## 2022-02-08 RX ORDER — ALBUTEROL SULFATE 0.83 MG/ML
2.5 SOLUTION RESPIRATORY (INHALATION)
Status: DISCONTINUED | OUTPATIENT
Start: 2022-02-08 | End: 2022-02-08 | Stop reason: HOSPADM

## 2022-02-08 RX ORDER — CAFFEINE CITRATE 20 MG/ML
60 SOLUTION INTRAVENOUS
Status: DISCONTINUED | OUTPATIENT
Start: 2022-02-08 | End: 2022-02-08 | Stop reason: HOSPADM

## 2022-02-08 RX ORDER — AMINOPHYLLINE 25 MG/ML
50 INJECTION, SOLUTION INTRAVENOUS
Status: DISCONTINUED | OUTPATIENT
Start: 2022-02-08 | End: 2022-02-08 | Stop reason: HOSPADM

## 2022-02-08 RX ORDER — REGADENOSON 0.08 MG/ML
0.4 INJECTION, SOLUTION INTRAVENOUS ONCE
Status: COMPLETED | OUTPATIENT
Start: 2022-02-08 | End: 2022-02-08

## 2022-02-08 RX ORDER — CAFFEINE 200 MG
200 TABLET ORAL
Status: DISCONTINUED | OUTPATIENT
Start: 2022-02-08 | End: 2022-02-08 | Stop reason: HOSPADM

## 2022-02-08 RX ADMIN — Medication 31.9 MILLICURIE: at 10:40

## 2022-02-08 RX ADMIN — REGADENOSON 0.4 MG: 0.08 INJECTION, SOLUTION INTRAVENOUS at 10:35

## 2022-02-08 RX ADMIN — Medication 8.5 MCI.: at 09:27

## 2022-02-08 NOTE — LETTER
February 15, 2022      Razia Dos Santos  [address]  [place] [address]        Dear ,    We are writing to inform you of your test results.    Your heart test was normal.  This does not show any evidence for blocked arteries in the heart or any risk of heart attack, etc.  If you have more questions about that we can discuss this in the clinic or you can feel free to call my office.       Resulted Orders   Nuclear Med with Lexiscan (Stress Test)   Result Value Ref Range    Pharmacologic Protocol Lexiscan     Test Type Pharmacological     Baseline HR 72     Baseline Systolic      Baseline Diastolic BP 86     Last Stress HR 99     Last Stress Systolic      Last Stress Diastolic BP 84     Target      PERCENT HR 85%     ST Deviation Elevation  mm     Deviation Time II -0.7mm     ST Elevation Amount V2 0.4mm     ST Deviation Amount he aVR -0.4mm     Final Resting /85     Final Resting HR 89     Max Treadmill Speed 0.0     Max Treadmill Grade 0.0     Peak Systolic /85     Peak Diastolic /85     Max HR  109     Stress Phase Resting     Stress Resting Pt Position MANUAL EVENT     Current HR 64     Current /86     Stress Phase Stress     Stage Minute EXE 00:00     Exercise Stage STAGE 2     Current HR 65     Current /86     Stress Phase Stress     Stage Minute EXE 01:00     Exercise Stage STAGE 3     Current HR 75     Current /86     Stress Phase Stress     Stage Minute EXE 01:48     Exercise Stage STAGE 3     Current      Current /85     Stress Phase Stress     Stage Minute EXE 02:00     Exercise Stage STAGE 4     Current      Current /85     Stress Phase Stress     Stage Minute EXE 02:55     Exercise Stage STAGE 4     Current      Current /84     Stress Phase Stress     Stage Minute EXE 03:00     Exercise Stage STAGE 5     Current      Current /84     Stress Phase Stress     Stage Minute EXE 04:00     Exercise  Stage STAGE 6     Current HR 99     Current /84     Stress Phase Stress     Stage Minute EXE 04:00     Exercise Stage STAGE 6     Current HR 99     Current /84     Stress Phase Recovery     Stage Minute REC 00:45     Exercise Stage Recovery     Current HR 95     Current /74     Stress Phase Recovery     Stage Minute REC 00:59     Exercise Stage Recovery     Current HR 96     Current /74     Stress Phase Recovery     Stage Minute REC 01:59     Exercise Stage Recovery     Current HR 91     Current /74     Stress Phase Recovery     Stage Minute REC 02:15     Exercise Stage Recovery     Current HR 88     Current /85     Stress Phase Recovery     Stage Minute REC 02:59     Exercise Stage Recovery     Current HR 91     Current /85     Stress Phase Recovery     Stage Minute REC 03:59     Exercise Stage Recovery     Current HR 88     Current /85     Stress Phase Recovery     Stage Minute REC 04:04     Exercise Stage Recovery     Current HR 89     Current /85     Max Predicted HR  71 %    Rate Pressure Product 17,876.0     Narrative       1.Negative pharmacological regadenoson ECG for ischemia.     2.The nuclear stress test is negative for inducible myocardial ischemia   or infarction.     3.The left ventricular ejection fraction at stress is greater than 70%.     4.There is no prior study for comparison.         If you have any questions or concerns, please call the clinic at the number listed above.       Sincerely,      Walter He MD

## 2022-02-15 NOTE — RESULT ENCOUNTER NOTE
Your heart test was normal.  This does not show any evidence for blocked arteries in the heart or any risk of heart attack, etc.  If you have more questions about that we can discuss this in the clinic or you can feel free to call my office.

## 2022-03-08 ENCOUNTER — OFFICE VISIT (OUTPATIENT)
Dept: FAMILY MEDICINE | Facility: CLINIC | Age: 67
End: 2022-03-08
Payer: COMMERCIAL

## 2022-03-08 VITALS
HEART RATE: 87 BPM | BODY MASS INDEX: 26.86 KG/M2 | TEMPERATURE: 98.4 F | SYSTOLIC BLOOD PRESSURE: 134 MMHG | DIASTOLIC BLOOD PRESSURE: 83 MMHG | WEIGHT: 141 LBS | OXYGEN SATURATION: 99 % | RESPIRATION RATE: 16 BRPM

## 2022-03-08 DIAGNOSIS — R53.83 FATIGUE, UNSPECIFIED TYPE: ICD-10-CM

## 2022-03-08 DIAGNOSIS — R73.03 PRE-DIABETES: Primary | ICD-10-CM

## 2022-03-08 DIAGNOSIS — I10 BENIGN ESSENTIAL HYPERTENSION: ICD-10-CM

## 2022-03-08 DIAGNOSIS — J30.89 SEASONAL ALLERGIC RHINITIS DUE TO OTHER ALLERGIC TRIGGER: ICD-10-CM

## 2022-03-08 DIAGNOSIS — E78.00 PURE HYPERCHOLESTEROLEMIA: ICD-10-CM

## 2022-03-08 LAB
ALBUMIN SERPL-MCNC: 4.3 G/DL (ref 3.5–5)
ALP SERPL-CCNC: 75 U/L (ref 45–120)
ALT SERPL W P-5'-P-CCNC: 18 U/L (ref 0–45)
ANION GAP SERPL CALCULATED.3IONS-SCNC: 10 MMOL/L (ref 5–18)
AST SERPL W P-5'-P-CCNC: 21 U/L (ref 0–40)
BILIRUB SERPL-MCNC: 0.5 MG/DL (ref 0–1)
BUN SERPL-MCNC: 14 MG/DL (ref 8–22)
CALCIUM SERPL-MCNC: 9.9 MG/DL (ref 8.5–10.5)
CHLORIDE BLD-SCNC: 106 MMOL/L (ref 98–107)
CO2 SERPL-SCNC: 26 MMOL/L (ref 22–31)
CREAT SERPL-MCNC: 0.85 MG/DL (ref 0.6–1.1)
ERYTHROCYTE [DISTWIDTH] IN BLOOD BY AUTOMATED COUNT: 12.4 % (ref 10–15)
GFR SERPL CREATININE-BSD FRML MDRD: 75 ML/MIN/1.73M2
GLUCOSE BLD-MCNC: 114 MG/DL (ref 70–125)
HBA1C MFR BLD: 5.8 % (ref 0–5.6)
HCT VFR BLD AUTO: 41.6 % (ref 35–47)
HGB BLD-MCNC: 13.7 G/DL (ref 11.7–15.7)
MCH RBC QN AUTO: 29.5 PG (ref 26.5–33)
MCHC RBC AUTO-ENTMCNC: 32.9 G/DL (ref 31.5–36.5)
MCV RBC AUTO: 90 FL (ref 78–100)
PLATELET # BLD AUTO: 219 10E3/UL (ref 150–450)
POTASSIUM BLD-SCNC: 4.6 MMOL/L (ref 3.5–5)
PROT SERPL-MCNC: 7.9 G/DL (ref 6–8)
RBC # BLD AUTO: 4.65 10E6/UL (ref 3.8–5.2)
SODIUM SERPL-SCNC: 142 MMOL/L (ref 136–145)
TSH SERPL DL<=0.005 MIU/L-ACNC: 1.66 UIU/ML (ref 0.3–5)
WBC # BLD AUTO: 5.1 10E3/UL (ref 4–11)

## 2022-03-08 PROCEDURE — 36415 COLL VENOUS BLD VENIPUNCTURE: CPT | Performed by: FAMILY MEDICINE

## 2022-03-08 PROCEDURE — 99214 OFFICE O/P EST MOD 30 MIN: CPT | Performed by: FAMILY MEDICINE

## 2022-03-08 PROCEDURE — 85027 COMPLETE CBC AUTOMATED: CPT | Performed by: FAMILY MEDICINE

## 2022-03-08 PROCEDURE — 80053 COMPREHEN METABOLIC PANEL: CPT | Performed by: FAMILY MEDICINE

## 2022-03-08 PROCEDURE — 84443 ASSAY THYROID STIM HORMONE: CPT | Performed by: FAMILY MEDICINE

## 2022-03-08 PROCEDURE — 83036 HEMOGLOBIN GLYCOSYLATED A1C: CPT | Performed by: FAMILY MEDICINE

## 2022-03-08 RX ORDER — FLUTICASONE PROPIONATE 50 MCG
1 SPRAY, SUSPENSION (ML) NASAL DAILY
Qty: 15.8 ML | Refills: 11 | Status: SHIPPED | OUTPATIENT
Start: 2022-03-08

## 2022-03-08 NOTE — PATIENT INSTRUCTIONS
Follow up in 6 months for your blood pressure.    Come back in a few weeks if the fatigue is getting worse.

## 2022-03-08 NOTE — PROGRESS NOTES
"Assessment & Plan     Pre-diabetes    - Hemoglobin A1c; Future    Pure hypercholesterolemia    - Comprehensive metabolic panel; Future    Benign essential hypertension      Fatigue, unspecified type    - TSH with free T4 reflex; Future  - CBC with platelets; Future    Seasonal allergic rhinitis due to other allergic trigger    - fluticasone (FLONASE) 50 MCG/ACT nasal spray; Spray 1 spray into both nostrils daily    Diagnosis or treatment significantly limited by social determinants of health - Language barrier  35 minutes spent on the date of the encounter doing chart review, patient visit and documentation        BMI:   Estimated body mass index is 26.86 kg/m  as calculated from the following:    Height as of 9/28/21: 1.543 m (5' 0.75\").    Weight as of this encounter: 64 kg (141 lb).     Walter He MD  [facility]    Options for treatment and/or follow-up care were reviewed with the patient. Razia Dos Santos was engaged and actively involved in the decision making process. She verbalized understanding of the options discussed and was satisfied with the final plan.    RTC in a few months for a routine physical or sooner if develops new or worsening symptoms.    Subjective: Razia Dos Santos is a 66 year old who is following up on her stress test.  This was reassuring.  She continues to feel some shortness of breath which at times she even feels when sitting still.    She is also reporting some tingling in the back of her throat and some drainage as well as sneezing.  This is a more recent problem which has been going on a few weeks.    She does not sleep the best; she has a hard time falling asleep and maybe gets 5 or 6 hours of sleep at night.  She is not wanting to take anything for that.  She is just mentioning it.  PMHX/PSHX/MEDS/ALLERGIES/SHX/FHX reviewed and updated in Epic.   ROS:   General: No fevers, chills; some insomnia with hard time falling aslep  Head: No headache   CV: No chest pain or " palpitations.   Resp: No cough.  GI: No nausea or vomiting.    Objective: /83   Pulse 87   Temp 98.4  F (36.9  C) (Oral)   Resp 16   Wt 64 kg (141 lb)   SpO2 99%   BMI 26.86 kg/m     Gen: Well nourished and in NAD   HEENT: TMs normal color and landmarks, nasopharynx pink and moist, oropharynx pink and moist  CV: RRR - no murmurs, rubs, or gallups  Pulm: Clear to auscultation without wheezing or crackles  ABD: soft, nontender, BS intact  Extrem: no cyanosis, edema or clubbing   Psych: Euthymic

## 2022-03-08 NOTE — LETTER
March 16, 2022      Razia Dos Santos  [address]  [place] [address]        Dear ,    We are writing to inform you of your test results.    These results are within the normal range for you.  Please follow up in the clinic as directed.        Resulted Orders   Comprehensive metabolic panel   Result Value Ref Range    Sodium 142 136 - 145 mmol/L    Potassium 4.6 3.5 - 5.0 mmol/L    Chloride 106 98 - 107 mmol/L    Carbon Dioxide (CO2) 26 22 - 31 mmol/L    Anion Gap 10 5 - 18 mmol/L    Urea Nitrogen 14 8 - 22 mg/dL    Creatinine 0.85 0.60 - 1.10 mg/dL    Calcium 9.9 8.5 - 10.5 mg/dL    Glucose 114 70 - 125 mg/dL    Alkaline Phosphatase 75 45 - 120 U/L    AST 21 0 - 40 U/L    ALT 18 0 - 45 U/L    Protein Total 7.9 6.0 - 8.0 g/dL    Albumin 4.3 3.5 - 5.0 g/dL    Bilirubin Total 0.5 0.0 - 1.0 mg/dL    GFR Estimate 75 >60 mL/min/1.73m2      Comment:      Effective December 21, 2021 eGFRcr in adults is calculated using the 2021 CKD-EPI creatinine equation which includes age and gender (Lary et al., NE, DOI: 10.1056/ELVYib9086081)   Hemoglobin A1c   Result Value Ref Range    Hemoglobin A1C 5.8 (H) 0.0 - 5.6 %      Comment:      Normal <5.7%   Prediabetes 5.7-6.4%    Diabetes 6.5% or higher     Note: Adopted from ADA consensus guidelines.   CBC with platelets   Result Value Ref Range    WBC Count 5.1 4.0 - 11.0 10e3/uL    RBC Count 4.65 3.80 - 5.20 10e6/uL    Hemoglobin 13.7 11.7 - 15.7 g/dL    Hematocrit 41.6 35.0 - 47.0 %    MCV 90 78 - 100 fL    MCH 29.5 26.5 - 33.0 pg    MCHC 32.9 31.5 - 36.5 g/dL    RDW 12.4 10.0 - 15.0 %    Platelet Count 219 150 - 450 10e3/uL   TSH with free T4 reflex   Result Value Ref Range    TSH 1.66 0.30 - 5.00 uIU/mL       If you have any questions or concerns, please call the clinic at the number listed above.       Sincerely,      Walter He MD

## 2022-03-08 NOTE — NURSING NOTE
Due to patient being non-English speaking/uses sign language, an  was used for this visit. Only for face-to-face interpretation by an external agency, date and length of interpretation can be found on the scanned worksheet.     name: Alison   Agency: [employer]  Language: Canadian   Telephone number: [phone]  Type of interpretation: Face-to-face, spoken

## 2022-04-19 ENCOUNTER — ANCILLARY PROCEDURE (OUTPATIENT)
Dept: MAMMOGRAPHY | Facility: CLINIC | Age: 67
End: 2022-04-19
Attending: FAMILY MEDICINE
Payer: COMMERCIAL

## 2022-04-19 DIAGNOSIS — Z12.31 ENCOUNTER FOR SCREENING MAMMOGRAM FOR BREAST CANCER: ICD-10-CM

## 2022-04-19 PROCEDURE — 77067 SCR MAMMO BI INCL CAD: CPT

## 2022-05-10 ENCOUNTER — OFFICE VISIT (OUTPATIENT)
Dept: FAMILY MEDICINE | Facility: CLINIC | Age: 67
End: 2022-05-10
Payer: COMMERCIAL

## 2022-05-10 VITALS
TEMPERATURE: 98.2 F | HEART RATE: 84 BPM | OXYGEN SATURATION: 97 % | SYSTOLIC BLOOD PRESSURE: 144 MMHG | DIASTOLIC BLOOD PRESSURE: 78 MMHG | HEIGHT: 61 IN | BODY MASS INDEX: 26.81 KG/M2 | WEIGHT: 142 LBS | RESPIRATION RATE: 20 BRPM

## 2022-05-10 DIAGNOSIS — Z98.890 S/P COLONOSCOPY: ICD-10-CM

## 2022-05-10 DIAGNOSIS — I10 BENIGN ESSENTIAL HYPERTENSION: Chronic | ICD-10-CM

## 2022-05-10 DIAGNOSIS — H40.89 OTHER GLAUCOMA OF BOTH EYES: ICD-10-CM

## 2022-05-10 DIAGNOSIS — Z23 HIGH PRIORITY FOR 2019-NCOV VACCINE: ICD-10-CM

## 2022-05-10 DIAGNOSIS — R73.03 PRE-DIABETES: Primary | ICD-10-CM

## 2022-05-10 DIAGNOSIS — G31.84 MILD COGNITIVE IMPAIRMENT: ICD-10-CM

## 2022-05-10 PROCEDURE — G0439 PPPS, SUBSEQ VISIT: HCPCS | Performed by: FAMILY MEDICINE

## 2022-05-10 PROCEDURE — 90715 TDAP VACCINE 7 YRS/> IM: CPT | Performed by: FAMILY MEDICINE

## 2022-05-10 PROCEDURE — 0054A COVID-19,PF,PFIZER (12+ YRS): CPT | Performed by: FAMILY MEDICINE

## 2022-05-10 PROCEDURE — 90471 IMMUNIZATION ADMIN: CPT | Performed by: FAMILY MEDICINE

## 2022-05-10 PROCEDURE — 90472 IMMUNIZATION ADMIN EACH ADD: CPT | Performed by: FAMILY MEDICINE

## 2022-05-10 PROCEDURE — 91305 COVID-19,PF,PFIZER (12+ YRS): CPT | Performed by: FAMILY MEDICINE

## 2022-05-10 PROCEDURE — 90670 PCV13 VACCINE IM: CPT | Performed by: FAMILY MEDICINE

## 2022-05-10 NOTE — PROGRESS NOTES
65+ Annual Wellness Visit         HPI     This 66 year old female presents as an established patient  Walter He who presents for a Subsequent Medicare Wellness Visit    Other issues patient wants to be addressed today:      Still having episodes of feeling tired.  This is best described as tired or fatigued.  She also will have SOB at this time and it lasts about 30 minutes.  This happens every few days to every few weeks.  Chief Complaint   Patient presents with     Medicare Visit         Patient Active Problem List   Diagnosis     Pure hypercholesterolemia     Insomnia     Nonspecific reaction to tuberculin skin test without active tuberculosis     Migraine     Abnormal Pap smear of cervix     Osteopenia     Glaucoma     Benign essential hypertension     Pre-diabetes     Chronic rhinitis     Breast cancer screening     Benign paroxysmal positional vertigo, unspecified laterality       Past Medical History:   Diagnosis Date     HPV (low risk) 3/14/2013    3/2013:  Normal pap, positive for low risk HPV.   H/O ASCUS in 2005, +HPV of undetermined risk in 2012 No history of high grade lesion     Hypertension         Family History   Problem Relation Age of Onset     No Known Problems Mother      No Known Problems Father      No Known Problems Maternal Grandmother      No Known Problems Maternal Grandfather      No Known Problems Paternal Grandmother      No Known Problems Paternal Grandfather      Heart Disease Brother      No Known Problems Sister      No Known Problems Son      No Known Problems Daughter      No Known Problems Maternal Half-Brother      No Known Problems Maternal Half-Sister      No Known Problems Paternal Half-Brother      No Known Problems Paternal Half-Sister      No Known Problems Niece      No Known Problems Nephew      No Known Problems Cousin      No Known Problems Other      Diabetes No family hx of      Coronary Artery Disease No family hx of      Hypertension No family hx of       Hyperlipidemia No family hx of      Breast Cancer No family hx of      Cancer - colorectal No family hx of      Ovarian Cancer No family hx of      Prostate Cancer No family hx of      Other Cancer No family hx of      Mental Illness No family hx of      Cerebrovascular Disease No family hx of      Anesthesia Reaction No family hx of      Asthma No family hx of      Osteoporosis No family hx of      Known Genetic Syndrome No family hx of      Obesity No family hx of      Unknown/Adopted No family hx of      Cancer No family hx of      Kidney Disease No family hx of      Thrombosis No family hx of      Arthritis No family hx of      Thyroid Disease No family hx of      Depression No family hx of      Mental Illness No family hx of      Substance Abuse No family hx of      Cystic Fibrosis No family hx of      Early Death No family hx of      Coronary Artery Disease Early Onset No family hx of      Heart Failure No family hx of      Bleeding Diathesis No family hx of      Dementia No family hx of      Uterine Cancer No family hx of      Colorectal Cancer No family hx of      Pancreatic Cancer No family hx of      Lung Cancer No family hx of      Melanoma No family hx of      Autoimmune Disease No family hx of      Genetic Disorder No family hx of          Problem List, Family History and past Medical History reviewed and unchanged/updated.      Visual Acuity:  Right Eye: 10/12.5   Left Eye: 10/12.5  Both Eyes: 10/10        FALL RISK ASSESSMENT 3/23/2021 12/5/2019   Fallen 2 or more times in the past year? No No   Any fall with injury in the past year? No No            Health Risk Assessment / Review of Systems     Constitutional: Any fevers or night sweats? No     Eyes:  Vision problems    YES     Hearing Do you feel you have hearing loss?  No     Cardiovascular: Any chest pain, fast or irregular heart beat, calf pain with walking?     No           Respiratory:   Any breathing problems or cough?   No      Gastrointestinal: Any stomach or stool problems?   No      Genitourinary: Do you have difficulty controlling urination?   No     Muscles and Joints: Any joint stiffness or soreness?    YES     Skin: Any concerning lesions or moles?   No     Nervous System: Any loss of strength or feeling, numbness or tingling, shaking, dizziness, or headache?  No     Mental Health: Any depression, anxiety or problems sleeping?    No     Cognition: Do you have any problems with your memory?   YES forgetful    PHQ-2 Score:   PHQ-2 ( 1999 Pfizer) 2/1/2022 1/5/2022   Q1: Little interest or pleasure in doing things 1 0   Q2: Feeling down, depressed or hopeless 1 0   PHQ-2 Score 2 0   PHQ-2 Total Score (12-17 Years)- Positive if 3 or more points; Administer PHQ-A if positive - -       PHQ-9 Score:   [facility] Follow-up to [facility] 1/22/2019   PHQ-9 9. Suicide Ideation past 2 weeks Several days   Thoughts of suicide or self harm in past 2 weeks No   PHQ-9 Safety concerns? No            Medical Care     What other specialists or organizations are involved in your medical care?  None  Patient Care Team       Relationship Specialty Notifications Start End    Walter He MD PCP - General Family Medicine  7/1/21     Phone: [phone] Fax: [phone] [address] [facility] [address]    Walter He MD Assigned PCP   7/4/21     Phone: [phone] Fax: [phone] [address] [facility] [address]                 Social History / Home Safety     Social History     Tobacco Use     Smoking status: Never Smoker     Smokeless tobacco: Never Used   Substance Use Topics     Alcohol use: No     Marital Status:  Who lives in your household?     Does your home have any of the following safety concerns? Loose rugs in the hallway, no grab bars in the bathroom, no handrails on the stairs or have poorly lit areas?  No     Do you feel threatened or controlled by a partner, ex-partner or anyone in your life? No     Has anyone  "hurt you physically, for example by pushing, hitting, slapping or kicking you   or forcing you to have sex? No          Functional Status     Do you need help with dressing yourself, bathing, or walking?No     Do you need help with the phone, transportation, shopping, preparing meals, housework, laundry, medications or managing money?No       Risk Behaviors and Healthy Habits     History   Smoking Status     Never Smoker   Smokeless Tobacco     Never Used     How many servings of fruits and vegetables do you eat a day? 2-3    Exercise: everyday hand exercise     Do you frequently drive without a seatbelt? No     Do you use any other drugs? No         Do you use alcohol?No      Frailty Assessment            1. By yourself and note using aids, do you have difficulty walking up 10 steps without resting?  No  (1 for Yes, 0 for No)    2. By yourself and not using mobility aids, do you have any difficulty walking several hundred yards? No  (1 for Yes, 0 for No)    3. Have you lost 10 or more pounds unintentionally in the previous year? No  (If \"Yes\" and >5% weight loss, then score 1.  Score 0, if <5% weight loss or \"No\" weight loss)    4. How much of the times during the past 3 weeks did you feel tired? 5. None of the time (\"1\" or \"2\" are scored 1, others 0)    5.  A doctor told the patient they had the following illnesses:  High blood pressure (0-4 = score 0, 5-11= score 1)              Frailty Assessment              1. (1 for Yes, 0 for No)    2. (1 for Yes, 0 for No)    3. (If \"Yes\" and >5% weight loss, then score 1.  Score 0, if <5% weight loss or \"No\" weight loss)    4. (\"1\" or \"2\" are scored 1, others 0)    5. Count number of illnesses. (0-4 = score 0, 5-11= score 1)    Total score: 0    Frailty screen score (3-5 = frail; consider interdisciplinary assessment and care.  1-2 = prefrail; at risk for adverse health events; 0 = robust)      EVALUATION OF COGNITIVE FUNCTION "     Mood/affect:Normal  Appearance:Normal  Family member/caregiver input: Normal          Mini Cog Scoring   2 points   Clock Draw Test result:  Normal     Cognitive screen is:Negative    60 second animal naming is 12. Highest level of education was 12 grade.       Other Assessments     CV Risk based on Pooled Cohort Risk (consider assessing every 4-6 years; consider statin in patients with 10-yr risk > 7.5%):   The 10-year ASCVD risk score (Felix RAVI Jr., et al., 2013) is: 11.1%    Values used to calculate the score:      Age: 66 years      Sex: Female      Is Non- : No      Diabetic: No      Tobacco smoker: No      Systolic Blood Pressure: 144 mmHg      Is BP treated: Yes      HDL Cholesterol: 48.3 mg/dL      Total Cholesterol: 167.4 mg/dL    Advance Directives: Discussed with patient and family as appropriate.  Has patient completed advance directives and/or a living will?  no           Immunization History   Administered Date(s) Administered     COVID-19,PF,Pfizer (12+ Yrs) 02/20/2021, 03/13/2021, 12/02/2021     Flu, Unspecified 12/13/2011     HEPA 04/24/2006, 11/13/2006     HepA-Adult 04/24/2006, 11/13/2006     HepB 04/24/2006, 11/13/2006, 12/13/2011     HepB-Adult 04/24/2006, 08/24/2006, 11/13/2006     Influenza (IIV3) PF 10/16/2007, 12/13/2011     Influenza Vaccine, 6+MO IM (QUADRIVALENT W/PRESERVATIVES) 10/15/2015, 12/07/2017, 11/09/2018     Influenza, Quad, High Dose, Pf, 65yr+ (Fluzone HD) 09/11/2020, 09/28/2021     MMR 12/13/2011     Pneumococcal 23 valent 08/06/2020     TD (ADULT, 7+) 11/09/1999     TDAP Vaccine (Boostrix) 12/13/2011     Typhoid IM 04/24/2006     Reviewed Immunization Record Today         Physical Exam     Vitals: There were no vitals taken for this visit.  BMI= There is no height or weight on file to calculate BMI.  EXAM:  Cardiovascular: negative, PMI normal. No lifts, heaves, or thrills. RRR. No murmurs, clicks gallops or rub  Respiratory: negative  Psychiatric:  mentation appears normal and affect normal/bright  LYMPH: Normal cervical lymph nodes        Assessment and Plan       Reviewed Preventive Services and Plan form with patient as specified in Patient Instructions.      Razia was seen today for medicare visit and imm/inj.    Diagnoses and all orders for this visit:    Pre-diabetes    Benign essential hypertension    Other glaucoma of both eyes    Mild cognitive impairment    S/P colonoscopy    High priority for 2019-nCoV vaccine  -     COVID-19,PF,PFIZER (12+ Yrs GRAY LABEL)    Other orders  -     TDAP VACCINE (Adacel, Boostrix)  [[id number]]  -     Pneumococcal vaccine 13 valent PCV13 IM (Prevnar) [[id number]]          Options for treatment and follow-up care were reviewed with the Razia AMBRIZ Le and/or guardian engaged in the decision making process and verbalized understanding of the options discussed and agreed with the final plan.    Walter He MD

## 2022-05-10 NOTE — NURSING NOTE
Due to patient being non-English speaking/uses sign language, an  was used for this visit. Only for face-to-face interpretation by an external agency, date and length of interpretation can be found on the scanned worksheet.     name: Nick Barajas  Agency: [employer]  Language: Swedish   Telephone number: [phone]  Type of interpretation: Face-to-face, spoken

## 2022-12-06 ENCOUNTER — OFFICE VISIT (OUTPATIENT)
Dept: FAMILY MEDICINE | Facility: CLINIC | Age: 67
End: 2022-12-06
Payer: COMMERCIAL

## 2022-12-06 VITALS
SYSTOLIC BLOOD PRESSURE: 144 MMHG | WEIGHT: 140 LBS | TEMPERATURE: 98 F | RESPIRATION RATE: 20 BRPM | HEART RATE: 81 BPM | DIASTOLIC BLOOD PRESSURE: 81 MMHG | OXYGEN SATURATION: 97 % | BODY MASS INDEX: 26.45 KG/M2

## 2022-12-06 DIAGNOSIS — R12 WATERBRASH SYMPTOM: Primary | ICD-10-CM

## 2022-12-06 DIAGNOSIS — I10 BENIGN ESSENTIAL HYPERTENSION: ICD-10-CM

## 2022-12-06 DIAGNOSIS — Z23 NEED FOR PROPHYLACTIC VACCINATION AND INOCULATION AGAINST INFLUENZA: ICD-10-CM

## 2022-12-06 DIAGNOSIS — Z23 HIGH PRIORITY FOR 2019-NCOV VACCINE: ICD-10-CM

## 2022-12-06 PROCEDURE — 0124A COVID-19 VACCINE BIVALENT BOOSTER 12+ (PFIZER): CPT | Performed by: FAMILY MEDICINE

## 2022-12-06 PROCEDURE — 91312 COVID-19 VACCINE BIVALENT BOOSTER 12+ (PFIZER): CPT | Performed by: FAMILY MEDICINE

## 2022-12-06 PROCEDURE — 99213 OFFICE O/P EST LOW 20 MIN: CPT | Mod: 25 | Performed by: FAMILY MEDICINE

## 2022-12-06 PROCEDURE — 90686 IIV4 VACC NO PRSV 0.5 ML IM: CPT | Performed by: FAMILY MEDICINE

## 2022-12-06 PROCEDURE — G0008 ADMIN INFLUENZA VIRUS VAC: HCPCS | Performed by: FAMILY MEDICINE

## 2022-12-06 RX ORDER — LISINOPRIL 10 MG/1
10 TABLET ORAL DAILY
Qty: 90 TABLET | Refills: 3 | Status: SHIPPED | OUTPATIENT
Start: 2022-12-06 | End: 2023-12-12 | Stop reason: DRUGHIGH

## 2022-12-06 RX ORDER — LISINOPRIL 5 MG/1
5 TABLET ORAL DAILY
Qty: 90 TABLET | Refills: 3 | Status: SHIPPED | OUTPATIENT
Start: 2022-12-06 | End: 2023-12-12 | Stop reason: DRUGHIGH

## 2022-12-06 RX ORDER — SIMVASTATIN 20 MG
20 TABLET ORAL AT BEDTIME
Qty: 90 TABLET | Refills: 3 | Status: SHIPPED | OUTPATIENT
Start: 2022-12-06 | End: 2024-02-20

## 2022-12-06 NOTE — PROGRESS NOTES
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      (R12) Waterbrash symptom  (primary encounter diagnosis)  Comment: Patient has been having bitter taste in her mouth for the last few months. Taste is unlike any reflux she's experienced. Hasn't seen a dentist in a long time. Wasn't aware of the leukoplakia in her mouth that we found on exam. We discussed seeing a dentist about the leukoplakia who could take a biopsy of the lesion and assess for malignancy. Patient was agreeable to the plan.  Plan:   -omeprazole (PRILOSEC) 20 MG DR capsule  -gave list of dentists to patient      (I10) Benign essential hypertension  Comment: Takes lisinopril twice daily. No issues. Reports 120/80 average pressures at home with her cuff. We discussed white coat hypertension and offered to calibrate her blood pressure cuff if so desired. Will follow up in 6-months for med check.  Plan: lisinopril (ZESTRIL) 5 MG tablet, lisinopril         (ZESTRIL) 10 MG tablet, simvastatin (ZOCOR) 20         MG tablet    (Z23) Need for prophylactic vaccination and inoculation against influenza  Plan: INFLUENZA VACCINE IM > 6 MONTHS VALENT IIV4         (AFLURIA/FLUZONE)    (Z23) High priority for 2019-nCoV vaccine  Plan: COVID-19,PF,PFIZER BOOSTER BIVALENT 12+Yrs      Follow up in 6 months for medication check. Will follow up sooner if PPIs do not help symptoms and dental referral is fruitless.     Phoebe Uribe  Medical Student, MS3  [facility]    Walter He MD  [facility]    Angela Randle is a 67 year old accompanied by , presenting for the following health issues:  Hypertension, Immunization (Covid and flu), Imm/Inj (Flu Shot), and Imm/Inj (COVID-19 VACCINE)      HPI   Past few months bitter taste in morning every few days and rarely  in day. No new meds in the last year. No change to diet. She rarely goes to dentist -- unsure of last visit. No other symptoms that she's noticed. Sometimes gets reflux if she eats a lot of food but it is more of a sour taste, unlike this bitter taste.    No mouth pain nor lesions she's noticed. No issues chewing or swallowing. No headaches.     Takes lisinoprill twice a day (10mg, and 5mg). No issues taking the med, no side effects. Taking it for years. Tracks BPs at home, usually 120/80 average at home with blood pressure cuff.    Review of Systems   Constitutional, HEENT, cardiovascular, pulmonary, gi and gu systems are negative, except as otherwise noted.      Objective    BP (!) 144/81   Pulse 81   Temp 98  F (36.7  C) (Oral)   Resp 20   Wt 63.5 kg (140 lb)   SpO2 97%   BMI 26.45 kg/m    Body mass index is 26.45 kg/m .  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 No pain when teeth are tapped with tongue depressor. Normal cephalic/atraumatic, oral mucous membranes moist  NECK: no adenopathy, no asymmetry, masses, or scars and thyroid normal to palpation  RESP: lungs clear to auscultation - no rales, rhonchi or wheezes  CV: regular rate and rhythm, normal S1 S2, no S3 or S4, no murmur, click or rub, no peripheral edema and peripheral pulses strong  MS: no gross musculoskeletal defects noted, no edema  PSYCH: mentation appears normal, affect normal/bright    Preceptor Attestation:   I was present with the medical student who participated in the service and in the documentation of this note. I have verified the history and personally performed the physical exam and medical decision making. I have verified the content of the note, which  accurately reflects my assessment of the patient and the plan of care.   Supervising Physician:  Walter He MD.

## 2023-01-15 DIAGNOSIS — E55.9 VITAMIN D DEFICIENCY: ICD-10-CM

## 2023-01-16 RX ORDER — CHOLECALCIFEROL (VITAMIN D3) 25 MCG
TABLET ORAL
Qty: 100 TABLET | Refills: 3 | Status: SHIPPED | OUTPATIENT
Start: 2023-01-16 | End: 2024-02-21

## 2023-06-09 ENCOUNTER — OFFICE VISIT (OUTPATIENT)
Dept: FAMILY MEDICINE | Facility: CLINIC | Age: 68
End: 2023-06-09
Payer: COMMERCIAL

## 2023-06-09 VITALS
BODY MASS INDEX: 26.43 KG/M2 | DIASTOLIC BLOOD PRESSURE: 85 MMHG | OXYGEN SATURATION: 98 % | RESPIRATION RATE: 16 BRPM | SYSTOLIC BLOOD PRESSURE: 135 MMHG | WEIGHT: 140 LBS | HEART RATE: 75 BPM | HEIGHT: 61 IN | TEMPERATURE: 98.5 F

## 2023-06-09 DIAGNOSIS — E78.00 PURE HYPERCHOLESTEROLEMIA: ICD-10-CM

## 2023-06-09 DIAGNOSIS — M85.88 OSTEOPENIA OF LUMBAR SPINE: ICD-10-CM

## 2023-06-09 DIAGNOSIS — Z12.31 ENCOUNTER FOR SCREENING MAMMOGRAM FOR BREAST CANCER: ICD-10-CM

## 2023-06-09 DIAGNOSIS — R73.03 PRE-DIABETES: ICD-10-CM

## 2023-06-09 DIAGNOSIS — K13.21 LEUKOPLAKIA OF ORAL MUCOSA, INCLUDING TONGUE: ICD-10-CM

## 2023-06-09 DIAGNOSIS — Z78.0 MENOPAUSE: Primary | ICD-10-CM

## 2023-06-09 DIAGNOSIS — I10 BENIGN ESSENTIAL HYPERTENSION: Chronic | ICD-10-CM

## 2023-06-09 DIAGNOSIS — Z71.89 LIVING WILL, COUNSELING/DISCUSSION: ICD-10-CM

## 2023-06-09 LAB
ALBUMIN SERPL BCG-MCNC: 4.8 G/DL (ref 3.5–5.2)
ALP SERPL-CCNC: 59 U/L (ref 35–104)
ALT SERPL W P-5'-P-CCNC: 25 U/L (ref 10–35)
ANION GAP SERPL CALCULATED.3IONS-SCNC: 11 MMOL/L (ref 7–15)
AST SERPL W P-5'-P-CCNC: 34 U/L (ref 10–35)
BILIRUB SERPL-MCNC: 0.6 MG/DL
BUN SERPL-MCNC: 10.8 MG/DL (ref 8–23)
CALCIUM SERPL-MCNC: 10.1 MG/DL (ref 8.8–10.2)
CHLORIDE SERPL-SCNC: 104 MMOL/L (ref 98–107)
CHOLEST SERPL-MCNC: 163 MG/DL
CREAT SERPL-MCNC: 0.88 MG/DL (ref 0.51–0.95)
DEPRECATED HCO3 PLAS-SCNC: 28 MMOL/L (ref 22–29)
GFR SERPL CREATININE-BSD FRML MDRD: 71 ML/MIN/1.73M2
GLUCOSE SERPL-MCNC: 104 MG/DL (ref 70–99)
HBA1C MFR BLD: 5.9 % (ref 0–5.6)
HDLC SERPL-MCNC: 46 MG/DL
LDLC SERPL CALC-MCNC: 80 MG/DL
NONHDLC SERPL-MCNC: 117 MG/DL
POTASSIUM SERPL-SCNC: 4.2 MMOL/L (ref 3.4–5.3)
PROT SERPL-MCNC: 7.9 G/DL (ref 6.4–8.3)
SODIUM SERPL-SCNC: 143 MMOL/L (ref 136–145)
TRIGL SERPL-MCNC: 186 MG/DL

## 2023-06-09 PROCEDURE — 0121A COVID-19 BIVALENT 12+ (PFIZER): CPT | Performed by: FAMILY MEDICINE

## 2023-06-09 PROCEDURE — 80053 COMPREHEN METABOLIC PANEL: CPT | Performed by: FAMILY MEDICINE

## 2023-06-09 PROCEDURE — 83036 HEMOGLOBIN GLYCOSYLATED A1C: CPT | Performed by: FAMILY MEDICINE

## 2023-06-09 PROCEDURE — 36415 COLL VENOUS BLD VENIPUNCTURE: CPT | Performed by: FAMILY MEDICINE

## 2023-06-09 PROCEDURE — 82306 VITAMIN D 25 HYDROXY: CPT | Performed by: FAMILY MEDICINE

## 2023-06-09 PROCEDURE — 80061 LIPID PANEL: CPT | Performed by: FAMILY MEDICINE

## 2023-06-09 PROCEDURE — G0439 PPPS, SUBSEQ VISIT: HCPCS | Performed by: FAMILY MEDICINE

## 2023-06-09 PROCEDURE — 91312 COVID-19 BIVALENT 12+ (PFIZER): CPT | Performed by: FAMILY MEDICINE

## 2023-06-09 RX ORDER — MULTIVITAMIN
1 CAPSULE ORAL DAILY
Qty: 90 CAPSULE | Refills: 0 | Status: SHIPPED | OUTPATIENT
Start: 2023-06-09

## 2023-06-09 RX ORDER — GUARN/MA-HUANG/P.GIN/S.GINSENG
1 TABLET ORAL 2 TIMES DAILY
Qty: 180 TABLET | Refills: 3 | Status: SHIPPED | OUTPATIENT
Start: 2023-06-09

## 2023-06-09 ASSESSMENT — ENCOUNTER SYMPTOMS
FEVER: 0
BREAST MASS: 0
NAUSEA: 0
ARTHRALGIAS: 1
HEADACHES: 0
HEARTBURN: 0
DYSURIA: 0
WEAKNESS: 0
DIZZINESS: 0
PARESTHESIAS: 0
DIARRHEA: 0
CHILLS: 0
SHORTNESS OF BREATH: 0
JOINT SWELLING: 1
EYE PAIN: 0
COUGH: 0
HEMATOCHEZIA: 0
SORE THROAT: 0
PALPITATIONS: 0
HEMATURIA: 0
MYALGIAS: 0
ABDOMINAL PAIN: 0
NERVOUS/ANXIOUS: 0
CONSTIPATION: 0
FREQUENCY: 0

## 2023-06-09 ASSESSMENT — ACTIVITIES OF DAILY LIVING (ADL): CURRENT_FUNCTION: NO ASSISTANCE NEEDED

## 2023-06-09 NOTE — PATIENT INSTRUCTIONS
Please see your dentist for this spot on the roof of the mouth.  You should have a biopsy of this spot on the roof of the mouth.      Please follow up in 6 months.                June 9, 2023  [facility]    [address] [place] [address], [address] [place] [address]  P: [phone]  F: [phone] (prefer referral to enter in online)     Referral submit online @ [email or web address]. They will contact pt for an appointment.     Katia Maria

## 2023-06-09 NOTE — PROGRESS NOTES
"SUBJECTIVE:   Razia is a 67 year old who presents for Preventive Visit.      6/9/2023    10:30 AM   Additional Questions   Roomed by Mao   Accompanied by self     Are you in the first 12 months of your Medicare coverage?  No    Healthy Habits:    In general, how would you rate your overall health?  Fair    Frequency of exercise:  2-3 days/week    Duration of exercise:  15-30 minutes    Do you usually eat at least 4 servings of fruit and vegetables a day, include whole grains    & fiber and avoid regularly eating high fat or \"junk\" foods?  Yes    Taking medications regularly:  Yes    Medication side effects:  None    Ability to successfully perform activities of daily living:  No assistance needed    Home Safety:  Throw rugs in the hallway and lack of grab bars in the bathroom    Hearing Impairment:  No hearing concerns    In the past 6 months, have you been bothered by leaking of urine?  No    In general, how would you rate your overall mental or emotional health?  Fair      PHQ-2 Total Score:    Additional concerns today:  No      Past Medical History:   Diagnosis Date     HPV (low risk) 3/14/2013    3/2013:  Normal pap, positive for low risk HPV.   H/O ASCUS in 2005, +HPV of undetermined risk in 2012 No history of high grade lesion     Hypertension      Family History   Problem Relation Age of Onset     No Known Problems Mother      No Known Problems Father      No Known Problems Maternal Grandmother      No Known Problems Maternal Grandfather      No Known Problems Paternal Grandmother      No Known Problems Paternal Grandfather      Heart Disease Brother      No Known Problems Sister      No Known Problems Son      No Known Problems Daughter      No Known Problems Maternal Half-Brother      No Known Problems Maternal Half-Sister      No Known Problems Paternal Half-Brother      No Known Problems Paternal Half-Sister      No Known Problems Niece      No Known Problems Nephew      No Known Problems Cousin      No " Known Problems Other      Diabetes No family hx of      Coronary Artery Disease No family hx of      Hypertension No family hx of      Hyperlipidemia No family hx of      Breast Cancer No family hx of      Cancer - colorectal No family hx of      Ovarian Cancer No family hx of      Prostate Cancer No family hx of      Other Cancer No family hx of      Mental Illness No family hx of      Cerebrovascular Disease No family hx of      Anesthesia Reaction No family hx of      Asthma No family hx of      Osteoporosis No family hx of      Known Genetic Syndrome No family hx of      Obesity No family hx of      Unknown/Adopted No family hx of      Cancer No family hx of      Kidney Disease No family hx of      Thrombosis No family hx of      Arthritis No family hx of      Thyroid Disease No family hx of      Depression No family hx of      Mental Illness No family hx of      Substance Abuse No family hx of      Cystic Fibrosis No family hx of      Early Death No family hx of      Coronary Artery Disease Early Onset No family hx of      Heart Failure No family hx of      Bleeding Diathesis No family hx of      Dementia No family hx of      Uterine Cancer No family hx of      Colorectal Cancer No family hx of      Pancreatic Cancer No family hx of      Lung Cancer No family hx of      Melanoma No family hx of      Autoimmune Disease No family hx of      Genetic Disorder No family hx of      Have you ever done Advance Care Planning? (For example, a Health Directive, POLST, or a discussion with a medical provider or your loved ones about your wishes): No, advance care planning information given to patient to review.  Advanced care planning was discussed at today's visit.  We gave her the [employer] Cho[employer] handout and she will discuss this further with her family.      Hearing Assessment: normal   Fall risk  Fallen 2 or more times in the past year?: No  Any fall with injury in the past year?: No  click delete button to remove  this line now  Cognitive Screening: Could not be completed because of language barrier.      Reviewed and updated as needed this visit by clinical staff   Tobacco  Allergies  Meds              Reviewed and updated as needed this visit by Provider                 Social History     Tobacco Use     Smoking status: Never     Smokeless tobacco: Never   Vaping Use     Vaping status: Never Used   Substance Use Topics     Alcohol use: No             6/9/2023    10:20 AM   Alcohol Use   Prescreen: >3 drinks/day or >7 drinks/week? Not Applicable     Do you have a current opioid prescription? No  Do you use any other controlled substances or medications that are not prescribed by a provider? None            Current providers sharing in care for this patient include:   Patient Care Team:  Walter He MD as PCP - General (Family Medicine)  Walter He MD as Assigned PCP    The following health maintenance items are reviewed in Epic and correct as of today:  Health Maintenance   Topic Date Due     ADVANCE CARE PLANNING  Never done     DEXA  08/17/2022     PHQ-2 (once per calendar year)  01/01/2023     COVID-19 Vaccine (6 - Pfizer series) 04/06/2023     MEDICARE ANNUAL WELLNESS VISIT  05/10/2023     MAMMO SCREENING  04/19/2024     FALL RISK ASSESSMENT  06/09/2024     COLORECTAL CANCER SCREENING  05/11/2025     LIPID  02/22/2026     DTAP/TDAP/TD IMMUNIZATION (3 - Td or Tdap) 05/10/2032     HEPATITIS C SCREENING  Completed     INFLUENZA VACCINE  Completed     Pneumococcal Vaccine: 65+ Years  Completed     ZOSTER IMMUNIZATION  Completed     IPV IMMUNIZATION  Aged Out     MENINGITIS IMMUNIZATION  Aged Out     FHS-7:       4/19/2022     8:52 AM   Breast CA Risk Assessment (FHS-7)   Did any of your first-degree relatives have breast or ovarian cancer? No   Did any of your relatives have bilateral breast cancer? No   Did any man in your family have breast cancer? No   Did any woman in your family have breast and ovarian  "cancer? No   Did any woman in your family have breast cancer before age 50 y? No   Do you have 2 or more relatives with breast and/or ovarian cancer? No   Do you have 2 or more relatives with breast and/or bowel cancer? No      Last colonoscopy in 2015 - normal, repeat in 2025 if no issues        click delete button to remove this line now  Mammogram Screening: Recommended mammography every 1-2 years with patient discussion and risk factor consideration  Pertinent mammograms are reviewed under the imaging tab.    Review of Systems   Constitutional: Negative for chills and fever.   HENT: Negative for congestion, ear pain, hearing loss and sore throat.    Eyes: Negative for pain and visual disturbance.   Respiratory: Negative for cough and shortness of breath.    Cardiovascular: Negative for chest pain, palpitations and peripheral edema.   Gastrointestinal: Negative for abdominal pain, constipation, diarrhea, heartburn, hematochezia and nausea.   Breasts:  Negative for tenderness, breast mass and discharge.   Genitourinary: Negative for dysuria, frequency, genital sores, hematuria, pelvic pain, urgency, vaginal bleeding and vaginal discharge.   Musculoskeletal: Positive for arthralgias and joint swelling. Negative for myalgias.   Skin: Negative for rash.   Neurological: Negative for dizziness, weakness, headaches and paresthesias.   Psychiatric/Behavioral: Negative for mood changes. The patient is not nervous/anxious.      Checks BP at home and is 120-130s.    OBJECTIVE:   There were no vitals taken for this visit. Estimated body mass index is 26.45 kg/m  as calculated from the following:    Height as of 5/10/22: 1.549 m (5' 1\").    Weight as of 12/6/22: 63.5 kg (140 lb).  Physical Exam  GENERAL: healthy, alert and no distress  NECK: no adenopathy, no asymmetry, masses, or scars and thyroid normal to palpation  RESP: lungs clear to auscultation - no rales, rhonchi or wheezes  CV: regular rate and rhythm, normal S1 S2, " no S3 or S4, no murmur, click or rub, no peripheral edema and peripheral pulses strong  ABDOMEN: soft, nontender, no hepatosplenomegaly, no masses and bowel sounds normal  MS: no gross musculoskeletal defects noted, no edema        none     ASSESSMENT / PLAN:       ICD-10-CM    1. Menopause  Z78.0 DX Hip/Pelvis/Spine      2. Pre-diabetes  R73.03 Calcium 600-5 MG-MCG TABS     multivitamin (DEKAS ESSENTIAL) capsule     Hemoglobin A1c     Comprehensive metabolic panel     Comprehensive metabolic panel     Hemoglobin A1c      3. Leukoplakia of oral mucosa, including tongue  K13.21 Dental Referral      4. Encounter for screening mammogram for breast cancer  Z12.31 PCS Use: Screening Digital Bilateral Mammogram      5. Pure hypercholesterolemia  E78.00 Lipid Profile     Lipid Profile      6. Benign essential hypertension  I10       7. Osteopenia of lumbar spine  M85.88 Vitamin D Deficiency     DX Hip/Pelvis/Spine     Vitamin D Deficiency      8. Living will, counseling/discussion  Z71.89           Patient has been advised of split billing requirements and indicates understanding: Yes      COUNSELING:  Reviewed preventive health counseling, as reflected in patient instructions       Regular exercise       Dental care       Osteoporosis prevention/bone health       Advanced Planning         She reports that she has never smoked. She has never used smokeless tobacco.      Appropriate preventive services were discussed with this patient, including applicable screening as appropriate for cardiovascular disease, diabetes, osteopenia/osteoporosis, and glaucoma.  As appropriate for age/gender, discussed screening for colorectal cancer, prostate cancer, breast cancer, and cervical cancer. Checklist reviewing preventive services available has been given to the patient.    Reviewed patients plan of care and provided an AVS.  for Razia meets the Care Plan requirement. This Care Plan has been established and reviewed with the  Patient.      Walter He MD  [facility]

## 2023-06-09 NOTE — LETTER
June 13, 2023      Razia K Raya  [address]  [place] [address]        Dear ,    We are writing to inform you of your test results.    These results are within the normal range for you.  Please follow up in the clinic as directed.     Resulted Orders   Vitamin D Deficiency   Result Value Ref Range    Vitamin D, Total (25-Hydroxy) 44 20 - 75 ug/L    Narrative    Season, race, dietary intake, and treatment affect the concentration of 25-hydroxy-Vitamin D. Values may decrease during winter months and increase during summer months. Values 20-29 ug/L may indicate Vitamin D insufficiency and values <20 ug/L may indicate Vitamin D deficiency.    Vitamin D determination is routinely performed by an immunoassay specific for 25 hydroxyvitamin D3.  If an individual is on vitamin D2(ergocalciferol) supplementation, please specify 25 OH vitamin D2 and D3 level determination by LCMSMS test VITD23.     Comprehensive metabolic panel   Result Value Ref Range    Sodium 143 136 - 145 mmol/L    Potassium 4.2 3.4 - 5.3 mmol/L    Chloride 104 98 - 107 mmol/L    Carbon Dioxide (CO2) 28 22 - 29 mmol/L    Anion Gap 11 7 - 15 mmol/L    Urea Nitrogen 10.8 8.0 - 23.0 mg/dL    Creatinine 0.88 0.51 - 0.95 mg/dL    Calcium 10.1 8.8 - 10.2 mg/dL    Glucose 104 (H) 70 - 99 mg/dL    Alkaline Phosphatase 59 35 - 104 U/L    AST 34 10 - 35 U/L    ALT 25 10 - 35 U/L    Protein Total 7.9 6.4 - 8.3 g/dL    Albumin 4.8 3.5 - 5.2 g/dL    Bilirubin Total 0.6 <=1.2 mg/dL    GFR Estimate 71 >60 mL/min/1.73m2      Comment:      eGFR calculated using 2021 CKD-EPI equation.   Hemoglobin A1c   Result Value Ref Range    Hemoglobin A1C 5.9 (H) 0.0 - 5.6 %      Comment:      Normal <5.7%   Prediabetes 5.7-6.4%    Diabetes 6.5% or higher     Note: Adopted from ADA consensus guidelines.   Lipid Profile   Result Value Ref Range    Cholesterol 163 <200 mg/dL    Triglycerides 186 (H) <150 mg/dL    Direct Measure HDL 46 (L) >=50 mg/dL    LDL Cholesterol  Calculated 80 <=100 mg/dL    Non HDL Cholesterol 117 <130 mg/dL    Narrative    Cholesterol  Desirable:  <200 mg/dL    Triglycerides  Normal:  Less than 150 mg/dL  Borderline High:  150-199 mg/dL  High:  200-499 mg/dL  Very High:  Greater than or equal to 500 mg/dL    Direct Measure HDL  Female:  Greater than or equal to 50 mg/dL   Male:  Greater than or equal to 40 mg/dL    LDL Cholesterol  Desirable:  <100mg/dL  Above Desirable:  100-129 mg/dL   Borderline High:  130-159 mg/dL   High:  160-189 mg/dL   Very High:  >= 190 mg/dL    Non HDL Cholesterol  Desirable:  130 mg/dL  Above Desirable:  130-159 mg/dL  Borderline High:  160-189 mg/dL  High:  190-219 mg/dL  Very High:  Greater than or equal to 220 mg/dL       If you have any questions or concerns, please call the clinic at the number listed above.       Sincerely,      Walter He MD

## 2023-06-09 NOTE — NURSING NOTE
Prior to immunization administration, verified patients identity using patient s name and date of birth. Please see Immunization Activity for additional information.     Screening Questionnaire for Adult Immunization    Are you sick today?   No   Do you have allergies to medications, food, a vaccine component or latex?   No   Have you ever had a serious reaction after receiving a vaccination?   No   Do you have a long-term health problem with heart, lung, kidney, or metabolic disease (e.g., diabetes), asthma, a blood disorder, no spleen, complement component deficiency, a cochlear implant, or a spinal fluid leak?  Are you on long-term aspirin therapy?   No   Do you have cancer, leukemia, HIV/AIDS, or any other immune system problem?   No   Do you have a parent, brother, or sister with an immune system problem?   No   In the past 3 months, have you taken medications that affect  your immune system, such as prednisone, other steroids, or anticancer drugs; drugs for the treatment of rheumatoid arthritis, Crohn s disease, or psoriasis; or have you had radiation treatments?   No   Have you had a seizure, or a brain or other nervous system problem?   No   During the past year, have you received a transfusion of blood or blood    products, or been given immune (gamma) globulin or antiviral drug?   No   For women: Are you pregnant or is there a chance you could become       pregnant during the next month?   No   Have you received any vaccinations in the past 4 weeks?   No     Immunization questionnaire answers were all negative.      Injection of Pfizer Bivalent given by Timothy Stovall CMA. Patient instructed to remain in clinic for 15 minutes afterwards, and to report any adverse reactions.     Screening performed by Timothy Stovall CMA on 6/9/2023 at 11:36 AM.

## 2023-06-10 LAB — DEPRECATED CALCIDIOL+CALCIFEROL SERPL-MC: 44 UG/L (ref 20–75)

## 2023-07-10 ENCOUNTER — HOSPITAL ENCOUNTER (OUTPATIENT)
Dept: BONE DENSITY | Facility: HOSPITAL | Age: 68
Discharge: HOME | End: 2023-07-10
Attending: FAMILY MEDICINE
Payer: COMMERCIAL

## 2023-07-10 ENCOUNTER — ANCILLARY PROCEDURE (OUTPATIENT)
Dept: MAMMOGRAPHY | Facility: HOSPITAL | Age: 68
End: 2023-07-10
Attending: FAMILY MEDICINE
Payer: COMMERCIAL

## 2023-07-10 DIAGNOSIS — M85.88 OSTEOPENIA OF LUMBAR SPINE: ICD-10-CM

## 2023-07-10 DIAGNOSIS — Z12.31 VISIT FOR SCREENING MAMMOGRAM: ICD-10-CM

## 2023-07-10 PROCEDURE — 77067 SCR MAMMO BI INCL CAD: CPT

## 2023-07-10 PROCEDURE — 77080 DXA BONE DENSITY AXIAL: CPT

## 2023-07-10 NOTE — LETTER
July 12, 2023      Razia Dos Santos  [address]  [place] [address]        Dear ,    We are writing to inform you of your test results.    You have low bone density but do not have osteoporosis.  Please take Caltrate D twice a day as this gives you both calcium and vitamin D which will help strengthen your bones.               Resulted Orders   DX Hip/Pelvis/Spine    Narrative    EXAM: DX HIP/PELVIS/SPINE  LOCATION: [facility]  DATE: 7/10/2023    INDICATION: BMD screening, follow-up  DEMOGRAPHICS: Age- 67 years. Gender- Female. Menopausal status- Postmenopausal.  COMPARISON: 08/17/2020  TECHNIQUE: Dual-energy x-ray absorptiometry (DXA) performed with routine technique.  Trabecular bone score (TBS) analysis performed.    FINDINGS:    DXA RESULTS  -Lumbar Spine: L1-L4: BMD: 1.208 g/cm2. T-score: 0.2. Z-score: 1.9. Degenerative change may artifactually increase BMD.  -RIGHT Hip Total: BMD: 0.803 g/cm2. T-score: -1.6. Z-score: -0.2.  -RIGHT Hip Femoral neck: BMD: 0.716 g/cm2. T-score: -2.3. Z-score: -0.7.  -LEFT Hip Total: BMD: 0.812 g/cm2. T-score: -1.6. Z-score: -0.2.  -LEFT Hip Femoral neck: BMD: 0.752 g/cm2. T-score: -2.1. Z-score: -0.4.    WHO T-SCORE CRITERIA  -Normal: T score at or above -1 SD  -Osteopenia: T score between -1 and -2.5 SD  -Osteoporosis: T score at or below -2.5 SD    The World Health Organization (WHO) criteria is applicable to perimenopausal females, postmenopausal females, and men aged 50 years or older.    TBS RESULTS  -Lumbar Spine L1-L4: TBS: 1.184. TBS T-score: -3.0.TBS Z-score: -0.8.    The TBS is a DXA derived measurement for fracture risk assessment, and reflects the structural condition of the bone microarchitecture. It can be used to adjust WHO Fracture Risk Assessment Tool (FRAX) probability of fracture in postmenopausal women and   older men. The calculated probabilities of fracture have been shown to be more accurate when computed with the  TBS.    INTERVAL CHANGE  -There has been a 0.2% decrease in lumbar spine BMD.  -There has been a 1.4% decrease in right hip BMD.    FRACTURE RISK  -FRAX Results: The 10 year probability of major osteoporotic fracture is 13.5%, and of hip fracture is 3.0%, based on right femoral neck BMD.  -TBS-adjusted FRAX Results: The 10 year probability of major osteoporotic fracture is 15.5%, and of hip fracture is 3.5%.    RECOMMENDATIONS  Consider treatment if major osteoporotic fracture score is greater than or equal to 20%, and if the hip fracture score is greater than or equal to 3%.      Impression    IMPRESSION: Low bone density (OSTEOPENIA). T score meets the WHO criteria for low bone density (osteopenia) at one or more measured sites. The risk of osteoporotic fracture increases approximately two-fold for each standard deviation decrease in T-score.       If you have any questions or concerns, please call the clinic at the number listed above.       Sincerely,      Walter He MD

## 2023-07-10 NOTE — RESULT ENCOUNTER NOTE
The mammogram was normal.  This can be repeated next year.  Please follow up in the clinic as directed.

## 2023-07-10 NOTE — RESULT ENCOUNTER NOTE
You have low bone density but do not have osteoporosis.  Please take Caltrate D twice a day as this gives you both calcium and vitamin D which will help strengthen your bones.

## 2023-07-10 NOTE — LETTER
July 12, 2023      Razia Dos Santos  [address]  [place] [address]        Dear ,    We are writing to inform you of your test results.    The mammogram was normal.  This can be repeated next year.  Please follow up in the clinic as directed.               Resulted Orders   MA Screening Digital Bilateral    Narrative    BILATERAL FULL FIELD DIGITAL SCREENING MAMMOGRAM    Performed on: 7/10/23    Compared to: 04/19/2022, 08/21/2020, and 02/25/2019    Technique: This study was evaluated with the assistance of Computer-Aided   Detection.    Findings: The breasts have scattered areas of fibroglandular density.    There is no radiographic evidence of malignancy.     Impression    IMPRESSION: ACR BI-RADS Category 1: Negative    RECOMMENDED FOLLOW-UP: Annual routine screening mammogram    The results and recommendations of this examination will be communicated   to the patient.        Marlene Fox MD             If you have any questions or concerns, please call the clinic at the number listed above.       Sincerely,      Walter He MD

## 2023-09-06 DIAGNOSIS — R73.03 PREDIABETES: ICD-10-CM

## 2023-09-07 RX ORDER — FOLIC ACID/MV,IRON,MIN/LUTEIN 0.4-18-25
1 TABLET ORAL DAILY
Qty: 90 TABLET | Refills: 3 | Status: SHIPPED | OUTPATIENT
Start: 2023-09-07

## 2023-10-24 DIAGNOSIS — H40.9 GLAUCOMA, UNSPECIFIED GLAUCOMA TYPE, UNSPECIFIED LATERALITY: Primary | ICD-10-CM

## 2023-10-24 RX ORDER — LATANOPROST 50 UG/ML
SOLUTION/ DROPS OPHTHALMIC
Qty: 10 ML | Refills: 4 | Status: SHIPPED | OUTPATIENT
Start: 2023-10-24

## 2023-12-08 ENCOUNTER — TELEPHONE (OUTPATIENT)
Dept: FAMILY MEDICINE | Facility: CLINIC | Age: 68
End: 2023-12-08
Payer: COMMERCIAL

## 2023-12-08 NOTE — TELEPHONE ENCOUNTER
Attempted to contact pt to assist in scheduling DEXA scan, left vm with return number. Will reach out again in the future.    ROJELIO Benton

## 2023-12-12 ENCOUNTER — ANCILLARY PROCEDURE (OUTPATIENT)
Dept: GENERAL RADIOLOGY | Facility: CLINIC | Age: 68
End: 2023-12-12
Attending: FAMILY MEDICINE
Payer: COMMERCIAL

## 2023-12-12 ENCOUNTER — OFFICE VISIT (OUTPATIENT)
Dept: FAMILY MEDICINE | Facility: CLINIC | Age: 68
End: 2023-12-12
Payer: COMMERCIAL

## 2023-12-12 VITALS
BODY MASS INDEX: 26.43 KG/M2 | SYSTOLIC BLOOD PRESSURE: 142 MMHG | DIASTOLIC BLOOD PRESSURE: 81 MMHG | OXYGEN SATURATION: 100 % | TEMPERATURE: 98.3 F | WEIGHT: 140 LBS | HEART RATE: 88 BPM | HEIGHT: 61 IN | RESPIRATION RATE: 16 BRPM

## 2023-12-12 DIAGNOSIS — M85.88 OSTEOPENIA OF LUMBAR SPINE: ICD-10-CM

## 2023-12-12 DIAGNOSIS — N06.9 ISOLATED PROTEINURIA WITH MORPHOLOGIC LESION: ICD-10-CM

## 2023-12-12 DIAGNOSIS — M54.50 CHRONIC LEFT-SIDED LOW BACK PAIN WITHOUT SCIATICA: ICD-10-CM

## 2023-12-12 DIAGNOSIS — R73.03 PRE-DIABETES: Primary | ICD-10-CM

## 2023-12-12 DIAGNOSIS — I10 BENIGN ESSENTIAL HYPERTENSION: Chronic | ICD-10-CM

## 2023-12-12 DIAGNOSIS — L30.9 DERMATITIS: ICD-10-CM

## 2023-12-12 DIAGNOSIS — G89.29 CHRONIC LEFT-SIDED LOW BACK PAIN WITHOUT SCIATICA: ICD-10-CM

## 2023-12-12 LAB
ALBUMIN UR-MCNC: NEGATIVE MG/DL
APPEARANCE UR: CLEAR
BACTERIA #/AREA URNS HPF: NORMAL /HPF
BILIRUB UR QL STRIP: NEGATIVE
COLOR UR AUTO: YELLOW
GLUCOSE UR STRIP-MCNC: NEGATIVE MG/DL
HGB UR QL STRIP: ABNORMAL
KETONES UR STRIP-MCNC: NEGATIVE MG/DL
LEUKOCYTE ESTERASE UR QL STRIP: NEGATIVE
NITRATE UR QL: NEGATIVE
PH UR STRIP: 5.5 [PH] (ref 5–8)
RBC #/AREA URNS AUTO: NORMAL /HPF
SP GR UR STRIP: 1.01 (ref 1–1.03)
SQUAMOUS #/AREA URNS AUTO: NORMAL /LPF
UROBILINOGEN UR STRIP-ACNC: 0.2 E.U./DL
WBC #/AREA URNS AUTO: NORMAL /HPF

## 2023-12-12 PROCEDURE — 81001 URINALYSIS AUTO W/SCOPE: CPT | Performed by: FAMILY MEDICINE

## 2023-12-12 PROCEDURE — 72100 X-RAY EXAM L-S SPINE 2/3 VWS: CPT | Mod: TC | Performed by: RADIOLOGY

## 2023-12-12 PROCEDURE — 99214 OFFICE O/P EST MOD 30 MIN: CPT | Performed by: FAMILY MEDICINE

## 2023-12-12 RX ORDER — TRIAMCINOLONE ACETONIDE 5 MG/G
OINTMENT TOPICAL
Qty: 30 G | Refills: 4 | Status: SHIPPED | OUTPATIENT
Start: 2023-12-12

## 2023-12-12 RX ORDER — LISINOPRIL 20 MG/1
20 TABLET ORAL DAILY
Qty: 90 TABLET | Refills: 3 | Status: SHIPPED | OUTPATIENT
Start: 2023-12-12 | End: 2024-09-04

## 2023-12-12 NOTE — PROGRESS NOTES
"Tissue growth at mesial #14  Hyerpkeratiosis        Assessment & Plan     Pre-diabetes  No evidence of diabetes.  Continue healthy diet and exercise levels.    Isolated proteinuria with morphologic lesion  She is reporting \"bubbles in the urine.\"  Will check a UA for protein.  - Urine Macroscopic with reflex to Microscopic; Future  - Urine Macroscopic with reflex to Microscopic  - Urine Microscopic Exam    Benign essential hypertension  Blood pressure is suboptimally controlled.  We will increase from 15 mg to 20 mg a day of Zestril.  - lisinopril (ZESTRIL) 20 MG tablet; Take 1 tablet (20 mg) by mouth daily    Osteopenia of lumbar spine  She has known osteopenia and reports 1 to 2 inches of lost height.  As she does not have osteoporosis based on her DEXA scan and her FRAX score gives her a risk of major fracture of 11% she does not currently meet criteria for treatment with medication.  Will continue with vitamin D and calcium and encourage weightbearing exercise, etc.  Her DEXA scan should be repeated in 1 and half years.  - XR Lumbar Spine 2-3 Views*; Future    Chronic left-sided low back pain without sciatica  I think this back pain actually represents occasional twinges of nerve pain and I wonder if this could be related to previous compression fracture.  We will get plain films to look for evidence of compression fracture.    Dermatitis    - triamcinolone (KENALOG) 0.5 % external ointment; Apply twice a day as needed up to 8 times per month.    Diagnosis or treatment significantly limited by social determinants of health - language barrier  30 minutes spent by me on the date of the encounter doing chart review, review of outside records, patient visit, and documentation        BMI:   Estimated body mass index is 26.45 kg/m  as calculated from the following:    Height as of this encounter: 1.549 m (5' 1\").    Weight as of this encounter: 63.5 kg (140 lb).       Walter He MD  [facility] " "ABRAHAN Randle is a 68 year old, presenting for the following health issues:  Back Pain (L side back pain), urine problem (Per pt notice her urine having a lot of bubbles- no pain or burning), Results (From the orthodontist), and Medication Question (Wants to know if she needs to continue taking the cerovite )      12/12/2023     3:19 PM   Additional Questions   Roomed by Mao   Accompanied by self       HPI   Patient is here to follow-up on a few different things.    She gets some pain in the low back on the left side.  She attributes this to her kidney.  It is a sharp pain that lasts 15 to 30 seconds.  It does not seem to be worse or better with activity.  This been going on now for several months or maybe even a year or more.    She reports that she has \"bubbles in the urine.\"  This does not happen all the time.  She is wondering if this represents a serious illness.    Of note she does have osteopenia based on a DEXA scan last summer.  She does not have osteoporosis and her FRAX score gives her a 2% chance of hip fracture and 11% chance of major osteoporotic fracture which is below the treatment threshold.    Review of Systems   Constitutional, HEENT, cardiovascular, pulmonary, gi and gu systems are negative, except as otherwise noted.      Objective    BP (!) 142/81   Pulse 88   Temp 98.3  F (36.8  C) (Oral)   Resp 16   Ht 1.549 m (5' 1\")   Wt 63.5 kg (140 lb)   SpO2 100%   BMI 26.45 kg/m    Body mass index is 26.45 kg/m .  Physical Exam   GENERAL: healthy, alert and no distress  NECK: no adenopathy, no asymmetry, masses, or scars and thyroid normal to palpation  RESP: lungs clear to auscultation - no rales, rhonchi or wheezes  CV: regular rate and rhythm, normal S1 S2, no S3 or S4, no murmur  ABDOMEN: soft, nontender, no hepatosplenomegaly, no palpable kidney abnormality  MS: no gross musculoskeletal defects noted, no edema  BACK: no CVA tenderness, no paralumbar tenderness; no observable " rash

## 2023-12-12 NOTE — COMMUNITY RESOURCES LIST (PATIENT PREFERRED LANGUAGE)
12/12/2023   [facility] - Outpatient Clinics  N/A  N?u có th?c m?c v? francia nicolasa tamayo nguyên này ho?c các maritza c?u ch?m sóc b? donita vui lòng liên h? v?i phòng khám ch?m sóc chính ho?c ng??i qu?n lý ch?m sóc c?a b?n.  Phone: [phone]   Email: N/A   Address: [address] [place] [address]   Hours: N/A        ???ng dây nóng và ???ng dây tr? giúp       ???ng dây nóng - Kh?ng ho?ng nhà ?  1  Nhà ? c?a ??ng C?u R?i Emani?ng cách: 6.96 d?m      ?i?n tho?i/?o   [address] [place] [address]  Ngôn ng?: [place]ng [place]  Gi?: Th? jv - ch? nh?t M? c?a 24 gi?   Phone: [phone] Email: communications@oscs-[place].org Website: https://[place]s-[place].org/oursaviourshousing/     2  N?n t?ng - [place] Emani?ng cách: 8.54 d?m      ?i?n tho?i/?o   [address] [place] [address]  Ngôn ng?: Ti?ng [place]  Gi?: Th? jv - ch? nh?t M? c?a 24 gi?   Phone: [phone] Email: info@cornerstonemn.org Website: http://www.cornerstonemn.org          Nhà ?       ?i?m truy c?p m?c nh?p ph?i h?p  3  Ch?m sóc c?ng ??ng [place] - Phòng khám [facility] Emani?ng cách: 0.94 d?m      M?t ??i m?t, ?i?n tho?i/?o   [address] [place] [address]  Ngôn ng?: Ti?ng [place]  Gi?: Th? jv - Th? sáu 8:30 SA - 4:30 CH  Phí: Mi?n phí   Phone: [phone] Email: info@[facility].org Website: https://www.[facility].org/locations/[facility]/     4  D?ch v? Nhân sinh và Y t? Qu?n Rivera - Ti?p c?n Ph?i h?p v?i Nhà ? và N?i t?m trú (CAHS) - Truy c?p Ph?i h?p - ?i?m truy c?p Ph?i h?p Emani?ng cách: 1.66 d?m      M?t ??i m?t, ?i?n tho?i/?o   [address] [place] [address]  Ngôn ng?: Ti?ng [place]  Gi?: Th? jv - Th? sáu 8:00 SA - 4:30 CH  Phí: Mi?n phí   Phone: [phone] Website: https://www.[facility]./residents/assistance-support/assistance/housing-services-support     Cody tâm t?m trú ho?c n?i trú ?n ban ngày  5  Nhà l?ng nghe Thánh Phaolô Emani?ng cách: 2.38 d?m      M?t ??i m?t   [address] [place] [address]  Ngôn  ng?: Ti?ng [place]  Gi?: Th? jv - Th? sáu 9:00 SA - 4:00 CH  Phí: Mi?n phí   Phone: [phone] Email: deven@[employer].org Website: http://[employer]     6  T? ch?c t? thi?n Công giáo [facility] tâm C? h?i Emani?ng cách: 7.06 d?m      M?t ??i m?t   [address] [place] [address]  Ngôn ng?: ng??i Tây Ban Nha, Ti?ng [place], ti?ng Syrian  Gi?: Th? jv - ngày th? b?y 7:00 SA - 3:00 CH  Phí: Mi?n phí, T? tr?   Phone: [phone] Email: info@[employer] Website: https://www.[employer]/locations/opportunity-center/     H? tr? tìm ki?m nhà ?  7  Nhà ? giá c? ph?i ch?ng tr?c bird?n - https://Telebit/ Emani?ng cách: 7.34 d?m      ?i?n tho?i/?o   [address] [place] [address]  Ngôn ng?: Ti?ng [place]  Gi?: Th? jv - ch? nh?t M? c?a 24 gi?   Email: info@[employer] Website: https://Telebit     8  Liên k?t nhà ? - H? tr? tìm ki?m nhà ? tr?c bird?n Emani?ng cách: 8.5 d?m      ?i?n tho?i/?o   [address] [address]  Ngôn ng?: ng??i Mông, ng??i Tây Ban Nha, Ti?ng [place], ti?ng Syrian  Gi?: Th? jv - ch? nh?t M? c?a 24 gi?   Phone: [phone] Email: info@[employer] Website: http://www.[employer].org/     N?i trú ?n cho carol ?ình  9  N?i t?m trú c?a carol ?ình [facility] [place] Emani?ng cách: 15.75 d?m      M?t ??i m?t   [address] [place] [address]  Ngôn ng?: Ti?ng [place]  Gi?: Th? jv - Th? sáu 3:00 CH - 9:00 SA , ngày th? b?y - ch? nh?t M? c?a 24 gi?  Phí: Mi?n phí   Phone: [phone] Ex.1 Website: https://www.saintandrews.[employer]/2020/07/03/emergency-family-shelter/     N?i trú ?n cho cá nhân  10  T? ch?c t? thi?n Công giáo c?a [facility] - N?i trú ?n [place] ? vùng ??t florentino h?n - N?i trú ?n [place] ? vùng ??t florentino h?n Emani?ng cách: 0.89 d?m      M?t ??i m?t   [address] [place] [address]  Ngôn ng?: Ti?ng [place]  Gi?: Th? jv - ch? nh?t 5:00 CH - 10:00 SA  Phí: Mi?n phí, T?  tr?   Phone: [phone] Email: info@[employer] Website: https://www.[facility] CampusiVideosongs.org/locations/[facility]-[place]/     11  D?ch v? Nhân sinh và Y t? Qu?n Rivera - Ti?p c?n Ph?i h?p v?i Nhà ? và N?i t?m trú (CAHS) - Ti?p c?n Ph?i h?p - Nhà ? kh?n c?p Emani?ng cách: 1.66 d?m      M?t ??i m?t, ?i?n tho?i/?o   [address] [place] [address]  Ngôn ng?: Ti?ng [place]  Gi?: Th? jv - Th? sáu 8:00 SA - 4:30 CH  Phí: Mi?n phí   Phone: [phone] Website: https://www.[facility]./residents/assistance-support/assistance/housing-services-support          Nh?ng con s? và shimon web rachna tr?ng       cách th?ng nh?t   211 211itedInvestormill.org  Ki?m soát ch?t ??c   [phone] [facility].org  ???ng dây c?u tr? t? t? và kh?ng ho?ng   983 98lifeline.org  ???ng dây nóng qu?c carol v? l?m d?ng tr? em Childhelp   [phone] [facility] Hospitalphotline.org  ???ng dây nóng qu?c carol v? t?n công tình d?c   [phone] (HOPE) Rainn.org  ???ng dây an toàn b? tr?n qu?c carol   [phone] (RUNAWAY) [facility] Cent[facility]away.org  H? tr? Devon karen & Sandra sinh [place]   Call/text [phone] Ppsupportmn.org  ???ng dây Tr? giúp Qu?c carol v? L?m d?ng Ch?t gây michelle?n ([facility]   [phone]-HELP (4357) Findtreatment.gov  Các d?ch v? kh?n c?p   457

## 2023-12-12 NOTE — COMMUNITY RESOURCES LIST (ENGLISH)
12/12/2023   [facility]  N/A  For questions about this resource list or additional care needs, please contact your primary care clinic or care manager.  Phone: [phone]   Email: N/A   Address: [address] [place] [address]   Hours: N/A        Hotlines and Helplines       Hotline - Housing crisis  1  [facility] Distance: 6.96 miles      Phone/Virtual   [address] [place] [address]  Language: English  Hours: Mon - Sun Open 24 Hours   Phone: [phone] Email: communications@[facility]-mn.org Website: https://[place]s-[place].org/oursaviourshousing/     2  [facility] Distance: 8.54 miles      Phone/Virtual   [address] [place] [address]  Language: English  Hours: Mon - Sun Open 24 Hours   Phone: [phone] Email: info@[facility].RollCall (roll.to) Website: http://www.[facility].org          Housing       Coordinated Entry access point  3  [facility] Distance: 0.94 miles      In-Person, Phone/Virtual   [address] [place] [address]  Language: English  Hours: Mon - Fri 8:30 AM - 4:30 PM  Fees: Free   Phone: [phone] Email: info@[facility].org Website: https://www.[facility].org/locations/[facility]/     4  [facility] - Coordinated Access to Housing and Shelter (CAHS) - Coordinated Access - Coordinated Entry access point Distance: 1.66 miles      In-Person, Phone/Virtual   [address] [place] [address]  Language: English  Hours: Mon - Fri 8:00 AM - 4:30 PM  Fees: Free   Phone: [phone] [phone] Website: https://www.[facility]./residents/assistance-support/assistance/housing-services-support     Drop-in center or day shelter  5  [facility] Distance: 2.38 miles      In-Person   [address] [place] [address]  Language: English  Hours: Mon - Fri 9:00 AM - 4:00 PM  Fees: Free   Phone: [phone] Email: deven@[facility].org Website:  http://[facility] HospitalTablus.org     6  [facility] Distance: 7.06 miles      In-Person   [address] [place] [address]  Language: English, Turkmen, Turkish  Hours: Mon - Sat 7:00 AM - 3:00 PM  Fees: Free, Self Pay   Phone: [phone] Email: info@[employer] Website: https://www.[employer]/locations/opportunity-center/     Housing search assistance  7  [facility] - Housing Search Assistance Distance: 2.47 miles      Phone/Virtual   [address] [place] [address]  Language: Kiswahili, English, Hmong, Marlee, Turkmen, Turkish  Hours: Mon - Fri Appt. Only  Fees: Free   Phone: [phone]) [phone] Website: https://House Party.[employer]/     8  [facility] - Online Housing Search Assistance Distance: 2.78 miles      Phone/Virtual   [address] [place] [address]  Language: English  Hours: Mon - Sun Open 24 Hours  Fees: Free   Phone: [phone]) [phone] Email: findvibha@[employer] Website: https://[employer]/     Shelter for families  9  [facility] Distance: 15.75 miles      In-Person   [address] [place] [address]  Language: English  Hours: Mon - Fri 3:00 PM - 9:00 AM , Sat - Sun Open 24 Hours  Fees: Free   Phone: [phone]) 942-1288 Ext.1 Website: https://www.saintandrews.org/2020/07/03/emergency-family-shelter/     Shelter for individuals  10  [facility] [place] [facility] [place] [facility] Distance: 0.89 miles      In-Person   [address] [place] [address]  Language: English  Hours: Mon - Sun 5:00 PM - 10:00 AM  Fees: Free, Self Pay   Phone: [phone] [phone] Email: info@[employer] Website: https://www.[employer]/locations/[facility]-[place]/     11  [facility] - Coordinated Access to Housing and Shelter (CAHS) - Coordinated  Access - Emergency housing Distance: 1.66 miles      In-Person, Phone/Virtual   [address] [place] [address]  Language: English  Hours: Mon - Fri 8:00 AM - 4:30 PM  Fees: Free   Phone: (503) [phone] Website: https://[employer]/residents/assistance-support/assistance/housing-services-support          Important Numbers & Websites       Emergency Services   911  [facility] Services   311  Poison Control   (937) 977-4558  Suicide Prevention Lifeline   (518) 915-4899 (TALK)  Child Abuse Hotline   (735) 637-6078 (4-A-Child)  Sexual Assault Hotline   (608) 820-9906 (HOPE)  National Runaway Safeline   (462) 831-5615 (RUNAWAY)  All-Options Talkline   (909) 244-3292  Substance Abuse Referral   (792) 160-8576 (HELP)

## 2023-12-12 NOTE — COMMUNITY RESOURCES LIST (ENGLISH)
12/12/2023   [facility] - Outpatient Clinics  N/A  For additional resource needs, please contact your health insurance member services or your primary care team.  Phone: [phone]   Email: N/A   Address: [facility] [place] [address]   Hours: N/A        Hotlines and Helplines       Hotline - Housing crisis  1  [facility] Distance: 6.96 miles      Phone/Virtual   [address] [place] [address]  Language: English  Hours: Mon - Sun Open 24 Hours   Phone: [phone] [phone] Email: communications@oscs-[place].org Website: https://oscs-[place].org/oursaviourshousing/     2  [facility] Distance: 8.54 miles      Phone/Virtual   [address] [place] [address]  Language: English  Hours: Mon - Sun Open 24 Hours   Phone: [phone] Email: info@[facility].org Website: http://www.[facility].org          Housing       Coordinated Entry access point  3  [facility] Distance: 0.94 miles      In-Person, Phone/Virtual   [address] [place] [address]  Language: English  Hours: Mon - Fri 8:30 AM - 4:30 PM  Fees: Free   Phone: [phone]) [phone] Email: info@[facility].org Website: https://www.[facility].org/locations/[facility]/     4  [facility] - Coordinated Access to Housing and Shelter (CAHS) - Coordinated Access - Coordinated Entry access point Distance: 1.66 miles      In-Person, Phone/Virtual   [address] [place] [address]  Language: English  Hours: Mon - Fri 8:00 AM - 4:30 PM  Fees: Free   Phone: (541) [phone] Website: https://www.[facility]./residents/assistance-support/assistance/housing-services-support     Drop-in center or day shelter  5  [facility] Distance: 2.38 miles      In-Person   [address] [place] [address]  Language: English  Hours: Mon - Fri 9:00 AM - 4:00 PM  Fees: Free   Phone: (037) [phone] Email: frontchicok@listeninghouse.org Website:  http://listeninghouse.org     6  [facility] Distance: 7.06 miles      In-Person   [address] [place] [address]  Language: English, Belarusian, Yi  Hours: Mon - Sat 7:00 AM - 3:00 PM  Fees: Free, Self Pay   Phone: (518) [phone] Email: info@[employer] Website: https://www.[employer]/locations/opportunity-center/     Housing search assistance  7  [employer] - https://[facility]/ Distance: 7.34 miles      Phone/Virtual   [address] [place] [address]  Language: English  Hours: Mon - Sun Open 24 Hours   Email: info@[employer] Website: https://[facility]     8  [employer] - Online housing search assistance Distance: 8.5 miles      Phone/Virtual   [address] [address]  Language: English, Hmong, Belarusian, Yi  Hours: Mon - Sun Open 24 Hours   Phone: (582) 674-6527 Email: info@hotelsmap.comorg Website: http://www.OnApplink.org/     Shelter for families  9  [facility] Distance: 15.75 miles      In-Person   [address] [place] [address]  Language: English  Hours: Mon - Fri 3:00 PM - 9:00 AM , Sat - Sun Open 24 Hours  Fees: Free   Phone: (636) 460-1116 Ext.1 Website: https://www.saintandExhibia.org/2020/07/03/emergency-family-shelter/     Shelter for individuals  10  [facility] [place] [facility] - [facility] [place] [facility] Distance: 0.89 miles      In-Person   [address] [place] [address]  Language: English  Hours: Mon - Sun 5:00 PM - 10:00 AM  Fees: Free, Self Pay   Phone: (141) 411-3507 Email: info@[employer] Website: https://www.[employer]/locations/[facility]-s[place]/     11  [facility] - Coordinated Access to Housing and Shelter (CAHS) - Coordinated Access - Emergency housing Distance: 1.66 miles       In-Person, Phone/Virtual   [address] [place] [address]  Language: English  Hours: Mon - Fri 8:00 AM - 4:30 PM  Fees: Free   Phone: (616) 780-4088 Website: https://www.[employer]./residents/assistance-support/assistance/housing-services-support          Important Numbers & Websites       [address].[facility]  Poison Control   (208) 773-5367 [place]poison.org  Suicide and Crisis Lifeline   [address] [place], [place] [address]32line.org  Childhelp [place] Child Abuse Hotline   400.769.4064 Childhelphotline.org  [place] Sexual Assault Hotline   (860) 168-6662 (HOPE) [facility] Runaway Safeline   (873) 625-6827 (RUNAWAY) [facility]unaway.org  Pregnancy & Postpartum Support [place]   Call/text [phone] Ppsupportmn.org  Substance Abuse National Helpline ([facility]   747-692-HELP (4267) Findtreatment.gov  Emergency Services   911

## 2023-12-12 NOTE — COMMUNITY RESOURCES LIST (PATIENT PREFERRED LANGUAGE)
12/12/2023   [facility] - Outpatient Clinics  N/A  N?u có th?c m?c v? francia nicolasa tamayo nguyên này ho?c các maritza c?u ch?m sóc b? donita vui lòng liên h? v?i phòng khám ch?m sóc chính ho?c ng??i qu?n lý ch?m sóc c?a b?n.  Phone: [phone]   Email: N/A   Address: [address] [place] [address]   Hours: N/A        ???ng dây nóng và ???ng dây tr? giúp       ???ng dây nóng - Kh?ng ho?ng nhà ?  1  Nhà ? c?a ??ng C?u R?i Emani?ng cách: 6.96 d?m      ?i?n tho?i/?o   [address] [place] [address]  Ngôn ng?: Ti?ng [place]  Gi?: Th? jv - ch? nh?t M? c?a 24 gi?   Phone: [phone] Email: communications@oscs-[place].org Website: https://oscs-[place].org/oursaviourshousing/     2  N?n t?ng - [place] Emani?ng cách: 8.54 d?m      ?i?n tho?i/?o   [address] [place] [address]  Ngôn ng?: Ti?ng [place]  Gi?: Th? jv - ch? nh?t M? c?a 24 gi?   Phone: [phone] Email: info@cornerstonemn.org Website: http://www.cornerstonemn.org          Nhà ?       ?i?m truy c?p m?c nh?p ph?i h?p  3  Ch?m sóc c?ng ??ng [place] - Phòng khám [facility] Emani?ng cách: 0.94 d?m      M?t ??i m?t, ?i?n tho?i/?o   [address] [place] [address]  Ngôn ng?: Ti?ng [place]  Gi?: Th? jv - Th? sáu 8:30 SA - 4:30 CH  Phí: Mi?n phí   Phone: [phone] Email: info@[facility].org Website: https://www.[facility].org/locations/[facility]/     4  D?ch v? Nhân sinh và Y t? Qu?n Rivera - Ti?p c?n Ph?i h?p v?i Nhà ? và N?i t?m trú (CAHS) - Truy c?p Ph?i h?p - ?i?m truy c?p Ph?i h?p Emani?ng cách: 1.66 d?m      M?t ??i m?t, ?i?n tho?i/?o   [address] [place] [address]  Ngôn ng?: Ti?ng [place]  Gi?: Th? jv - Th? sáu 8:00 SA - 4:30 CH  Phí: Mi?n phí   Phone: [phone] Website: https://www.[facility]./residents/assistance-support/assistance/housing-services-support     Cody tâm t?m trú ho?c n?i trú ?n ban ngày  5  Nhà l?ng nghe Thánh Phaolô Emani?ng cách: 2.38 d?m      M?t ??i m?t   [address] [place] [address]  Ngôn  ng?: Ti?ng [place]  Gi?: Th? jv - Th? sáu 9:00 SA - 4:00 CH  Phí: Mi?n phí   Phone: [phone] Email: deven@[employer].org Website: http://[employer].coresystems     6  T? ch?c t? thi?n Công giáo [facility] tâm C? h?i Emani?ng cách: 7.06 d?m      M?t ??i m?t   [address] [place] [address]  Ngôn ng?: ng??i Tây Ban Nha, Ti?ng [place], ti?ng Papua [place] Guinean  Gi?: Th? jv - ngày th? b?y 7:00 SA - 3:00 CH  Phí: Mi?n phí, T? tr?   Phone: [phone] Email: info@[employer] Website: https://www.[employer]/locations/opportunity-center/     H? tr? tìm ki?m nhà ?  7  Nhà ? giá c? ph?i ch?ng tr?c bird?n - https://[employer]/ Emani?ng cách: 7.34 d?m      ?i?n tho?i/?o   [address] [place] [address]  Ngôn ng?: Ti?ng [place]  Gi?: Th? jv - ch? nh?t M? c?a 24 gi?   Email: info@[employer] Website: https://[employer]     8  Liên k?t nhà ? - H? tr? tìm ki?m nhà ? tr?c brid?n Emani?ng cách: 8.5 d?m      ?i?n tho?i/?o   [address] [address]  Ngôn ng?: ng??i Mông, ng??i Tây Ban Nha, Ti?ng [place], ti?ng [place]inean  Gi?: Th? jv - ch? nh?t M? c?a 24 gi?   Phone: [phone] Email: info@[employer].coresystems Website: http://www.PropertyBridge.org/     N?i trú ?n cho carol ?ình  9  N?i t?m trú c?a carol ?ình [facility] [place] Emani?ng cách: 15.75 d?m      M?t ??i m?t   [address] [place] [address]  Ngôn ng?: Ti?ng [place]  Gi?: Th? jv - Th? sáu 3:00 CH - 9:00 SA , ngày th? b?y - ch? nh?t M? c?a 24 gi?  Phí: Mi?n phí   Phone: [phone] Ex.1 Website: https://www.saintandrews.coresystems/2020/07/03/emergency-family-shelter/     N?i trú ?n cho cá nhân  10  T? ch?c t? thi?n Công giáo c?a [facility] - N?i trú ?n [place] ? vùng ??t florentino h?n - N?i trú ?n [place] ? vùng ??t florentino h?n Emani?ng cách: 0.89 d?m      M?t ??i m?t   [address] [place] [address]  Ngôn ng?: Ti?ng [place]  Gi?: Th? jv - ch? nh?t 5:00 CH - 10:00 SA  Phí: Mi?n phí, T?  tr?   Phone: [phone] Email: info@[employer]ior[employer].[facility] Website: https://www.[facility].org/locations/higher-ground-[place]/     11  D?ch v? Nhân sinh và Y t? Qu?n Rivera - Ti?p c?n Ph?i h?p v?i Nhà ? và N?i t?m trú (CAHS) - Ti?p c?n Ph?i h?p - Nhà ? kh?n c?p Emani?ng cách: 1.66 d?m      M?t ??i m?t, ?i?n tho?i/?o   [address] [place] [address]  Ngôn ng?: Ti?ng [place]  Gi?: Th? jv - Th? sáu 8:00 SA - 4:30 CH  Phí: Mi?n phí   Phone: [phone] Website: https://www.[facility]./residents/assistance-support/assistance/housing-services-support          Nh?ng con s? và shimon web rachna tr?ng       cách th?ng nh?t   211 211itedPostini.org  Ki?m soát ch?t ??c   [phone] [facility]ison.org  ???ng dây c?u tr? t? t? và kh?ng ho?ng   981 98lifeline.org  ???ng dây nóng qu?c carol v? l?m d?ng tr? em Childhelp   [phone] ChildSouthview Medical Centerphotline.org  ???ng dây nóng qu?c carol v? t?n công tình d?c   [phone] (HOPE) Rainn.org  ???ng dây an toàn b? tr?n qu?c carol   [phone] (RUNAWAY) [facility] Hospitalnaway.org  H? tr? Devon karen & Sandra sinh [place]   Call/text [phone] Ppsupportmn.org  ???ng dây Tr? giúp Qu?c carol v? L?m d?ng Ch?t gây michelle?n ([facility]   [phone] (4357) Findtreatment.gov  Các d?ch v? kh?n c?p   721

## 2023-12-12 NOTE — LETTER
December 15, 2023      Razia Dos Santos  [address]  [place] [address]        Dear ,    We are writing to inform you of your test results.    You do not have any evidence of a compressed vertebrae, as I initially thought.  That is good news.  I cannot explain the back pain that you sometimes have but the xray and urine test were normal  Please follow up in a month or so if yo are not getting better.     Resulted Orders   XR Lumbar Spine 2-3 Views*    Narrative    EXAM: XR LUMBAR SPINE 2/3 VIEWS  LOCATION: [facility]  DATE: 12/12/2023    INDICATION:  Osteopenia of lumbar spine  COMPARISON: Radiograph 01/21/2020.      Impression    IMPRESSION:     Nomenclature is based on five lumbar-type vertebral bodies.    Vertebral body heights are unremarkable.    Alignment is unremarkable.    Mild multilevel spondylosis, most conspicuous at L5-S1.    Pedicles appear symmetric.    The imaged portion of the sacrum appears grossly intact. However, it is partially obscured by stool and bowel gas. Atherosclerotic calcification of the abdominal aorta.       If you have any questions or concerns, please call the clinic at the number listed above.       Sincerely,      Walter He MD

## 2023-12-12 NOTE — LETTER
December 15, 2023      Razia Dos Santos  [address]  [place] [address]        Dear ,    We are writing to inform you of your test results.    These results are within the normal range for you.  There was no protein in the urine.  Please follow up in the clinic as needed.     Resulted Orders   Urine Macroscopic with reflex to Microscopic   Result Value Ref Range    Color Urine Yellow Colorless, Straw, Light Yellow, Yellow    Appearance Urine Clear Clear    Glucose Urine Negative Negative mg/dL    Bilirubin Urine Negative Negative    Ketones Urine Negative Negative mg/dL    Specific Gravity Urine 1.010 1.005 - 1.030    Blood Urine Trace (A) Negative    pH Urine 5.5 5.0 - 8.0    Protein Albumin Urine Negative Negative mg/dL    Urobilinogen Urine 0.2 0.2, 1.0 E.U./dL    Nitrite Urine Negative Negative    Leukocyte Esterase Urine Negative Negative   Urine Microscopic Exam   Result Value Ref Range    Bacteria Urine None Seen None Seen /HPF    RBC Urine None Seen 0-2 /HPF /HPF    WBC Urine None Seen 0-5 /HPF /HPF    Squamous Epithelials Urine None Seen None Seen /LPF       If you have any questions or concerns, please call the clinic at the number listed above.       Sincerely,      Walter He MD

## 2023-12-12 NOTE — COMMUNITY RESOURCES LIST (PATIENT PREFERRED LANGUAGE)
12/12/2023   [facility]  N/A  N?u có th?c m?c v? francia nicolasa tamayo nguyên này ho?c các maritza c?u ch?m sóc b? donita vui lòng liên h? v?i phòng khám ch?m sóc chính ho?c ng??i qu?n lý ch?m sóc c?a b?n.  Phone: [phone]   Email: N/A   Address: [address] [place] [address]   Hours: N/A        ???ng dây nóng và ???ng dây tr? giúp       ???ng dây nóng - Kh?ng ho?ng nhà ?  1  Nhà ? c?a ??ng C?u R?i Emani?ng cách: 6.96 d?m      ?i?n tho?i/?o   [address] [place] [address]  Ngôn ng?: Ti?ng [place]  Gi?: Th? jv - ch? nh?t M? c?a 24 gi?   Phone: [phone] Email: communications@[email or web address].org Website: https://[facility]-[place].org/oursaviourshousing/     2  N?n t?ng - Harbert Emani?ng cách: 8.54 d?m      ?i?n tho?i/?o   [address] [place] [address]  Ngôn ng?: Ti?ng [place]  Gi?: Th? jv - ch? nh?t M? c?a 24 gi?   Phone: [phone] Email: info@[email or web address] Website: http://www.cornerstonemn.org          Nhà ?       ?i?m truy c?p m?c nh?p ph?i h?p  3  Ch?m sóc c?ng ??ng [place] - [place] Emani?ng cách: 0.94 d?m      M?t ??i m?t, ?i?n tho?i/?o   [address] [place] [address]  Ngôn ng?: Ti?ng [place]  Gi?: Th? jv - Th? sáu 8:30 SA - 4:30 CH  Phí: Mi?n phí   Phone: [phone] Email: info@[facility].org Website: https://www.[facility].org/locations/[facility]/     4  D?ch v? Nhân sinh và Y t? Qu?n Rivera - Ti?p c?n Ph?i h?p v?i Nhà ? và N?i t?m trú (CAHS) - Truy c?p Ph?i h?p - ?i?m truy c?p Ph?i h?p Emani?ng cách: 1.66 d?m      M?t ??i m?t, ?i?n tho?i/?o   [address] [place] [address]  Ngôn ng?: Ti?ng [place]  Gi?: Th? jv - Th? sáu 8:00 SA - 4:30 CH  Phí: Mi?n phí   Phone: [phone] Website: https://www.[facility]./residents/assistance-support/assistance/housing-services-support     Cody tâm t?m trú ho?c n?i trú ?n ban ngày  5  Nhà l?ng nghe Thánh Phaolô Emani?ng cách: 2.38 d?m      M?t ??i m?t   [address] [place] [address]  Ngôn ng?: Ti?ng  [place]  Gi?: Th? jv - Th? sáu 9:00 SA - 4:00 CH  Phí: Mi?n phí   Phone: [phone] Email: deven@[facility].org Website: http://[facility].CueThink     6  T? ch?c t? thi?n Công giáo [facility] tâm C? h?i Emani?ng cách: 7.06 d?m      M?t ??i m?t   [address] [place] [address]  Ngôn ng?: ng??i Tây Ban Nha, Ti?ng Ahven, ti?ng Honduran  Gi?: Th? jv - ngày th? b?y 7:00 SA - 3:00 CH  Phí: Mi?n phí, T? tr?   Phone: [phone] Email: info@[employer]Handle Website: https://www.[employer]Handle/locations/opportunity-center/     H? tr? tìm ki?m nhà ?  7  [facility] ?ình - H? tr? Tìm ki?m Nhà ? Emani?ng cách: 2.47 d?m      ?i?n tho?i/?o   [address] [place] [address]  Ngôn ng?: Marlee, ng??i Mông, ng??i Tây Ban Nha, ti?ng Nepali, Ti?ng [place], ti?ng Honduran  Gi?: Th? jv - Th? sáu ?ng d?ng. Ch? m?t  Phí: Mi?n phí   Phone: [phone] Website: https://[facility] HospitalGigMastersmn.CueThink/     8  C?ng ??ng [employer] - Qu?n tr? - H? tr? tìm ki?m nhà ? tr?c bird?n Emani?ng cách: 2.78 d?m      ?i?n tho?i/?o   [address] [place] [address]  Ngôn ng?: Ti?ng [place]  Gi?: Th? jv - ch? nh?t M? c?a 24 gi?  Phí: R?nh r?i   Phone: [phone] Email: [email or web address] Website: https://[employer].org/     N?i trú ?n cho carol ?ình  9  N?i t?m trú c?a carol ?ình [facility] Emani?ng cách: 15.75 d?m      M?t ??i m?t   [address] [place] [address]  Ngôn ng?: Ti?ng [place]  Gi?: Th? jv - Th? sáu 3:00 CH - 9:00 SA , ngày th? b?y - ch? nh?t M? c?a 24 gi?  Phí: Mi?n phí   Phone: [phone] Ex.1 Website: https://www.[facility].org/2020/07/03/emergency-family-shelter/     N?i trú ?n cho cá nhân  10  T? ch?c t? thi?n Công giáo c?a [facility] - N?i trú ?n [place] ? vùng ??t florentino h?n - N?i trú ?n [place] ? vùng ??t florentino h?n Emani?ng cách: 0.89 d?m      M?t ??i m?t   [address] [place] [address]  Ngôn ng?: Ti?ng [place]  Gi?: Th? jv - ch? nh?t  5:00 CH - 10:00 SA  Phí: Mi?n phí, T? tr?   Phone: [phone] Email: info@[employer] Website: https://www.[employer]book[employer]u.Com.[employer]/locations/higher-[facility]-[place]/     11  D?ch v? Nhân sinh và Y t? Qu?n Rivera - Ti?p c?n Ph?i h?p v?i Nhà ? và N?i t?m trú ([facility]) - Ti?p c?n Ph?i h?p - Nhà ? kh?n c?p Emani?ng cách: 1.66 d?m      M?t ??i m?t, ?i?n tho?i/?o   [address] [place] [address]  Ngôn ng?: Ti?ng [place]  Gi?: Th? jv - Th? sáu 8:00 SA - 4:30 CH  Phí: Mi?n phí   Phone: [phone] Website: https://www.[facility]./residents/assistance-support/assistance/housing-services-support          Nh?ng con s? và shimon web rachna tr?ng       Các d?ch v? kh?n c?p   911  D?ch v? thành ph?   311  Ki?m soát ??c   [phone]  ???ng dây phòng ch?ng t? t?   [phone] (TALK)  ???ng dây nóng l?m d?ng tr? em   [phone] (4-A-Child)  ???ng dây nóng t?n công tình d?c   [phone] (HOPE)  ???ng dây ch?y tr?n qu?c carol   [phone] (RUNAWAY)  T?t c? các tùy ch?n Talkline   [phone]  Gi?i thi?u l?m d?ng d??c ch?t   [phone] (HELP)

## 2023-12-12 NOTE — COMMUNITY RESOURCES LIST (ENGLISH)
12/12/2023   [facility]  N/A  For additional resource needs, please contact your health insurance member services or your primary care team.  Phone: [phone]   Email: N/A   Address: [facility] [place] [address]   Hours: N/A        Hotlines and Helplines       Hotline - Housing crisis  1  [facility] Distance: 6.96 miles      Phone/Virtual   [address] [place] [address]  Language: English  Hours: Mon - Sun Open 24 Hours   Phone: [phone] Email: communications@[facility].org Website: https://[facility].org/oursaviourshousing/     2  [facility] Distance: 8.54 miles      Phone/Virtual   [address] [place] [address]  Language: English  Hours: Mon - Sun Open 24 Hours   Phone: [phone] Email: info@[facility].org Website: http://www.[facility].org          Housing       Coordinated Entry access point  3  [facility] Distance: 0.94 miles      In-Person, Phone/Virtual   [address] [place] [address]  Language: English  Hours: Mon - Fri 8:30 AM - 4:30 PM  Fees: Free   Phone: (700) [phone] Email: info@[facility].org Website: https://www.[facility].org/locations/[facility]/     4  [facility] - Coordinated Access to Housing and Shelter ([facility]) - Coordinated Access - Coordinated Entry access point Distance: 1.66 miles      In-Person, Phone/Virtual   [address] [place] [address]  Language: English  Hours: Mon - Fri 8:00 AM - 4:30 PM  Fees: Free   Phone: (433) [phone] Website: https://www.[facility]./residents/assistance-support/assistance/housing-services-support     Drop-in center or day shelter  5  [facility] Distance: 2.38 miles      In-Person   [address] [place] [address]  Language: English  Hours: Mon - Fri 9:00 AM - 4:00 PM  Fees: Free   Phone: (865) [phone] Email: frontchicok@listeninghouse.org Website:  http://listeninghouse.org     6  [facility] Distance: 7.06 miles      In-Person   [address] [place] [address]  Language: English, Yemeni, Lithuanian  Hours: Mon - Sat 7:00 AM - 3:00 PM  Fees: Free, Self Pay   Phone: (707) [phone] Email: info@[employer] Website: https://www.[employer]/locations/opportunity-center/     Housing search assistance  7  24h00 - https://Subway/ Distance: 7.34 miles      Phone/Virtual   [address] [place] [address]  Language: English  Hours: Mon - Sun Open 24 Hours   Email: info@imgfave Website: https://Subway     8  [facility] - Online housing search assistance Distance: 8.5 miles      Phone/Virtual   [address] [address]  Language: English, Hmong, Yemeni, Lithuanian  Hours: Mon - Sun Open 24 Hours   Phone: (366) 231-8977 Email: info@MiniVaxorg Website: http://www.[facility].org/     Shelter for families  9  [facility] Distance: 15.75 miles      In-Person   [address] [place] [address]  Language: English  Hours: Mon - Fri 3:00 PM - 9:00 AM , Sat - Sun Open 24 Hours  Fees: Free   Phone: (500) 297-4264 Ext.1 Website: https://www.[facility] [employer].org/2020/07/03/emergency-family-shelter/     Shelter for individuals  10  [facility] [place] [facility] [place] [facility] Distance: 0.89 miles      In-Person   [address] [place] [address]  Language: English  Hours: Mon - Sun 5:00 PM - 10:00 AM  Fees: Free, Self Pay   Phone: (459) 464-1286 Email: info@[employer] Website: https://www.[employer]/locations/[facility]/     11  [facility] - Coordinated Access to Housing and Shelter (CAHS) - Coordinated Access - Emergency housing Distance: 1.66 miles       In-Person, Phone/Virtual   [address] [place] [address]  Language: English  Hours: Mon - Fri 8:00 AM - 4:30 PM  Fees: Free   Phone: (990) 299-9881 Website: https://www.[employer]./residents/assistance-support/assistance/housing-services-support          Important Numbers & Websites       [address].[facility]  Poison Control   (167) 739-8609 [place]po[place].org  Suicide and Crisis Lifeline   [address] [place], [place] [address]line.org  Childhelp [place] Child Abuse Hotline   356.987.7525 Childhelphotline.org  [place] Sexual Assault Hotline   (184) 677-8269 (HOPE) [facility] Runaway Safeline   (250) 532-2238 (RUNAWAY) [facility].org  Pregnancy & Postpartum Support [place]   Call/text 774-129-0385 Ppsupportmn.org  Substance Abuse National Helpline ([facility]   251-524-HELP (7672) Findtreatment.gov  Emergency Services   911

## 2023-12-13 NOTE — RESULT ENCOUNTER NOTE
You do not have any evidence of a compressed vertebrae, as I initially thought.  That is good news.  I cannot explain the back pain that you sometimes have but the xray and urine test were normal  Please follow up in a month or so if yo are not getting better.

## 2023-12-13 NOTE — RESULT ENCOUNTER NOTE
These results are within the normal range for you.  There was no protein in the urine.  Please follow up in the clinic as needed.

## 2023-12-26 NOTE — TELEPHONE ENCOUNTER
Attempted to contact pt, left vm with return number and DEXA scan scheduling number.    ROJELIO Benton

## 2024-02-21 DIAGNOSIS — E55.9 VITAMIN D DEFICIENCY: ICD-10-CM

## 2024-02-21 RX ORDER — CHOLECALCIFEROL (VITAMIN D3) 25 MCG
TABLET ORAL
Qty: 100 TABLET | Refills: 3 | Status: SHIPPED | OUTPATIENT
Start: 2024-02-21

## 2024-02-21 NOTE — TELEPHONE ENCOUNTER
Medication requested: VITAMIN D3 25 MCG TABLET   Last office visit: 12/12/23  Future [facility] appointments: none  Medication last refilled: 11/12/23  Last qualifying labs: none    Prescription approved per [facility] Refill Protocol.  Vitamin Supplements Protocol Ifswee9002/21/2024 12:36 AM   Protocol Details The patient does not have an order for high-dose vitamin D    Medication is active on med list    Medication indicated for associated diagnosis    The patient is 2 years of age or older    Recent (12 mo) or future (90 days) visit within the authorizing provider's specialty     AYLIN East, BSN

## 2024-04-02 NOTE — PROGRESS NOTES
The longitudinal plan of care for the diagnosis(es)/condition(s) as documented were addressed during this visit. Due to the added complexity in care, I will continue to support Razia in the subsequent management and with ongoing continuity of care.    Assessment & Plan     Need for vaccination against respiratory syncytial virus  I printed off information about getting this at the [facility] in [employer].    Itching  Check labs to make sure there is not a systemic cause for this.  Assuming this is related to dry skin she can continue to use moisturizing creams.  I offered something to help with pruritus but she did not want to take any pills for that.  - Comprehensive metabolic panel; Future  - TSH with free T4 reflex; Future  - CBC with platelets; Future    Prediabetes  Check labs again today.  - Hemoglobin A1c; Future    Pure hypercholesterolemia  She would like to switch from simvastatin to something else.  Rosuvastatin is hydrophilic and seems to have less side effects so we will change to that.  - rosuvastatin (CRESTOR) 10 MG tablet; Take 1 tablet (10 mg) by mouth daily    Lightheadedness  Her symptoms are fairly intermittent, may be happening once or twice per month.  She does not have orthostatic blood pressure change today.  I do not think we will make any changes at this time but if her symptoms persist we can look into this further.  For now, would like her to check her blood pressure once a day for the next 2 weeks and get me a copy of those results.    Nail dystrophy  Will check labs today.  - Iron & Iron Binding Capacity; Future  - Ferritin; Future    Other fatigue  Check labs today.    - Iron & Iron Binding Capacity; Future  - Ferritin; Future    Abnormal finding of blood chemistry, unspecified    - Iron & Iron Binding Capacity; Future  - Ferritin; Future    Diagnosis or treatment significantly limited by social determinants of health - language barrier  30 minutes spent by me on the date of the encounter doing  "chart review, review of test results, patient visit, and documentation       BMI  Estimated body mass index is 26.78 kg/m  as calculated from the following:    Height as of this encounter: 1.537 m (5' 0.5\").    Weight as of this encounter: 63.2 kg (139 lb 6.4 oz).       Angela Randle is a 68 year old, presenting for the following health issues:  No chief complaint on file.        4/3/2024     1:13 PM   Additional Questions   Roomed by Mao   Accompanied by self         4/3/2024    Information    services provided? Yes   Language Zimbabwean   Type of interpretation provided Face-to-face    name Nick Barajas    Agency [employer]       There had been swelling of the right ear but this improved on its own.    There is diffuse pruritus but no rash.  Present for a few years.    She feels \"lightheaded\" at times when she stands up, turns, or even when she sits down.    She had a stress test 2 years ago showing:  Negative pharmacological regadenoson ECG for ischemia.  2.The nuclear stress test is negative for inducible myocardial ischemia or infarction.  3.The left ventricular ejection fraction at stress is greater than 70%.    She is up-to-date on her DEXA scan.  She has osteopenia.          Review of Systems  Constitutional, HEENT, cardiovascular, pulmonary, gi and gu systems are negative, except as otherwise noted.      Objective    /81   Pulse 86   Temp 98.9  F (37.2  C) (Oral)   Resp 16   Ht 1.537 m (5' 0.5\")   Wt 63.2 kg (139 lb 6.4 oz)   SpO2 99%   BMI 26.78 kg/m    Body mass index is 26.78 kg/m .  Physical Exam   GENERAL: alert and no distress  NECK: no adenopathy, no asymmetry, masses, or scars  ENT: skin tags on the right pinna.  TMs normal color and landmarks bilaterally.  RESP: lungs clear to auscultation - no rales, rhonchi or wheezes  CV: regular rate and rhythm, no murmur  ABDOMEN: obese, soft, nontender  MS: no gross musculoskeletal defects " noted, no edema  Skin: Ridged nails mostly on the thumbs bilaterally with may be scant pitting of one of the other nails but nothing diffuse.   sitting and 140 standing.    Signed Electronically by: Walter He MD

## 2024-04-03 ENCOUNTER — OFFICE VISIT (OUTPATIENT)
Dept: FAMILY MEDICINE | Facility: CLINIC | Age: 69
End: 2024-04-03
Payer: COMMERCIAL

## 2024-04-03 VITALS
SYSTOLIC BLOOD PRESSURE: 122 MMHG | OXYGEN SATURATION: 99 % | DIASTOLIC BLOOD PRESSURE: 81 MMHG | RESPIRATION RATE: 16 BRPM | TEMPERATURE: 98.9 F | WEIGHT: 139.4 LBS | BODY MASS INDEX: 26.32 KG/M2 | HEIGHT: 61 IN | HEART RATE: 86 BPM

## 2024-04-03 DIAGNOSIS — R42 LIGHTHEADEDNESS: ICD-10-CM

## 2024-04-03 DIAGNOSIS — L29.9 ITCHING: Primary | ICD-10-CM

## 2024-04-03 DIAGNOSIS — R53.83 OTHER FATIGUE: ICD-10-CM

## 2024-04-03 DIAGNOSIS — L60.3 NAIL DYSTROPHY: ICD-10-CM

## 2024-04-03 DIAGNOSIS — Z29.11 NEED FOR VACCINATION AGAINST RESPIRATORY SYNCYTIAL VIRUS: ICD-10-CM

## 2024-04-03 DIAGNOSIS — R73.03 PREDIABETES: ICD-10-CM

## 2024-04-03 DIAGNOSIS — R79.9 ABNORMAL FINDING OF BLOOD CHEMISTRY, UNSPECIFIED: ICD-10-CM

## 2024-04-03 DIAGNOSIS — E78.00 PURE HYPERCHOLESTEROLEMIA: ICD-10-CM

## 2024-04-03 LAB
ERYTHROCYTE [DISTWIDTH] IN BLOOD BY AUTOMATED COUNT: 11.8 % (ref 10–15)
HBA1C MFR BLD: 5.8 % (ref 0–5.6)
HCT VFR BLD AUTO: 40.8 % (ref 35–47)
HGB BLD-MCNC: 13.6 G/DL (ref 11.7–15.7)
MCH RBC QN AUTO: 29.8 PG (ref 26.5–33)
MCHC RBC AUTO-ENTMCNC: 33.3 G/DL (ref 31.5–36.5)
MCV RBC AUTO: 90 FL (ref 78–100)
PLATELET # BLD AUTO: 223 10E3/UL (ref 150–450)
RBC # BLD AUTO: 4.56 10E6/UL (ref 3.8–5.2)
WBC # BLD AUTO: 4.8 10E3/UL (ref 4–11)

## 2024-04-03 PROCEDURE — 82728 ASSAY OF FERRITIN: CPT | Performed by: FAMILY MEDICINE

## 2024-04-03 PROCEDURE — 83036 HEMOGLOBIN GLYCOSYLATED A1C: CPT | Performed by: FAMILY MEDICINE

## 2024-04-03 PROCEDURE — 90480 ADMN SARSCOV2 VAC 1/ONLY CMP: CPT | Performed by: FAMILY MEDICINE

## 2024-04-03 PROCEDURE — 91320 SARSCV2 VAC 30MCG TRS-SUC IM: CPT | Performed by: FAMILY MEDICINE

## 2024-04-03 PROCEDURE — 84443 ASSAY THYROID STIM HORMONE: CPT | Performed by: FAMILY MEDICINE

## 2024-04-03 PROCEDURE — 99214 OFFICE O/P EST MOD 30 MIN: CPT | Mod: 25 | Performed by: FAMILY MEDICINE

## 2024-04-03 PROCEDURE — 83540 ASSAY OF IRON: CPT | Performed by: FAMILY MEDICINE

## 2024-04-03 PROCEDURE — 85027 COMPLETE CBC AUTOMATED: CPT | Performed by: FAMILY MEDICINE

## 2024-04-03 PROCEDURE — 80053 COMPREHEN METABOLIC PANEL: CPT | Performed by: FAMILY MEDICINE

## 2024-04-03 PROCEDURE — 36415 COLL VENOUS BLD VENIPUNCTURE: CPT | Performed by: FAMILY MEDICINE

## 2024-04-03 PROCEDURE — 83550 IRON BINDING TEST: CPT | Performed by: FAMILY MEDICINE

## 2024-04-03 RX ORDER — ROSUVASTATIN CALCIUM 10 MG/1
10 TABLET, COATED ORAL DAILY
Qty: 90 TABLET | Refills: 3 | Status: SHIPPED | OUTPATIENT
Start: 2024-04-03

## 2024-04-03 RX ORDER — RESPIRATORY SYNCYTIAL VIRUS VACCINE 120MCG/0.5
0.5 KIT INTRAMUSCULAR ONCE
Qty: 1 EACH | Refills: 0 | Status: CANCELLED | OUTPATIENT
Start: 2024-04-03 | End: 2024-04-03

## 2024-04-03 NOTE — PATIENT INSTRUCTIONS
Stop taking the simvastatin.    Start taking rosuvastatin (Crestor) instead.      The RSV vaccine is recommended for patients over the age of 60.  It is a single injection and is available in most pharmacies.      [facility]  They prefer that you schedule your appointment online:  [email or web address]://www.[employer]./findcare/schedule-vaccine#/location     [facility]   They prefer that you schedule your appointment online:  [email or web address]

## 2024-04-03 NOTE — LETTER
April 5, 2024      Razia Dos Santos  [address]  [place] [address]        Dear ,    We are writing to inform you of your test results.    Your calcium was a little bit higher than I would have expected and its higher than it has been in the past.     Could you come back in a month for a repeat blood test?     Resulted Orders   Ferritin   Result Value Ref Range    Ferritin 307 11 - 328 ng/mL   Iron & Iron Binding Capacity   Result Value Ref Range    Iron 81 37 - 145 ug/dL    Iron Binding Capacity 247 240 - 430 ug/dL    Iron Sat Index 33 15 - 46 %   CBC with platelets   Result Value Ref Range    WBC Count 4.8 4.0 - 11.0 10e3/uL    RBC Count 4.56 3.80 - 5.20 10e6/uL    Hemoglobin 13.6 11.7 - 15.7 g/dL    Hematocrit 40.8 35.0 - 47.0 %    MCV 90 78 - 100 fL    MCH 29.8 26.5 - 33.0 pg    MCHC 33.3 31.5 - 36.5 g/dL    RDW 11.8 10.0 - 15.0 %    Platelet Count 223 150 - 450 10e3/uL   TSH with free T4 reflex   Result Value Ref Range    TSH 1.80 0.30 - 4.20 uIU/mL   Comprehensive metabolic panel   Result Value Ref Range    Sodium 143 135 - 145 mmol/L      Comment:      Reference intervals for this test were updated on 09/26/2023 to more accurately reflect our healthy population. There may be differences in the flagging of prior results with similar values performed with this method. Interpretation of those prior results can be made in the context of the updated reference intervals.     Potassium 4.4 3.4 - 5.3 mmol/L    Carbon Dioxide (CO2) 30 (H) 22 - 29 mmol/L    Anion Gap 12 7 - 15 mmol/L    Urea Nitrogen 12.6 8.0 - 23.0 mg/dL    Creatinine 0.89 0.51 - 0.95 mg/dL    GFR Estimate 70 >60 mL/min/1.73m2    Calcium 10.8 (H) 8.8 - 10.2 mg/dL    Chloride 101 98 - 107 mmol/L    Glucose 127 (H) 70 - 99 mg/dL    Alkaline Phosphatase 78 40 - 150 U/L      Comment:      Reference intervals for this test were updated on 11/14/2023 to more accurately reflect our healthy population. There may be differences in the flagging of  prior results with similar values performed with this method. Interpretation of those prior results can be made in the context of the updated reference intervals.    AST 27 0 - 45 U/L      Comment:      Reference intervals for this test were updated on 6/12/2023 to more accurately reflect our healthy population. There may be differences in the flagging of prior results with similar values performed with this method. Interpretation of those prior results can be made in the context of the updated reference intervals.    ALT 22 0 - 50 U/L      Comment:      Reference intervals for this test were updated on 6/12/2023 to more accurately reflect our healthy population. There may be differences in the flagging of prior results with similar values performed with this method. Interpretation of those prior results can be made in the context of the updated reference intervals.      Protein Total 8.1 6.4 - 8.3 g/dL    Albumin 4.8 3.5 - 5.2 g/dL    Bilirubin Total 0.4 <=1.2 mg/dL   Hemoglobin A1c   Result Value Ref Range    Hemoglobin A1C 5.8 (H) 0.0 - 5.6 %      Comment:      Normal <5.7%   Prediabetes 5.7-6.4%    Diabetes 6.5% or higher     Note: Adopted from ADA consensus guidelines.       If you have any questions or concerns, please call the clinic at the number listed above.       Sincerely,      Walter He MD

## 2024-04-03 NOTE — LETTER
April 4, 2024      Razia Dos Santos  [address]  [place] [address]        Dear ,    We are writing to inform you of your test results.    These results are within the normal range for you.  You do not have diabetes.  You do not need to be on any additional medications.  You do have prediabetes and will want to check this blood test again next year.  Please follow up in the clinic as directed.     Resulted Orders   CBC with platelets   Result Value Ref Range    WBC Count 4.8 4.0 - 11.0 10e3/uL    RBC Count 4.56 3.80 - 5.20 10e6/uL    Hemoglobin 13.6 11.7 - 15.7 g/dL    Hematocrit 40.8 35.0 - 47.0 %    MCV 90 78 - 100 fL    MCH 29.8 26.5 - 33.0 pg    MCHC 33.3 31.5 - 36.5 g/dL    RDW 11.8 10.0 - 15.0 %    Platelet Count 223 150 - 450 10e3/uL   Hemoglobin A1c   Result Value Ref Range    Hemoglobin A1C 5.8 (H) 0.0 - 5.6 %      Comment:      Normal <5.7%   Prediabetes 5.7-6.4%    Diabetes 6.5% or higher     Note: Adopted from ADA consensus guidelines.       If you have any questions or concerns, please call the clinic at the number listed above.       Sincerely,      Walter He MD

## 2024-04-03 NOTE — RESULT ENCOUNTER NOTE
These results are within the normal range for you.  You do not have diabetes.  You do not need to be on any additional medications.  You do have prediabetes and will want to check this blood test again next year.  Please follow up in the clinic as directed.

## 2024-04-04 LAB
ALBUMIN SERPL BCG-MCNC: 4.8 G/DL (ref 3.5–5.2)
ALP SERPL-CCNC: 78 U/L (ref 40–150)
ALT SERPL W P-5'-P-CCNC: 22 U/L (ref 0–50)
ANION GAP SERPL CALCULATED.3IONS-SCNC: 12 MMOL/L (ref 7–15)
AST SERPL W P-5'-P-CCNC: 27 U/L (ref 0–45)
BILIRUB SERPL-MCNC: 0.4 MG/DL
BUN SERPL-MCNC: 12.6 MG/DL (ref 8–23)
CALCIUM SERPL-MCNC: 10.8 MG/DL (ref 8.8–10.2)
CHLORIDE SERPL-SCNC: 101 MMOL/L (ref 98–107)
CREAT SERPL-MCNC: 0.89 MG/DL (ref 0.51–0.95)
DEPRECATED HCO3 PLAS-SCNC: 30 MMOL/L (ref 22–29)
EGFRCR SERPLBLD CKD-EPI 2021: 70 ML/MIN/1.73M2
FERRITIN SERPL-MCNC: 307 NG/ML (ref 11–328)
GLUCOSE SERPL-MCNC: 127 MG/DL (ref 70–99)
IRON BINDING CAPACITY (ROCHE): 247 UG/DL (ref 240–430)
IRON SATN MFR SERPL: 33 % (ref 15–46)
IRON SERPL-MCNC: 81 UG/DL (ref 37–145)
POTASSIUM SERPL-SCNC: 4.4 MMOL/L (ref 3.4–5.3)
PROT SERPL-MCNC: 8.1 G/DL (ref 6.4–8.3)
SODIUM SERPL-SCNC: 143 MMOL/L (ref 135–145)
TSH SERPL DL<=0.005 MIU/L-ACNC: 1.8 UIU/ML (ref 0.3–4.2)

## 2024-04-05 DIAGNOSIS — E83.52 HYPERCALCEMIA: Primary | ICD-10-CM

## 2024-04-05 NOTE — RESULT ENCOUNTER NOTE
Your calcium was a little bit higher than I would have expected and its higher than it has been in the past.    Could you come back in a month for a repeat blood test?

## 2024-05-22 ENCOUNTER — OFFICE VISIT (OUTPATIENT)
Dept: FAMILY MEDICINE | Facility: CLINIC | Age: 69
End: 2024-05-22
Payer: COMMERCIAL

## 2024-05-22 VITALS
HEIGHT: 61 IN | BODY MASS INDEX: 25.79 KG/M2 | TEMPERATURE: 98.1 F | DIASTOLIC BLOOD PRESSURE: 81 MMHG | OXYGEN SATURATION: 97 % | RESPIRATION RATE: 18 BRPM | HEART RATE: 75 BPM | WEIGHT: 136.6 LBS | SYSTOLIC BLOOD PRESSURE: 132 MMHG

## 2024-05-22 DIAGNOSIS — Z29.11 NEED FOR VACCINATION AGAINST RESPIRATORY SYNCYTIAL VIRUS: ICD-10-CM

## 2024-05-22 DIAGNOSIS — E78.00 PURE HYPERCHOLESTEROLEMIA: Primary | ICD-10-CM

## 2024-05-22 DIAGNOSIS — K14.8 TONGUE LESION: ICD-10-CM

## 2024-05-22 DIAGNOSIS — E83.52 HYPERCALCEMIA: ICD-10-CM

## 2024-05-22 PROCEDURE — G2211 COMPLEX E/M VISIT ADD ON: HCPCS | Performed by: FAMILY MEDICINE

## 2024-05-22 PROCEDURE — 36415 COLL VENOUS BLD VENIPUNCTURE: CPT | Performed by: FAMILY MEDICINE

## 2024-05-22 PROCEDURE — 99214 OFFICE O/P EST MOD 30 MIN: CPT | Performed by: FAMILY MEDICINE

## 2024-05-22 PROCEDURE — 82306 VITAMIN D 25 HYDROXY: CPT | Performed by: FAMILY MEDICINE

## 2024-05-22 PROCEDURE — 80061 LIPID PANEL: CPT | Performed by: FAMILY MEDICINE

## 2024-05-22 PROCEDURE — 80048 BASIC METABOLIC PNL TOTAL CA: CPT | Performed by: FAMILY MEDICINE

## 2024-05-22 RX ORDER — RESPIRATORY SYNCYTIAL VIRUS VACCINE 120MCG/0.5
0.5 KIT INTRAMUSCULAR ONCE
Qty: 1 EACH | Refills: 0 | Status: CANCELLED | OUTPATIENT
Start: 2024-05-22 | End: 2024-05-22

## 2024-05-23 LAB
ANION GAP SERPL CALCULATED.3IONS-SCNC: 13 MMOL/L (ref 7–15)
BUN SERPL-MCNC: 16 MG/DL (ref 8–23)
CALCIUM SERPL-MCNC: 10.4 MG/DL (ref 8.8–10.2)
CHLORIDE SERPL-SCNC: 101 MMOL/L (ref 98–107)
CHOLEST SERPL-MCNC: 162 MG/DL
CREAT SERPL-MCNC: 0.87 MG/DL (ref 0.51–0.95)
DEPRECATED HCO3 PLAS-SCNC: 27 MMOL/L (ref 22–29)
EGFRCR SERPLBLD CKD-EPI 2021: 72 ML/MIN/1.73M2
FASTING STATUS PATIENT QL REPORTED: ABNORMAL
FASTING STATUS PATIENT QL REPORTED: ABNORMAL
GLUCOSE SERPL-MCNC: 98 MG/DL (ref 70–99)
HDLC SERPL-MCNC: 43 MG/DL
LDLC SERPL CALC-MCNC: 72 MG/DL
NONHDLC SERPL-MCNC: 119 MG/DL
POTASSIUM SERPL-SCNC: 4.6 MMOL/L (ref 3.4–5.3)
SODIUM SERPL-SCNC: 141 MMOL/L (ref 135–145)
TRIGL SERPL-MCNC: 237 MG/DL
VIT D+METAB SERPL-MCNC: 49 NG/ML (ref 20–50)

## 2024-05-23 NOTE — PROGRESS NOTES
"  Assessment & Plan   The longitudinal plan of care for the diagnosis(es)/condition(s) as documented were addressed during this visit. Due to the added complexity in care, I will continue to support Razia in the subsequent management and with ongoing continuity of care.  Pure hypercholesterolemia    - Lipid panel reflex to direct LDL Non-fasting; Future  - Lipid panel reflex to direct LDL Non-fasting    Hypercalcemia    - Basic metabolic panel; Future  - Basic metabolic panel    Tongue lesion  She has some degree of \"white tongue.\"  Also she is noticed additional \"bumps\" on the tongue which are in fact the circumvallate papillae of the tongue.    The white coating should improve with proper brushing of the tongue twice a day with a firm toothbrush and toothpaste.  Otherwise the circumvallate papillae are normal finding on the tongue.          BMI  Estimated body mass index is 26.24 kg/m  as calculated from the following:    Height as of this encounter: 1.537 m (5' 0.5\").    Weight as of this encounter: 62 kg (136 lb 9.6 oz).       Subjective   Razia is a 68 year old, presenting for the following health issues:  Tongue Lesion(S) (Bumps on tongue, noticed a month ago when cleaning tongue)      5/22/2024     1:18 PM   Additional Questions   Roomed by Chuyita         5/22/2024    Information    services provided? Yes   Language Faroese   Type of interpretation provided Face-to-face    name Cody    Agency [employer]   This patient is coming in for a few concerns.  She is following up on her medication changes and she also previously had an elevated calcium and wanted to discuss that.    Also she noticed some bumps on her tongue and was concerned about that.    We did review her lab test that were obtained at her previous visit and these were all within normal limits except for the slight elevation of calcium.    Review of Systems  There is no chest pain or pressure, no " "dyspnea on exertion, no cough, no hemoptysis, no nausea, vomiting, constipation, diarrhea, blood in the stool, black stools or urinary symptoms.      Objective    /81   Pulse 75   Temp 98.1  F (36.7  C)   Resp 18   Ht 1.537 m (5' 0.5\")   Wt 62 kg (136 lb 9.6 oz)   SpO2 97%   BMI 26.24 kg/m    Body mass index is 26.24 kg/m .  Physical Exam   GENERAL: alert and no distress  ENT: Patient has some prominent taste buds and \"white tongue\"  NECK: no adenopathy, no asymmetry, masses, or scars  RESP: lungs clear to auscultation - no rales, rhonchi or wheezes  CV: regular rate and rhythm, no murmur  ABDOMEN: obese, soft, nontender  MS: no gross musculoskeletal defects noted, no edema        Signed Electronically by: Walter He MD    "

## 2024-05-24 ENCOUNTER — LAB (OUTPATIENT)
Dept: LAB | Facility: CLINIC | Age: 69
End: 2024-05-24
Payer: COMMERCIAL

## 2024-05-24 DIAGNOSIS — E83.52 HYPERCALCEMIA: ICD-10-CM

## 2024-05-24 LAB — CA-I BLD-MCNC: 4.8 MG/DL (ref 4.4–5.2)

## 2024-05-24 PROCEDURE — 82330 ASSAY OF CALCIUM: CPT

## 2024-05-24 PROCEDURE — 36415 COLL VENOUS BLD VENIPUNCTURE: CPT

## 2024-05-24 PROCEDURE — 83970 ASSAY OF PARATHORMONE: CPT

## 2024-05-24 NOTE — LETTER
May 29, 2024      Razia Dos Santos  [address]  [place] [address]        Dear ,    We are writing to inform you of your test results.    These results are within the normal range for you.  This calcium blood test is normal.  Please follow up in the clinic as directed.     Resulted Orders   Ionized Calcium   Result Value Ref Range    Calcium Ionized Whole Blood 4.8 4.4 - 5.2 mg/dL       If you have any questions or concerns, please call the clinic at the number listed above.       Sincerely,      Walter He MD

## 2024-05-25 LAB — PTH-INTACT SERPL-MCNC: 21 PG/ML (ref 15–65)

## 2024-05-25 NOTE — RESULT ENCOUNTER NOTE
These results are within the normal range for you.  This calcium blood test is normal.  Please follow up in the clinic as directed.

## 2024-05-29 DIAGNOSIS — R73.03 PREDIABETES: ICD-10-CM

## 2024-05-29 RX ORDER — ASPIRIN 81 MG
1 TABLET, DELAYED RELEASE (ENTERIC COATED) ORAL 2 TIMES DAILY
Qty: 180 TABLET | Refills: 3 | Status: SHIPPED | OUTPATIENT
Start: 2024-05-29

## 2024-05-29 NOTE — TELEPHONE ENCOUNTER
Name from pharmacy: CALCIUM 600 MG-VIT D3 5 MCG TB          Will file in chart as: Calcium Carb-Cholecalciferol 600-5 MG-MCG TABS    Sig: TAKE 1 TABLET BY MOUTH 2 TIMES DAILY    Disp: 180 tablet    Refills: 3    Start: 5/29/2024    Class: E-Prescribe    Non-formulary For: Prediabetes    Last refill: 3/2/2024    Vitamin Supplements Protocol Htbfgm6505/29/2024 12:53 AM   Protocol Details Medication is active on med list    Medication indicated for associated diagnosis        AYLIN East, BSN

## 2024-05-30 ENCOUNTER — TELEPHONE (OUTPATIENT)
Dept: FAMILY MEDICINE | Facility: CLINIC | Age: 69
End: 2024-05-30
Payer: COMMERCIAL

## 2024-05-30 NOTE — TELEPHONE ENCOUNTER
Test Results        Who ordered the test:  PEMA     Type of test: Lab    Date of test:  05242024     Where was the test performed:  [facility]    What are your questions/concerns?:  NONE    Could we send this information to you in [facility] or would you prefer to receive a phone call?:   Patient would prefer a phone call   Okay to leave a detailed message?: Yes at Cell number on file: With   Telephone Information:   Mobile [phone]       Does patient or caller know when to expect a call? Yes, Nurses will return call within 2-3 business hours.       Alec Tracey on 5/30/2024 at 8:19 AM

## 2024-05-30 NOTE — TELEPHONE ENCOUNTER
Outgoing call placed back to the patient regarding latest test results using Algerian . All questions answered to patient satisfaction. AYLIN Christensen

## 2024-08-31 ENCOUNTER — HEALTH MAINTENANCE LETTER (OUTPATIENT)
Age: 69
End: 2024-08-31

## 2024-09-01 DIAGNOSIS — I10 BENIGN ESSENTIAL HYPERTENSION: Chronic | ICD-10-CM

## 2024-09-04 RX ORDER — LISINOPRIL 20 MG/1
20 TABLET ORAL DAILY
Qty: 90 TABLET | Refills: 3 | Status: SHIPPED | OUTPATIENT
Start: 2024-09-04

## 2024-09-04 NOTE — TELEPHONE ENCOUNTER
Name from pharmacy: LISINOPRIL 20 MG TABLET          Will file in chart as: lisinopril (ZESTRIL) 20 MG tablet    Sig: TAKE 1 TABLET BY MOUTH EVERY DAY    Disp: 90 tablet    Refills: 3    Start: 9/1/2024    Class: E-Prescribe    Non-formulary For: Benign essential hypertension    Last ordered: 8 months ago (12/12/2023) by Walter He MD    Last refill: 8/31/2024    Rx #: [id number]    ACE Inhibitors (Including Combos) Protocol Dahnxr6509/01/2024 04:51 PM   Protocol Details Blood pressure under 140/90 in past 12 months- Clinicial or Patient Reported    Medication is active on med list    Medication indicated for associated diagnosis    Recent (12 mo) or future (90 days) visit within the authorizing provider's specialty    Most recent GFR on file in the past 12 months >30    Patient is age 18 or older    No active pregnancy on record    Normal serum potassium on file in past 12 months    No positive pregnancy test within past 12 months        BP Readings from Last 3 Encounters:   05/22/24 132/81   04/03/24 122/81   12/12/23 (!) 142/81     GFR Estimate   Date Value Ref Range Status   05/22/2024 72 >60 mL/min/1.73m2 Final   03/23/2021 >60 >60 mL/min/1.73m2 Final     Potassium   Date Value Ref Range Status   05/22/2024 4.6 3.4 - 5.3 mmol/L Final   03/08/2022 4.6 3.5 - 5.0 mmol/L Final   03/23/2021 4.6 3.5 - 5.0 mmol/L Final     Prescription approved per [facility] Refill Protocol.  C[place] RN, BSN

## 2024-10-08 ENCOUNTER — APPOINTMENT (OUTPATIENT)
Dept: INTERPRETER SERVICES | Facility: CLINIC | Age: 69
End: 2024-10-08
Payer: COMMERCIAL

## 2024-10-21 DIAGNOSIS — I10 BENIGN ESSENTIAL HYPERTENSION: Chronic | ICD-10-CM

## 2024-10-21 DIAGNOSIS — R73.03 PRE-DIABETES: Primary | ICD-10-CM

## 2024-10-21 NOTE — PROGRESS NOTES
The longitudinal plan of care for the diagnosis(es)/condition(s) as documented were addressed during this visit. Due to the added complexity in care, I will continue to support her in the subsequent management and with ongoing continuity of care.  Diagnosis or treatment significantly limited by social determinants of health -language barrier  30 minutes spent by me on the date of the encounter doing chart review, patient visit, documentation, and discussion with other provider(s)     1. Pre-diabetes    - Hemoglobin A1c  - Basic metabolic panel    2. Benign essential hypertension    - Hemoglobin A1c  - Basic metabolic panel    3. Primary osteoarthritis of both knees  She is not interested in pills, injections, or surgery.  She will try some take up home and go for physical therapy.  - XR Knee AP/Lat Standing Bilateral; Future  - Physical Therapy  Referral; Future       Subjective   Razia is a 69 year old, presenting for the following health issues:  No chief complaint on file.        5/22/2024     1:18 PM   Additional Questions   Roomed by Chuyita CEDEÑO       Of note she does have osteopenia based on a DEXA can last summer. She does not have osteoporosis and her FRAX score gives her a 2% chance of hip fracture and 11% chance of major osteoporotic fracture which is below the treatment threshold.     She comes in with bilateral knee pain.  IT is fine at rest and worse with activity, dillan stairs.  She tried a pain patch.    We have tried Voltaren gel in the past which did not do much.  She does not want surgery or an injection.  She is not interested in pills.  Pills make her tired.    Past Medical History:   Diagnosis Date    HPV (low risk) 3/14/2013    3/2013:  Normal pap, positive for low risk HPV.   H/O ASCUS in 2005, +HPV of undetermined risk in 2012 No history of high grade lesion    Hypertension        Last Comprehensive Metabolic Panel:  Lab Results   Component Value Date     05/22/2024     POTASSIUM 4.6 05/22/2024    CHLORIDE 101 05/22/2024    CO2 27 05/22/2024    ANIONGAP 13 05/22/2024    GLC 98 05/22/2024    BUN 16.0 05/22/2024    CR 0.87 05/22/2024    GFRESTIMATED 72 05/22/2024    HAROON 10.4 (H) 05/22/2024        Hemoglobin A1C   Date Value Ref Range Status   04/03/2024 5.8 (H) 0.0 - 5.6 % Final     Comment:     Normal <5.7%   Prediabetes 5.7-6.4%    Diabetes 6.5% or higher     Note: Adopted from ADA consensus guidelines.   06/09/2023 5.9 (H) 0.0 - 5.6 % Final     Comment:     Normal <5.7%   Prediabetes 5.7-6.4%    Diabetes 6.5% or higher     Note: Adopted from ADA consensus guidelines.   03/08/2022 5.8 (H) 0.0 - 5.6 % Final     Comment:     Normal <5.7%   Prediabetes 5.7-6.4%    Diabetes 6.5% or higher     Note: Adopted from ADA consensus guidelines.   02/22/2021 5.7 4.1 - 5.7 % Final   12/05/2019 5.3 4.1 - 5.7 % Final   11/09/2018 5.7 4.1 - 5.7 % Final        Recent Labs   Lab Test 05/22/24  1412 06/09/23  1141 02/22/21  1123 01/13/20  1009   CHOL 162 163 167.4 156.3   HDL 43* 46* 48.3 48.1   LDL 72 80 90 82   TRIG 237* 186* 146.0 130.4   CHOLHDLRATIO  --   --  3.5 3.3        BP Readings from Last 3 Encounters:   05/22/24 132/81   04/03/24 122/81   12/12/23 (!) 142/81       Current Outpatient Medications   Medication Sig Dispense Refill    artificial tears OINT ophthalmic ointment At Bedtime      Calcium 600-5 MG-MCG TABS Take 1 Tablespoonful by mouth 2 times daily Take 2 tablet daily 180 tablet 3    Calcium Carb-Cholecalciferol 600-5 MG-MCG TABS TAKE 1 TABLET BY MOUTH 2 TIMES DAILY 180 tablet 3    fluticasone (FLONASE) 50 MCG/ACT nasal spray Spray 1 spray into both nostrils daily 15.8 mL 11    latanoprost (XALATAN) 0.005 % ophthalmic solution INSTILL ONE DROP INTO BOTH EYES EVERY EVENING 10 mL 4    lisinopril (ZESTRIL) 20 MG tablet TAKE 1 TABLET BY MOUTH EVERY DAY 90 tablet 3    multivitamin (DEKAS ESSENTIAL) capsule Take 1 capsule by mouth daily Take 1 tablet daily 90 capsule 0    multivitamin  "w/minerals (CERTAVITE/ANTIOXIDANTS) tablet TAKE 1 TABLET BY MOUTH DAILY 90 tablet 3    rosuvastatin (CRESTOR) 10 MG tablet Take 1 tablet (10 mg) by mouth daily 90 tablet 3    triamcinolone (KENALOG) 0.5 % external ointment Apply twice a day as needed up to 8 times per month. 30 g 4    VITAMIN D3 25 MCG (1000 UT) tablet TAKE 1 TABLET BY MOUTH EVERY  tablet 3     No current facility-administered medications for this visit.         PMHX/PSHX/MEDS/ALLERGIES/SHX/FHX reviewed and updated in Epic.   General: No fevers, chills   Head: No headache   CV: No chest pain or palpitations.   Resp: No shortness of breath. No cough. No hemoptysis.   GI: No nausea or vomiting.  She sees occasional flecks of blood on the toilet paper when she wipes after having a bowel movement.  She had a screening colonoscopy 9 years ago and is due for another one in 2025.  Objective: /80   Pulse 83   Temp 97.5  F (36.4  C) (Oral)   Resp 16   Ht 1.537 m (5' 0.5\")   Wt 61.7 kg (136 lb)   SpO2 98%   BMI 26.12 kg/m     No LMP recorded. Patient is postmenopausal.   Gen: Well nourished and in NAD   CV: RRR - no murmurs, rubs, or gallups  Pulm: Clear to auscultation without wheezing or crackles  Extrem: no cyanosis, edema or clubbing   Psych: Euthymic     Signed Electronically by: Walter He MD    "

## 2024-10-22 ENCOUNTER — OFFICE VISIT (OUTPATIENT)
Dept: FAMILY MEDICINE | Facility: CLINIC | Age: 69
End: 2024-10-22
Payer: COMMERCIAL

## 2024-10-22 ENCOUNTER — ANCILLARY PROCEDURE (OUTPATIENT)
Dept: GENERAL RADIOLOGY | Facility: CLINIC | Age: 69
End: 2024-10-22
Attending: FAMILY MEDICINE
Payer: COMMERCIAL

## 2024-10-22 VITALS
OXYGEN SATURATION: 98 % | SYSTOLIC BLOOD PRESSURE: 128 MMHG | HEIGHT: 61 IN | TEMPERATURE: 97.5 F | WEIGHT: 136 LBS | BODY MASS INDEX: 25.68 KG/M2 | HEART RATE: 83 BPM | RESPIRATION RATE: 16 BRPM | DIASTOLIC BLOOD PRESSURE: 80 MMHG

## 2024-10-22 DIAGNOSIS — M17.0 PRIMARY OSTEOARTHRITIS OF BOTH KNEES: ICD-10-CM

## 2024-10-22 DIAGNOSIS — I10 BENIGN ESSENTIAL HYPERTENSION: Chronic | ICD-10-CM

## 2024-10-22 DIAGNOSIS — M17.0 PRIMARY OSTEOARTHRITIS OF BOTH KNEES: Primary | ICD-10-CM

## 2024-10-22 DIAGNOSIS — R73.03 PRE-DIABETES: ICD-10-CM

## 2024-10-22 LAB
ANION GAP SERPL CALCULATED.3IONS-SCNC: 12 MMOL/L (ref 7–15)
BUN SERPL-MCNC: 9.9 MG/DL (ref 8–23)
CALCIUM SERPL-MCNC: 10.1 MG/DL (ref 8.8–10.4)
CHLORIDE SERPL-SCNC: 105 MMOL/L (ref 98–107)
CREAT SERPL-MCNC: 0.9 MG/DL (ref 0.51–0.95)
EGFRCR SERPLBLD CKD-EPI 2021: 68 ML/MIN/1.73M2
EST. AVERAGE GLUCOSE BLD GHB EST-MCNC: 117 MG/DL
GLUCOSE SERPL-MCNC: 106 MG/DL (ref 70–99)
HBA1C MFR BLD: 5.7 % (ref 0–5.6)
HCO3 SERPL-SCNC: 27 MMOL/L (ref 22–29)
POTASSIUM SERPL-SCNC: 4.5 MMOL/L (ref 3.4–5.3)
SODIUM SERPL-SCNC: 144 MMOL/L (ref 135–145)

## 2024-10-22 PROCEDURE — 99214 OFFICE O/P EST MOD 30 MIN: CPT | Mod: 25 | Performed by: FAMILY MEDICINE

## 2024-10-22 PROCEDURE — 83036 HEMOGLOBIN GLYCOSYLATED A1C: CPT | Performed by: FAMILY MEDICINE

## 2024-10-22 PROCEDURE — 80048 BASIC METABOLIC PNL TOTAL CA: CPT | Performed by: FAMILY MEDICINE

## 2024-10-22 PROCEDURE — 90480 ADMN SARSCOV2 VAC 1/ONLY CMP: CPT | Performed by: FAMILY MEDICINE

## 2024-10-22 PROCEDURE — 91320 SARSCV2 VAC 30MCG TRS-SUC IM: CPT | Performed by: FAMILY MEDICINE

## 2024-10-22 PROCEDURE — 36415 COLL VENOUS BLD VENIPUNCTURE: CPT | Performed by: FAMILY MEDICINE

## 2024-10-22 PROCEDURE — 73560 X-RAY EXAM OF KNEE 1 OR 2: CPT | Mod: TC | Performed by: STUDENT IN AN ORGANIZED HEALTH CARE EDUCATION/TRAINING PROGRAM

## 2024-10-28 NOTE — PROGRESS NOTES
PHYSICAL THERAPY EVALUATION  Type of Visit: Evaluation       Fall Risk Screen:  Fall screen completed by: PT  Have you fallen 2 or more times in the past year?: No  Have you fallen and had an injury in the past year?: No  Is patient a fall risk?: No    Subjective       Presenting condition or subjective complaint: (Patient-Rptd) knee pain  Date of onset: 10/22/24 (order date due to chronicity of problem)    Relevant medical history:     Dates & types of surgery: (Patient-Rptd) none    Prior diagnostic imaging/testing results: (Patient-Rptd) X-ray     Prior therapy history for the same diagnosis, illness or injury: (Patient-Rptd) No      Prior Level of Function  Transfers:   Ambulation:   ADL:   IADL:     Living Environment  Social support: (Patient-Rptd) With a significant other or spouse   Type of home: (Patient-Rptd) Apartment/condo   Stairs to enter the home: (Patient-Rptd) Yes       Ramp: (Patient-Rptd) No   Stairs inside the home: (Patient-Rptd) No       Help at home: (Patient-Rptd) None  Equipment owned:       Employment: (Patient-Rptd) No    Hobbies/Interests: (Patient-Rptd) watching TV    Patient goals for therapy: (Patient-Rptd) do housework    Pain assessment: See objective evaluation for additional pain details     Objective   KNEE EVALUATION  PAIN: Pain Level at Rest: 2/10  Pain Level with Use: 10/10  Pain Location: paoc knees  Pain Quality: Sharp and Stabbing  Pain is Exacerbated By: household chores, walking, standing, bending, stairs, squatting  Pain is Relieved By: rest and salonpaus  Pain Progression: worsened recently  INTEGUMENTARY (edema, incisions):  knee circumference: R 37.5 cm, L 36.5 cm  POSTURE:   GAIT:  Weightbearing Status: WBAT  Assistive Device(s): None  Gait Deviations:   BALANCE/PROPRIOCEPTION:   WEIGHTBEARING ALIGNMENT:   NON-WEIGHTBEARING ALIGNMENT:   ROM:   (Degrees) Left AROM Left PROM  Right AROM Right PROM   Knee Flexion 135 deg  130 deg     Knee Extension Mild restriction  Mild  restriction    Pain:   End feel:     STRENGTH:   Pain: - none + mild ++ moderate +++ severe  Strength Scale: 0-5/5 Left Right   Knee Flexion     Knee Extension 4+ 4   Quad Set       MMT Left Right   Hip flexion 4+/5 4+/5   Hip ER 4+/5 4/5   Hip IR /5 /5   Hip extension /5 /5   Hip Abd /5 /5   Hip Add /5 /5       FLEXIBILITY:   SPECIAL TESTS:    Left Right   Apley's (Meniscus)     Naheed's (Meniscus)     Cristine's (ITB/TFL)     Patellar Apprehension Test Positive Positive   Patella Tracking     Ligamentous Stability     Anterior Drawer (ACL)     Posterior Drawer (PCL)     Prone Dial Test at 30 Deg and 90 Deg (PCL/PLC)     Valgus Stress Testing at 0 Deg and 30 Deg     Varus Stress Testing at 0 Deg and 30 Deg        FUNCTIONAL TESTS:   PALPATION:   + Tenderness At Location Left Right   Medial Joint Line - -   Lateral Joint Line - -   Patellar Tendon - -   IT Band - -   Incisional     Popliteal     Biceps Femoris     Semitendinosis     Semimembranosis     Glut Medius     Patellar Medial     Patellar Lateral     Patellar Superior     Patellar Inferior      Tender at paco distal medial quadriceps  JOINT MOBILITY:     Assessment & Plan   CLINICAL IMPRESSIONS  Medical Diagnosis: Primary osteoarthritis of both knees    Treatment Diagnosis: Primary osteoarthritis of both knees, patellofemoral joint pain paco   Impression/Assessment: Patient is a 69 year old female with paco knee OA and patellofemoral joint pain complaints.  The following significant findings have been identified: Pain, Decreased ROM/flexibility, Decreased joint mobility, Decreased strength, Impaired balance, Edema, Impaired gait, Impaired muscle performance, and Decreased activity tolerance. These impairments interfere with their ability to perform self care tasks, recreational activities, household chores, household mobility, and community mobility as compared to previous level of function.     Clinical Decision Making (Complexity):  Clinical Presentation:  Stable/Uncomplicated  Clinical Presentation Rationale: based on medical and personal factors listed in PT evaluation  Clinical Decision Making (Complexity): Low complexity    PLAN OF CARE  Treatment Interventions:  Modalities: Cryotherapy, E-stim, Hot Pack, Ultrasound  Interventions: Gait Training, Manual Therapy, Neuromuscular Re-education, Therapeutic Activity, Therapeutic Exercise, Self-Care/Home Management    Long Term Goals     PT Goal 1  Goal Identifier: Walking  Goal Description: Pt will be able to walk for 30+ min with <3/10 pain for aerobic endurance  Target Date: 01/21/25  PT Goal 2  Goal Identifier: Stairs  Goal Description: Pt will be able to ascend/descend stairs with reciprocal gait pattern with <3/10 pain to improve functional mobility at home  Target Date: 01/21/25  PT Goal 3  Goal Identifier: Household chores  Goal Description: pt will be able to complete 30+ minutes of household chores with <3/10 pain  Target Date: 01/21/25      Frequency of Treatment: 1x/week  Duration of Treatment: 12 weeks    Recommended Referrals to Other Professionals:   Education Assessment:   Learner/Method: Patient;Pictures/Video    Risks and benefits of evaluation/treatment have been explained.   Patient/Family/caregiver agrees with Plan of Care.     Evaluation Time:     PT Eval, Low Complexity Minutes (46672): 20       Signing Clinician: Shahla Almaraz, PT        [facility]                                                                                   OUTPATIENT PHYSICAL THERAPY      PLAN OF TREATMENT FOR OUTPATIENT REHABILITATION   Patient's Last Name, First Name, Razia Tripp YOB: 1955   Provider's Name   [facility]   Medical Record No.  [id number]     Onset Date: 10/22/24 (order date due to chronicity of problem)  Start of Care Date: 10/29/24     Medical Diagnosis:  Primary osteoarthritis of both knees      PT Treatment Diagnosis:   Primary osteoarthritis of both knees, patellofemoral joint pain paco Plan of Treatment  Frequency/Duration: 1x/week/ 12 weeks    Certification date from 10/29/24 to 01/21/25         See note for plan of treatment details and functional goals     Shahla Almaraz, PT                         I CERTIFY THE NEED FOR THESE SERVICES FURNISHED UNDER        THIS PLAN OF TREATMENT AND WHILE UNDER MY CARE     (Physician attestation of this document indicates review and certification of the therapy plan).              Referring Provider:  Walter He    Initial Assessment  See Epic Evaluation- Start of Care Date: 10/29/24

## 2024-10-29 ENCOUNTER — THERAPY VISIT (OUTPATIENT)
Dept: PHYSICAL THERAPY | Facility: REHABILITATION | Age: 69
End: 2024-10-29
Attending: FAMILY MEDICINE
Payer: COMMERCIAL

## 2024-10-29 DIAGNOSIS — M17.0 PRIMARY OSTEOARTHRITIS OF BOTH KNEES: ICD-10-CM

## 2024-10-29 DIAGNOSIS — M25.561 PATELLOFEMORAL JOINT PAIN, RIGHT: ICD-10-CM

## 2024-10-29 DIAGNOSIS — M25.562 PAIN OF LEFT PATELLOFEMORAL JOINT: Primary | ICD-10-CM

## 2024-10-29 PROCEDURE — 97161 PT EVAL LOW COMPLEX 20 MIN: CPT | Mod: GP | Performed by: PHYSICAL THERAPIST

## 2024-10-29 PROCEDURE — 97110 THERAPEUTIC EXERCISES: CPT | Mod: GP | Performed by: PHYSICAL THERAPIST

## 2024-10-29 ASSESSMENT — ACTIVITIES OF DAILY LIVING (ADL)
HOW_WOULD_YOU_RATE_THE_OVERALL_FUNCTION_OF_YOUR_KNEE_DURING_YOUR_USUAL_DAILY_ACTIVITIES?: ABNORMAL
WALK: ACTIVITY IS VERY DIFFICULT
HOW_WOULD_YOU_RATE_THE_OVERALL_FUNCTION_OF_YOUR_KNEE_DURING_YOUR_USUAL_DAILY_ACTIVITIES?: ABNORMAL
GO UP STAIRS: ACTIVITY IS VERY DIFFICULT
SWELLING: THE SYMPTOM AFFECTS MY ACTIVITY MODERATELY
SWELLING: THE SYMPTOM AFFECTS MY ACTIVITY MODERATELY
AS_A_RESULT_OF_YOUR_KNEE_INJURY,_HOW_WOULD_YOU_RATE_YOUR_CURRENT_LEVEL_OF_DAILY_ACTIVITY?: ABNORMAL
GO DOWN STAIRS: ACTIVITY IS VERY DIFFICULT
KNEEL ON THE FRONT OF YOUR KNEE: I AM UNABLE TO DO THE ACTIVITY
PLEASE_INDICATE_YOR_PRIMARY_REASON_FOR_REFERRAL_TO_THERAPY:: KNEE
PAIN: THE SYMPTOM AFFECTS MY ACTIVITY SEVERELY
SIT WITH YOUR KNEE BENT: ACTIVITY IS MINIMALLY DIFFICULT
RISE FROM A CHAIR: ACTIVITY IS SOMEWHAT DIFFICULT
LIMPING: THE SYMPTOM AFFECTS MY ACTIVITY SLIGHTLY
SIT WITH YOUR KNEE BENT: ACTIVITY IS MINIMALLY DIFFICULT
HOW_WOULD_YOU_RATE_THE_CURRENT_FUNCTION_OF_YOUR_KNEE_DURING_YOUR_USUAL_DAILY_ACTIVITIES_ON_A_SCALE_FROM_0_TO_100_WITH_100_BEING_YOUR_LEVEL_OF_KNEE_FUNCTION_PRIOR_TO_YOUR_INJURY_AND_0_BEING_THE_INABILITY_TO_PERFORM_ANY_OF_YOUR_USUAL_DAILY_ACTIVITIES?: 30
STIFFNESS: THE SYMPTOM AFFECTS MY ACTIVITY MODERATELY
WEAKNESS: THE SYMPTOM AFFECTS MY ACTIVITY MODERATELY
WEAKNESS: THE SYMPTOM AFFECTS MY ACTIVITY MODERATELY
KNEEL ON THE FRONT OF YOUR KNEE: I AM UNABLE TO DO THE ACTIVITY
GO UP STAIRS: ACTIVITY IS VERY DIFFICULT
PAIN: THE SYMPTOM AFFECTS MY ACTIVITY SEVERELY
WALK: ACTIVITY IS VERY DIFFICULT
LIMPING: THE SYMPTOM AFFECTS MY ACTIVITY SLIGHTLY
STAND: ACTIVITY IS SOMEWHAT DIFFICULT
GIVING WAY, BUCKLING OR SHIFTING OF KNEE: I DO NOT HAVE THE SYMPTOM
RAW_SCORE: 30
GIVING WAY, BUCKLING OR SHIFTING OF KNEE: I DO NOT HAVE THE SYMPTOM
SQUAT: ACTIVITY IS FAIRLY DIFFICULT
RISE FROM A CHAIR: ACTIVITY IS SOMEWHAT DIFFICULT
KNEE_ACTIVITY_OF_DAILY_LIVING_SUM: 30
HOW_WOULD_YOU_RATE_THE_CURRENT_FUNCTION_OF_YOUR_KNEE_DURING_YOUR_USUAL_DAILY_ACTIVITIES_ON_A_SCALE_FROM_0_TO_100_WITH_100_BEING_YOUR_LEVEL_OF_KNEE_FUNCTION_PRIOR_TO_YOUR_INJURY_AND_0_BEING_THE_INABILITY_TO_PERFORM_ANY_OF_YOUR_USUAL_DAILY_ACTIVITIES?: 30
AS_A_RESULT_OF_YOUR_KNEE_INJURY,_HOW_WOULD_YOU_RATE_YOUR_CURRENT_LEVEL_OF_DAILY_ACTIVITY?: ABNORMAL
SQUAT: ACTIVITY IS FAIRLY DIFFICULT
STAND: ACTIVITY IS SOMEWHAT DIFFICULT
STIFFNESS: THE SYMPTOM AFFECTS MY ACTIVITY MODERATELY
GO DOWN STAIRS: ACTIVITY IS VERY DIFFICULT
KNEE_ACTIVITY_OF_DAILY_LIVING_SCORE: 42.86

## 2024-11-19 ENCOUNTER — OFFICE VISIT (OUTPATIENT)
Dept: FAMILY MEDICINE | Facility: CLINIC | Age: 69
End: 2024-11-19
Payer: COMMERCIAL

## 2024-11-19 VITALS
HEART RATE: 78 BPM | BODY MASS INDEX: 25.55 KG/M2 | OXYGEN SATURATION: 99 % | DIASTOLIC BLOOD PRESSURE: 89 MMHG | RESPIRATION RATE: 16 BRPM | SYSTOLIC BLOOD PRESSURE: 138 MMHG | WEIGHT: 133 LBS

## 2024-11-19 DIAGNOSIS — R41.3 MEMORY LOSS: Primary | ICD-10-CM

## 2024-11-19 DIAGNOSIS — G47.00 INSOMNIA, UNSPECIFIED TYPE: ICD-10-CM

## 2024-11-19 LAB
ALBUMIN SERPL BCG-MCNC: 4.7 G/DL (ref 3.5–5.2)
ALP SERPL-CCNC: 77 U/L (ref 40–150)
ALT SERPL W P-5'-P-CCNC: 25 U/L (ref 0–50)
ANION GAP SERPL CALCULATED.3IONS-SCNC: 10 MMOL/L (ref 7–15)
AST SERPL W P-5'-P-CCNC: 30 U/L (ref 0–45)
BASOPHILS # BLD AUTO: 0 10E3/UL (ref 0–0.2)
BASOPHILS NFR BLD AUTO: 0 %
BILIRUB SERPL-MCNC: 0.6 MG/DL
BUN SERPL-MCNC: 13.5 MG/DL (ref 8–23)
CALCIUM SERPL-MCNC: 10 MG/DL (ref 8.8–10.4)
CHLORIDE SERPL-SCNC: 103 MMOL/L (ref 98–107)
CREAT SERPL-MCNC: 0.86 MG/DL (ref 0.51–0.95)
EGFRCR SERPLBLD CKD-EPI 2021: 72 ML/MIN/1.73M2
EOSINOPHIL # BLD AUTO: 0.1 10E3/UL (ref 0–0.7)
EOSINOPHIL NFR BLD AUTO: 1 %
ERYTHROCYTE [DISTWIDTH] IN BLOOD BY AUTOMATED COUNT: 12.2 % (ref 10–15)
FOLATE SERPL-MCNC: 34.8 NG/ML (ref 4.6–34.8)
GLUCOSE SERPL-MCNC: 103 MG/DL (ref 70–99)
HCO3 SERPL-SCNC: 28 MMOL/L (ref 22–29)
HCT VFR BLD AUTO: 40.8 % (ref 35–47)
HGB BLD-MCNC: 13.3 G/DL (ref 11.7–15.7)
IMM GRANULOCYTES # BLD: 0 10E3/UL
IMM GRANULOCYTES NFR BLD: 0 %
LYMPHOCYTES # BLD AUTO: 1.9 10E3/UL (ref 0.8–5.3)
LYMPHOCYTES NFR BLD AUTO: 40 %
MCH RBC QN AUTO: 29.2 PG (ref 26.5–33)
MCHC RBC AUTO-ENTMCNC: 32.6 G/DL (ref 31.5–36.5)
MCV RBC AUTO: 90 FL (ref 78–100)
MONOCYTES # BLD AUTO: 0.4 10E3/UL (ref 0–1.3)
MONOCYTES NFR BLD AUTO: 8 %
NEUTROPHILS # BLD AUTO: 2.4 10E3/UL (ref 1.6–8.3)
NEUTROPHILS NFR BLD AUTO: 50 %
PLATELET # BLD AUTO: 213 10E3/UL (ref 150–450)
POTASSIUM SERPL-SCNC: 4.2 MMOL/L (ref 3.4–5.3)
PROT SERPL-MCNC: 8.1 G/DL (ref 6.4–8.3)
RBC # BLD AUTO: 4.56 10E6/UL (ref 3.8–5.2)
SODIUM SERPL-SCNC: 141 MMOL/L (ref 135–145)
TSH SERPL DL<=0.005 MIU/L-ACNC: 1.61 UIU/ML (ref 0.3–4.2)
VIT B12 SERPL-MCNC: 1411 PG/ML (ref 232–1245)
WBC # BLD AUTO: 4.7 10E3/UL (ref 4–11)

## 2024-11-19 SDOH — HEALTH STABILITY: PHYSICAL HEALTH: ON AVERAGE, HOW MANY MINUTES DO YOU ENGAGE IN EXERCISE AT THIS LEVEL?: 0 MIN

## 2024-11-19 SDOH — HEALTH STABILITY: PHYSICAL HEALTH: ON AVERAGE, HOW MANY DAYS PER WEEK DO YOU ENGAGE IN MODERATE TO STRENUOUS EXERCISE (LIKE A BRISK WALK)?: 0 DAYS

## 2024-11-19 ASSESSMENT — SOCIAL DETERMINANTS OF HEALTH (SDOH): HOW OFTEN DO YOU GET TOGETHER WITH FRIENDS OR RELATIVES?: MORE THAN THREE TIMES A WEEK

## 2024-11-19 NOTE — PATIENT INSTRUCTIONS
Try some melatonin every night 30-60 minutes before you lie down.    RESOURCES FOR OLDER ADULTS AND THEIR FAMILIES  [here are some resources that may apply to you now or in the future]      Senior LinkAge Line (418-329-4262) -- Free telephone consultation to learn about services for older adults and family caregivers including housing options, transportation (including [employer]), homemaker, outdoor chore, meals, grocery delivery, friendly visiting, telephone reassurance, home modification, respite and caregiver consultation.  https://[place].gov/senior-linkage-line/    For veterans -- LinkReduxt Support (1-505.343.4888).  Answers on all -related questions including healthcare benefits for Veterans and their spouses. https://ImaginAb.org/    For persons with disabilities -- Disability Hub [place].  Free [facility] resource network that helps people with lifelong or acquired disabilities solve problems, navigate the system and plan for the future.  https://disabilityhubmn.org/     Advance Care Planning / Living Méndez  If you have not done so, you are encouraged to complete advance directives and/or a living will.   More information about advance directives can be found through:    The [place] [employer].  https://www.[place]med.org/advocacy/Key-Issues/Advance-Directives     Honoring [facility].  https://www.honoringchoices.org/ OR call Open legal decision maker information at 675-181-1509    The Senior LinkAge Line (901-705-9985) can provide assistance with completion of advance care planning documents.  https://[place]senior-linkage-line/    Managing your Medication   Review your medications attached at the end of this document.  Note any changes made by your care team in today's encounter.    Let your provider know if you start taking any new prescription, over-the-counter (OTC), supplements, or herbal medications    Unwanted Medications- Find a location to safely take your unwanted  medications https://www.pca.state.[place]/living-green/managing-unwanted-medications    Support for Persons and Caregivers Concerned About Memory or Dementia  Alzheimer's Association -- 24/7 Hotline (1-168.336.4908).  https://www.alz.org/    Alzheimer's Disease Education and Referral Center (1-533.142.8456). https://www.[place].nih.gov/health/ifkpx-woysa-hsbuao    Family Caregiver [place] (1-370.938.7359). https://www.caregiver.org    Safety / Emergencies  Falls -- when someone falls, they should call 911 if they have suffered a serious injury. 911 will also provide a lift assist to get the person off the floor.     Fall prevention classes - can be taken through [employer] .  Classes are no or low-cost and some insurance plans cover the fee.  [facility] also offers Live Well and Get Fit classes.  To enroll in a class call (toll free) [phone] or register online at [email or web address]    Personal Emergency Response Systems (PERS) - Senior LinkAge Line (087-187-6782) can provide options specific to your location.  https://[place].gov[place]senior-linkage-line/    National Suicide Prevention Hotline: 1-216.382.7548 or text [place] to 790477.

## 2024-11-19 NOTE — PROGRESS NOTES
Preventive Care Visit  [facility]  Walter He MD, Family Medicine  Nov 19, 2024        Diagnosis or treatment significantly limited by social determinants of health - low health literacy,   30 minutes spent by me on the date of the encounter doing chart review, patient visit, documentation.  Assessment & Plan  Memory loss  At this point I suspect this is mostly related to her poor sleep.  She has a normal 60 seconds animal naming and normal mini cog.  If she feels that she is worsening we can send her for neuropsych testing.  Orders:    Comprehensive metabolic panel    Vitamin B12    Folate    TSH    CBC with Platelets & Differential    Insomnia, unspecified type    Orders:    melatonin 5 MG tablet; Take 1 tablet (5 mg) by mouth nightly as needed for sleep.         Patient Instructions   Try some melatonin every night 30-60 minutes before you lie down.    RESOURCES FOR OLDER ADULTS AND THEIR FAMILIES  [here are some resources that may apply to you now or in the future]      Senior LinkAge Line (181-418-1448) -- Free telephone consultation to learn about services for older adults and family caregivers including housing options, transportation (including [employer]), homemaker, outdoor chore, meals, grocery delivery, friendly visiting, telephone reassurance, home modification, respite and caregiver consultation.  https://[place].gov[place]senior-linkage-line/    For veterans -- Link[employer] Support (1-267.513.8618).  Answers on all -related questions including healthcare benefits for Veterans and their spouses. https://Mim[employer] Systemst.org/    For persons with disabilities -- Disability Hub [place].  Free statewide resource network that helps people with lifelong or acquired disabilities solve problems, navigate the system and plan for the future.  https://disabilityhubmn.org/     Advance Care Planning / Living Méndez  If you have not done so, you are encouraged to complete advance directives  and/or a living will.   More information about advance directives can be found through:    The [place] Medical Association.  https://www.mnmed.org/advocacy/Key-Issues/Advance-Directives     Honoring Choices [place].  https://www.honoringchoices.org/ OR call Open legal decision maker information at 621-294-8937    The Senior LinkAge Line (485-465-0508) can provide assistance with completion of advance care planning documents.  https://[place].gov/BNY Mellonlinkage-line/    Managing your Medication   Review your medications attached at the end of this document.  Note any changes made by your care team in today's encounter.    Let your provider know if you start taking any new prescription, over-the-counter (OTC), supplements, or herbal medications    Unwanted Medications- Find a location to safely take your unwanted medications https://www.pca.[facility].[place]./living-green/managing-unwanted-medications    Support for Persons and Caregivers Concerned About Memory or Dementia  Alzheimer's Association -- 24/7 Hotline (1-952.568.3358).  https://www.alz.org/    Alzheimer's Disease Education and Referral Center (1-928.652.4712). https://www.milton.nih.gov/health/psbsp-loeyq-lzcbce    Family Caregiver [place] (1-704.292.1823). https://www.caregiver.org    Safety / Emergencies  Falls -- when someone falls, they should call 911 if they have suffered a serious injury. 911 will also provide a lift assist to get the person off the floor.     Fall prevention classes - can be taken through [employer] .  Classes are no or low-cost and some insurance plans cover the fee.  [facility] also offers Live Well and Get Fit classes.  To enroll in a class call (toll free) [phone] or register online at [email or web address]    Personal Emergency Response Systems (PERS) - Senior LinkAge Line (527-571-6006) can provide options specific to your location.  https://[place].gov/VaultLogix-linkage-line/    National Suicide Prevention Hotline: 1-340.724.2520 or text [place]  to 815924.        Subjective   Razia is a 69 year old, presenting for the following:  Wellness Visit and Medicare Visit        11/19/2024     8:11 AM   Additional Questions   Roomed by Jesus   Accompanied by self         11/19/2024    Information    services provided? Yes   Language Estonian   Type of interpretation provided Face-to-face    name Thiago    ID [id number]    Agency [employer]              [facility]          Health Care Directive  Patient does not have a Health Care Directive: Advance Directive received and scanned. Click on Code in the patient header to view.      11/19/2024   General Health   How would you rate your overall physical health? (!) FAIR   Feel stress (tense, anxious, or unable to sleep) To some extent      (!) STRESS CONCERN      11/19/2024   Nutrition   Diet: Regular (no restrictions)            11/19/2024   Exercise   Days per week of moderate/strenous exercise 0 days   Average minutes spent exercising at this level 0 min      (!) EXERCISE CONCERN      11/19/2024   Social Factors   Frequency of gathering with friends or relatives More than three times a week   Worry food won't last until get money to buy more No   Food not last or not have enough money for food? No   Do you have housing? (Housing is defined as stable permanent housing and does not include staying ouside in a car, in a tent, in an abandoned building, in an overnight shelter, or couch-surfing.) Yes   Are you worried about losing your housing? No   Lack of transportation? No   Unable to get utilities (heat,electricity)? No            11/19/2024   Fall Risk   Fallen 2 or more times in the past year? No    Trouble with walking or balance? Yes        Patient-reported           11/19/2024   Activities of Daily Living- Home Safety   Needs help with the following daily activites None of the above   Safety concerns in the home None of the above            11/19/2024   Dental   Dentist two  times every year? Yes            11/19/2024   Hearing Screening   Hearing concerns? None of the above            11/19/2024   Driving Risk Screening   Patient/family members have concerns about driving No            11/19/2024   General Alertness/Fatigue Screening   Have you been more tired than usual lately? (!) YES            11/19/2024   Urinary Incontinence Screening   Bothered by leaking urine in past 6 months No            11/19/2024   TB Screening   Were you born outside of the [place]? Yes            Today's PHQ-2 Score:       11/19/2024     8:19 AM   PHQ-2 ( 1999 Pfizer)   Q1: Little interest or pleasure in doing things 1    Q2: Feeling down, depressed or hopeless 1    PHQ-2 Score 2    Q1: Little interest or pleasure in doing things Several days   Q2: Feeling down, depressed or hopeless Several days   PHQ-2 Score 2       Patient-reported           11/19/2024   Substance Use   Alcohol more than 3/day or more than 7/wk Not Applicable   Do you have a current opioid prescription? No   How severe/bad is pain from 1 to 10? 2/10   Do you use any other substances recreationally? No        Social History     Tobacco Use    Smoking status: Never    Smokeless tobacco: Never   Vaping Use    Vaping status: Never Used   Substance Use Topics    Alcohol use: No    Drug use: No           7/10/2023   LAST FHS-7 RESULTS   1st degree relative breast or ovarian cancer No   Any relative bilateral breast cancer No   Any male have breast cancer No   Any ONE woman have BOTH breast AND ovarian cancer No   Any woman with breast cancer before 50yrs No   2 or more relatives with breast AND/OR ovarian cancer No   2 or more relatives with breast AND/OR bowel cancer No                 History of abnormal Pap smear:         Latest Ref Rng & Units 11/9/2018    10:22 AM 7/9/2015    11:05 AM 6/26/2014    10:40 AM   PAP / HPV   PAP   Negative for squamous intraepithelial lesion or malignancy  Electronically signed by Isela Strange CT (ASCP) on  7/16/2015 at  3:55 PM    Negative for squamous intraepithelial lesion or malignancy  Electronically signed by Aleena Barrera CT (ASCP) on 7/14/2014 at 12:12 PM      HPV 16 DNA NEG Negative      HPV 18 DNA NEG Negative      Other HR HPV NEG Negative        ASCVD Risk   The 10-year ASCVD risk score (Vesna MONCADA, et al., 2019) is: 14.3%    Values used to calculate the score:      Age: 69 years      Sex: Female      Is Non- : No      Diabetic: No      Tobacco smoker: No      Systolic Blood Pressure: 138 mmHg      Is BP treated: Yes      HDL Cholesterol: 43 mg/dL      Total Cholesterol: 162 mg/dL            Reviewed and updated as needed this visit by Provider                      Current providers sharing in care for this patient include:  Patient Care Team:  Walter He MD as PCP - General (Family Medicine)  Walter He MD as Assigned PCP    The following health maintenance items are reviewed in Epic and correct as of today:  Health Maintenance   Topic Date Due    MEDICARE ANNUAL WELLNESS VISIT  06/09/2024    COLORECTAL CANCER SCREENING  05/11/2025    LIPID  05/22/2025    DEXA  07/10/2025    MAMMO SCREENING  07/10/2025    BMP  10/22/2025    FALL RISK ASSESSMENT  11/19/2025    GLUCOSE  10/22/2027    ADVANCE CARE PLANNING  07/03/2028    DTAP/TDAP/TD IMMUNIZATION (3 - Td or Tdap) 05/10/2032    HEPATITIS C SCREENING  Completed    PHQ-2 (once per calendar year)  Completed    INFLUENZA VACCINE  Completed    Pneumococcal Vaccine: 65+ Years  Completed    ZOSTER IMMUNIZATION  Completed    RSV VACCINE  Completed    COVID-19 Vaccine  Completed    HPV IMMUNIZATION  Aged Out    MENINGITIS IMMUNIZATION  Aged Out    RSV MONOCLONAL ANTIBODY  Aged Out            Objective    Exam  /89 (BP Location: Right arm, Patient Position: Sitting, Cuff Size: Adult Regular)   Pulse 78   Resp 16   Wt 60.3 kg (133 lb)   SpO2 99%   BMI 25.55 kg/m     Estimated body mass index is 25.55 kg/m  as  "calculated from the following:    Height as of 10/22/24: 1.537 m (5' 0.5\").    Weight as of this encounter: 60.3 kg (133 lb).    Physical Exam          11/19/2024   Mini Cog   Clock Draw Score 2 Normal   3 Item Recall 3 objects recalled   Mini Cog Total Score 5                 Signed Electronically by: Walter He MD    "

## 2024-12-01 DIAGNOSIS — R73.03 PREDIABETES: ICD-10-CM

## 2024-12-02 RX ORDER — FOLIC ACID/MV,IRON,MIN/LUTEIN 0.4-18-25
1 TABLET ORAL DAILY
Qty: 90 TABLET | Refills: 3 | Status: SHIPPED | OUTPATIENT
Start: 2024-12-02

## 2024-12-02 NOTE — TELEPHONE ENCOUNTER
Name from pharmacy: CERTAVITE-ANTIOXIDANT TABLET          Will file in chart as: CERTAVITE/ANTIOXIDANTS tablet    Sig: TAKE 1 TABLET BY MOUTH EVERY DAY    Disp: 90 tablet    Refills: 3    Start: 12/1/2024    Class: E-Prescribe    Non-formulary For: Prediabetes    Last ordered: 1 year ago (9/7/2023) by Walter He MD    Last refill: 9/5/2024    Rx #: [id number]    Vitamin Supplements Protocol Jyyfkq1612/01/2024 01:03 AM   Protocol Details Medication indicated for associated diagnosis        AYLIN East, BSN

## 2025-03-11 ENCOUNTER — TELEPHONE (OUTPATIENT)
Dept: FAMILY MEDICINE | Facility: CLINIC | Age: 70
End: 2025-03-11

## 2025-03-11 ENCOUNTER — LAB (OUTPATIENT)
Dept: LAB | Facility: CLINIC | Age: 70
End: 2025-03-11
Payer: COMMERCIAL

## 2025-03-11 DIAGNOSIS — Z11.1 SCREENING EXAMINATION FOR PULMONARY TUBERCULOSIS: Primary | ICD-10-CM

## 2025-03-11 PROCEDURE — 36415 COLL VENOUS BLD VENIPUNCTURE: CPT

## 2025-03-11 PROCEDURE — 86481 TB AG RESPONSE T-CELL SUSP: CPT

## 2025-03-11 NOTE — PROGRESS NOTES
Razia Dos Santos presents for a blood draw TB screening test.    TB Screening questions  Have you had recent contact with a person with active tuberculosis (TB)?  No, continue to next question.  Have you ever been treated for tuberculosis (TB) or latent TB before?  No, continue to next question.  Has a county worker or another healthcare worker (not your employer) told you to come in to be tested for TB?  No, continue to next question.  Have you had a live vaccine (smallpox, flumist, MMR, varicella, oral polio and/or yellow fever) in the last 4 weeks?  No, continued with lab visit/blood draw.    Educated patient about when to expect the lab results via phone/mail/[employer].    Eric Maria CMA

## 2025-03-11 NOTE — TELEPHONE ENCOUNTER
Razia Dos Santos called to schedule an appointment for TB screening.        TB Screening questions  Have you had recent contact with a person with active tuberculosis (TB)?  No, continue to next question.  Have you ever been treated for tuberculosis (TB) or latent TB before?  No, continue to next question.  Has a county worker or another healthcare worker (not your employer) told you to come in to be tested for TB?  No, continue to next question.  Have you had a live vaccine (smallpox, flumist, MMR, varicella, oral polio and/or yellow fever) in the last 4 weeks?  No, lab visit scheduled.       Lab visit scheduled for 03/11/25.    Roxana Bello

## 2025-03-12 LAB
QUANTIFERON MITOGEN: 5.38 IU/ML
QUANTIFERON NIL TUBE: 0.16 IU/ML

## 2025-03-13 DIAGNOSIS — E55.9 VITAMIN D DEFICIENCY: ICD-10-CM

## 2025-03-13 LAB
GAMMA INTERFERON BACKGROUND BLD IA-ACNC: 0.16 IU/ML
M TB IFN-G BLD-IMP: NEGATIVE
M TB IFN-G CD4+ BCKGRND COR BLD-ACNC: 5.22 IU/ML
MITOGEN IGNF BCKGRD COR BLD-ACNC: 0.27 IU/ML
MITOGEN IGNF BCKGRD COR BLD-ACNC: 0.3 IU/ML
QUANTIFERON TB1 TUBE: 0.46 IU/ML
QUANTIFERON TB2 TUBE: 0.43

## 2025-03-13 RX ORDER — CHOLECALCIFEROL (VITAMIN D3) 25 MCG
TABLET ORAL
Qty: 100 TABLET | Refills: 3 | Status: SHIPPED | OUTPATIENT
Start: 2025-03-13

## 2025-03-13 NOTE — TELEPHONE ENCOUNTER
Name from pharmacy: VITAMIN D3 25 MCG TABLET          Will file in chart as: VITAMIN D3 25 MCG (1000 UT) tablet    Sig: TAKE 1 TABLET BY MOUTH EVERY DAY    Disp: 100 tablet    Refills: 3    Start: 3/13/2025    Class: E-Prescribe    Non-formulary For: Vitamin D deficiency    Last ordered: 1 year ago (2/21/2024) by Walter He MD    Last refill: 12/6/2024    Rx #: [id number]    Vitamin Supplements Protocol Splwnq4703/13/2025 01:55 AM   Protocol Details The patient does not have an order for high-dose vitamin D    Medication is active on med list and the sig matches. RN to manually verify dose and sig if red X/fail.    Medication indicated for associated diagnosis    The patient is 2 years of age or older    Recent (12 mo) or future (90 days) visit within the authorizing provider's specialty        Prescription approved per [facility] Refill Protocol.    Jesus Campbell, RN, MSN

## 2025-03-22 DIAGNOSIS — E78.00 PURE HYPERCHOLESTEROLEMIA: ICD-10-CM

## 2025-03-24 RX ORDER — ROSUVASTATIN CALCIUM 10 MG/1
10 TABLET, COATED ORAL DAILY
Qty: 90 TABLET | Refills: 3 | Status: SHIPPED | OUTPATIENT
Start: 2025-03-24

## 2025-03-24 NOTE — TELEPHONE ENCOUNTER
Name from pharmacy: ROSUVASTATIN CALCIUM 10 MG TAB          Will file in chart as: rosuvastatin (CRESTOR) 10 MG tablet    Sig: TAKE 1 TABLET (10 MG) BY MOUTH DAILY.    Disp: 90 tablet    Refills: 3    Start: 3/22/2025    Class: E-Prescribe    Non-formulary For: Pure hypercholesterolemia    Last ordered: 11 months ago (4/3/2024) by Walter He MD    Last refill: 12/20/2024    Rx #: [id number]    Antihyperlipidemic agents Ppskfa9603/22/2025 12:22 AM   Protocol Details LDL on file in the past 12 months    Medication is active on med list and the sig matches. RN to manually verify dose and sig if red X/fail.    Recent (12 mo) or future (90 days) visit within the authorizing provider's specialty    Patient is age 18 years or older    No active pregnancy on record    No positive pregnancy test in past 12 mos        LDL Cholesterol Calculated   Date Value Ref Range Status   05/22/2024 72 <=100 mg/dL Final   02/22/2021 90 0 - 129 mg/dL Final     Prescription approved per [facility] Refill Protocol.  AYLIN East, BSN

## 2025-04-19 ENCOUNTER — HEALTH MAINTENANCE LETTER (OUTPATIENT)
Age: 70
End: 2025-04-19

## 2025-05-16 NOTE — PROGRESS NOTES
"  The longitudinal plan of care for the diagnosis(es)/condition(s) as documented were addressed during this visit. Due to the added complexity in care, I will continue to support her in the subsequent management and with ongoing continuity of care.  Diagnosis or treatment significantly limited by social determinants of health - language barrier  40 minutes spent by me on the date of the encounter doing chart review, patient visit, documentation.  Assessment & Plan  Special screening for malignant neoplasms, colon    Orders:    Colonoscopy Screening  Referral; Future    Screening for cardiovascular condition         Pure hypercholesterolemia    Orders:    Lipid panel reflex to direct LDL Non-fasting; Future    Urinary symptom or sign  Given the \"bubbles\" in the urine we will check for the proteinuria.  Orders:    Urine Macroscopic with reflex to Microscopic; Future    Basic metabolic panel; Future    Benign essential hypertension  Blood pressure is well-controlled.       Lateral epicondylitis of right elbow    Orders:    Wrist/Arm/Hand Bracing Supplies Order Tennis Elbow Arm Band; Right       Assessment & Plan  Thank you for trusting us with your health care. Here are the things we discussed at today's visit.    Special screening for malignant neoplasms, colon:  - Due for colonoscopy; last colonoscopy was 10 years ago.  - Referral to [facility] for colonoscopy.    Screening for cardiovascular condition:  - Blood pressure is good today.  - Check blood work for cholesterol and kidney function.    Pure hypercholesterolemia:  - High cholesterol; safe to stop cholesterol medicine for a month to assess side effects.  - Option to stop cholesterol medicine for a month; follow-up phone visit in a month to evaluate condition and decide on medication adjustment.    Urinary symptom or sign:  - Bubbles in urine possibly due to protein; no pain or change in frequency.  - Urine test for protein.    Lateral " epicondylitis of right elbow:  - Tennis elbow diagnosed; irritation of tendon by elbow.  - Prescription for elbow brace; option to purchase brace from [employer] or [employer]; if brace does not help, referral to Dr. Wes Cisneros for injection.    Labs reviewed:  - Blood test for kidneys  - Urinalysis  - TB test     Patient Instructions   Thank you for trusting us with your care.    We are referring you to the following service:   Gastroenterology -- stomach and colon concerns      Please call the specialty clinic or imaging center to arrange your referral appointment.  Let us know us you have any questions.           Tennis elbow brace    If the brace does not help you can see Dr. Wes Cisneros at our clinic who can inject that spot.       Based on our discussion, I have outlined the following instructions for you:      - It's time to have a colonoscopy since your last one was 10 years ago.  - You will be referred to [facility] to schedule your colonoscopy.  - Get your blood tested to check your cholesterol levels and how well your kidneys are working.  - You can stop taking your cholesterol medicine for one month to see if there are any side effects.  - We will have a phone call in a month to talk about how you're doing and decide if any changes to your medication are needed.  - Have a urine test to check for protein.  - You have a prescription for an elbow brace. You can buy it from [employer] or [employer]. If the brace doesn't help, you can see Dr. Wes Cisneros for an injection.  - Get a blood test to check how your kidneys are doing.  - Have a urine test.  - Get a test for tuberculosis.    Thank you again for your visit, and we look forward to supporting you in your journey to better health.     Angela Randle is a 69 year old, presenting for the following health issues:  No chief complaint on file.        5/20/2025     8:08 AM   Additional Questions   Roomed by mao   Accompanied by self         5/20/2025     "Information    services provided? Yes   Language Slovenian   Type of interpretation provided Face-to-face    name Cody Skinner    Agency [employer]        Last colonoscopy in 2015 - normal, repeat in 2025 if no issues    She had had a concern about memory loss at a previous visit.  She had a normal mini cog and normal 60 seconds animal naming.  I had offered neuropsych testing if she were worsening.       Bubbles in urine  - Reports bubbles in urine for the past 1 to 2 months  - No pain during urination  - Urination frequency remains unchanged    Arm pain  - Experiences aches and pains in the arm, not tingling  - Pain localized to one side  - Right-handed, possibly related to increased use of the arm    Medication side effects  - Suspects side effects from cholesterol medication started approximately 6 months ago    Misc  - Last colonoscopy was 10 years ago  - Does not take melatonin; prefers drinking milk before sleep  - Occasionally uses steroid cream  - Uses nose spray for allergies occasionally  - Takes vitamin D daily  - Uses eye drops for glaucoma      Review of Systems  CONSTITUTIONAL: NEGATIVE for fever, chills, change in weight  ENT/MOUTH: NEGATIVE for ear, mouth and throat problems  RESP: NEGATIVE for significant cough or SOB  CV: NEGATIVE for chest pain, palpitations or peripheral edema      Objective    /78   Pulse 86   Temp 97.8  F (36.6  C) (Oral)   Resp 16   Ht 1.537 m (5' 0.5\")   Wt 64 kg (141 lb)   SpO2 99%   BMI 27.08 kg/m    Body mass index is 27.08 kg/m .  Physical Exam   GENERAL: alert and no distress  NECK: no adenopathy, no asymmetry, masses, or scars  RESP: lungs clear to auscultation - no rales, rhonchi or wheezes  CV: regular rate and rhythm, normal S1 S2, no S3 or S4, no murmur, click or rub, no peripheral edema  MS: Positive provocative test for lateral epicondylitis on the right          Signed Electronically by: Walter He MD    "

## 2025-05-20 ENCOUNTER — OFFICE VISIT (OUTPATIENT)
Dept: FAMILY MEDICINE | Facility: CLINIC | Age: 70
End: 2025-05-20
Payer: COMMERCIAL

## 2025-05-20 ENCOUNTER — RESULTS FOLLOW-UP (OUTPATIENT)
Dept: FAMILY MEDICINE | Facility: CLINIC | Age: 70
End: 2025-05-20

## 2025-05-20 VITALS
TEMPERATURE: 97.8 F | SYSTOLIC BLOOD PRESSURE: 132 MMHG | BODY MASS INDEX: 26.62 KG/M2 | OXYGEN SATURATION: 99 % | HEIGHT: 61 IN | WEIGHT: 141 LBS | HEART RATE: 86 BPM | RESPIRATION RATE: 16 BRPM | DIASTOLIC BLOOD PRESSURE: 78 MMHG

## 2025-05-20 DIAGNOSIS — I10 BENIGN ESSENTIAL HYPERTENSION: Chronic | ICD-10-CM

## 2025-05-20 DIAGNOSIS — R39.9 URINARY SYMPTOM OR SIGN: ICD-10-CM

## 2025-05-20 DIAGNOSIS — Z13.6 SCREENING FOR CARDIOVASCULAR CONDITION: ICD-10-CM

## 2025-05-20 DIAGNOSIS — E78.00 PURE HYPERCHOLESTEROLEMIA: ICD-10-CM

## 2025-05-20 DIAGNOSIS — M77.11 LATERAL EPICONDYLITIS OF RIGHT ELBOW: ICD-10-CM

## 2025-05-20 DIAGNOSIS — Z12.11 SPECIAL SCREENING FOR MALIGNANT NEOPLASMS, COLON: Primary | ICD-10-CM

## 2025-05-20 LAB
ALBUMIN UR-MCNC: NEGATIVE MG/DL
ANION GAP SERPL CALCULATED.3IONS-SCNC: 12 MMOL/L (ref 7–15)
APPEARANCE UR: CLEAR
BILIRUB UR QL STRIP: NEGATIVE
BUN SERPL-MCNC: 16.4 MG/DL (ref 8–23)
CALCIUM SERPL-MCNC: 10.5 MG/DL (ref 8.8–10.4)
CHLORIDE SERPL-SCNC: 103 MMOL/L (ref 98–107)
CHOLEST SERPL-MCNC: 159 MG/DL
COLOR UR AUTO: YELLOW
CREAT SERPL-MCNC: 0.85 MG/DL (ref 0.51–0.95)
EGFRCR SERPLBLD CKD-EPI 2021: 73 ML/MIN/1.73M2
FASTING STATUS PATIENT QL REPORTED: YES
FASTING STATUS PATIENT QL REPORTED: YES
GLUCOSE SERPL-MCNC: 120 MG/DL (ref 70–99)
GLUCOSE UR STRIP-MCNC: NEGATIVE MG/DL
HCO3 SERPL-SCNC: 27 MMOL/L (ref 22–29)
HDLC SERPL-MCNC: 43 MG/DL
HGB UR QL STRIP: NEGATIVE
KETONES UR STRIP-MCNC: NEGATIVE MG/DL
LDLC SERPL CALC-MCNC: 79 MG/DL
LEUKOCYTE ESTERASE UR QL STRIP: NEGATIVE
NITRATE UR QL: NEGATIVE
NONHDLC SERPL-MCNC: 116 MG/DL
PH UR STRIP: 5.5 [PH] (ref 5–8)
POTASSIUM SERPL-SCNC: 4.8 MMOL/L (ref 3.4–5.3)
SODIUM SERPL-SCNC: 142 MMOL/L (ref 135–145)
SP GR UR STRIP: 1.01 (ref 1–1.03)
TRIGL SERPL-MCNC: 186 MG/DL
UROBILINOGEN UR STRIP-ACNC: 0.2 E.U./DL

## 2025-05-20 PROCEDURE — 80061 LIPID PANEL: CPT | Performed by: FAMILY MEDICINE

## 2025-05-20 PROCEDURE — G2211 COMPLEX E/M VISIT ADD ON: HCPCS | Performed by: FAMILY MEDICINE

## 2025-05-20 PROCEDURE — 80048 BASIC METABOLIC PNL TOTAL CA: CPT | Performed by: FAMILY MEDICINE

## 2025-05-20 PROCEDURE — 36415 COLL VENOUS BLD VENIPUNCTURE: CPT | Performed by: FAMILY MEDICINE

## 2025-05-20 PROCEDURE — 99215 OFFICE O/P EST HI 40 MIN: CPT | Performed by: FAMILY MEDICINE

## 2025-05-20 PROCEDURE — 3078F DIAST BP <80 MM HG: CPT | Performed by: FAMILY MEDICINE

## 2025-05-20 PROCEDURE — 81003 URINALYSIS AUTO W/O SCOPE: CPT | Performed by: FAMILY MEDICINE

## 2025-05-20 PROCEDURE — 3075F SYST BP GE 130 - 139MM HG: CPT | Performed by: FAMILY MEDICINE

## 2025-05-20 NOTE — PATIENT INSTRUCTIONS
Thank you for trusting us with your care.    We are referring you to the following service:   Gastroenterology -- stomach and colon concerns      Please call the specialty clinic or imaging center to arrange your referral appointment.  Let us know us you have any questions.           Tennis elbow brace    If the brace does not help you can see Dr. Wes Cisneros at our clinic who can inject that spot.

## 2025-06-10 ENCOUNTER — PATIENT OUTREACH (OUTPATIENT)
Dept: CARE COORDINATION | Facility: CLINIC | Age: 70
End: 2025-06-10
Payer: COMMERCIAL

## 2025-06-10 ENCOUNTER — TRANSFERRED RECORDS (OUTPATIENT)
Dept: ADMINISTRATIVE | Facility: CLINIC | Age: 70
End: 2025-06-10
Payer: COMMERCIAL

## 2025-06-11 PROBLEM — D12.6 ADENOMATOUS POLYP OF COLON: Status: ACTIVE | Noted: 2025-06-11

## 2025-06-11 NOTE — PROCEDURES
[facility]   [address] [place] [address], [address], [place] [address]     Patient Name: Razia Dos Santos  Gender:  Female  Exam Date: 06/10/2025 Visit Number:  [id number]  Age: 69 Years YOB: 1955  Attending MD: Ruslan Goldberg MD Medical Record#:  [id number]    Procedure: Colonoscopy   Indications: Colorectal cancer screening      Referring MD: Referral Self  Primary MD:      Walter He MD  Medications: Admitting Medications:   0.9% Normal Saline at TKO   Intra Procedure Medications:   Patient received monitored anesthesia care.     Complications: No immediate complications  ______________________________________________________________________________  Procedure:   An examination of the heart and lungs was performed and found to be within acceptable limits.  .  The patient was therefore deemed a reasonable candidate for endoscopy and sedation.   The risks and benefits of the procedure were explained to the patient.After obtaining informed consent, the patient received monitored anesthesia care and I passed the scope   without difficulty via the rectum to the cecum.  The appendiceal orifice and ic valve were identified.  The scope was retroflexed during the examination  The quality of the prep was excellent  (Miralax/Gatorade/2 tablets Bisacodyl/Magnesium Citrate).    This was a complete examination throughout the entire colon.    Findings:    Polyp location: transverse colon.  Quantity: 2.  Size:  1-2 mm.  Polyp shape:  sessile.         Maneuver: polypectomy was performed with a cold snare.       Removal:  complete.  Retrieval: complete.  Bleeding: none.    Polyp location: sigmoid.  Quantity: 1.  Size: 5 mm.  Polyp shape: flat lesion.         Maneuver: polypectomy was performed with a  cold snare.       Removal: complete.  Retrieval: complete.  Bleeding: none.    Hemorrhoids.  Internal hemorrhoids without bleeding.  Remainder of the exam is normal.    Impression:  Colorectal  polyps  Hemorrhoids, internal    Preliminary Plan:  The patient and their physician will receive a copy of the pathology report as well as pathology-based recommendations for future screening or surveillance.  Pathology Results:  A: COLON, TRANSVERSE, POLYPS:           1. Tubular adenomas (2)           2. Negative for high grade dysplasia           3. Per the colonoscopy report:               a. Polyp sizes: 1 mm - 2 mm               b. Resection: Complete               c. Retrieval: Complete      B: COLON, SIGMOID, POLYP:           1. Hyperplastic polyp      MICROSCOPIC  A: Performed   B: Performed     Electronically signed by: Sujey Brown MD    Interpreted at [facility], [address] [place] [id number]    Orders    Instruction(s)/Education:  Instruction/Education Timeframe Assessment   Colon Polyps  K63.5   Diverticulosis/Diverticulitis  K63.5   Hemorrhoids  K63.5       Final Plan:  Repeat colonoscopy in 7 years.    We will attempt to contact you at appropriate intervals via [place] mail.  We may not be able to find you or contact you at that time, therefore you should know that the responsibility for following our recommendation rests with you.  If you don't hear from us at the time your procedure is due, please contact our office to schedule an appointment.  If your contact information should change, please contact our office so that we can update your record.      _Electronically signed by:___________________  Ruslan Goldberg MD                 06/10/2025    cc: Walter He MD

## 2025-06-12 ENCOUNTER — PATIENT OUTREACH (OUTPATIENT)
Dept: GASTROENTEROLOGY | Facility: CLINIC | Age: 70
End: 2025-06-12
Payer: COMMERCIAL